# Patient Record
Sex: FEMALE | Race: WHITE | NOT HISPANIC OR LATINO | Employment: OTHER | URBAN - NONMETROPOLITAN AREA
[De-identification: names, ages, dates, MRNs, and addresses within clinical notes are randomized per-mention and may not be internally consistent; named-entity substitution may affect disease eponyms.]

---

## 2017-02-03 ENCOUNTER — TRANSCRIBE ORDERS (OUTPATIENT)
Dept: LAB | Facility: HOSPITAL | Age: 82
End: 2017-02-03

## 2017-02-03 ENCOUNTER — HOSPITAL ENCOUNTER (OUTPATIENT)
Dept: GENERAL RADIOLOGY | Facility: HOSPITAL | Age: 82
Discharge: HOME | End: 2017-02-03
Admitting: NURSE PRACTITIONER

## 2017-02-03 DIAGNOSIS — R91.8 MULTIPLE LUNG NODULES: ICD-10-CM

## 2017-02-03 DIAGNOSIS — R91.8 MULTIPLE LUNG NODULES: Primary | ICD-10-CM

## 2017-02-03 PROCEDURE — 71020 HC CHEST PA AND LATERAL: CPT

## 2017-02-07 ENCOUNTER — HOSPITAL ENCOUNTER (OUTPATIENT)
Dept: GENERAL RADIOLOGY | Facility: HOSPITAL | Age: 82
Discharge: HOME | End: 2017-02-07
Attending: NEUROLOGICAL SURGERY

## 2017-02-07 ENCOUNTER — TRANSCRIBE ORDERS (OUTPATIENT)
Dept: LAB | Facility: HOSPITAL | Age: 82
End: 2017-02-07

## 2017-02-07 ENCOUNTER — HOSPITAL ENCOUNTER (OUTPATIENT)
Dept: GENERAL RADIOLOGY | Facility: HOSPITAL | Age: 82
Discharge: HOME | End: 2017-02-07
Attending: NEUROLOGICAL SURGERY | Admitting: NEUROLOGICAL SURGERY
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
## 2017-02-07 PROCEDURE — 72100 X-RAY EXAM L-S SPINE 2/3 VWS: CPT

## 2017-02-07 PROCEDURE — 72070 X-RAY EXAM THORAC SPINE 2VWS: CPT

## 2017-02-14 ENCOUNTER — APPOINTMENT (OUTPATIENT)
Dept: GENERAL RADIOLOGY | Facility: HOSPITAL | Age: 82
End: 2017-02-14

## 2017-02-14 ENCOUNTER — APPOINTMENT (OUTPATIENT)
Dept: MRI IMAGING | Facility: HOSPITAL | Age: 82
End: 2017-02-14

## 2017-02-14 ENCOUNTER — HOSPITAL ENCOUNTER (INPATIENT)
Facility: HOSPITAL | Age: 82
LOS: 4 days | Discharge: NURSING, REHAB OR LONG-TERM CARE | End: 2017-02-18
Attending: NEUROLOGICAL SURGERY | Admitting: NEUROLOGICAL SURGERY

## 2017-02-14 ENCOUNTER — APPOINTMENT (OUTPATIENT)
Dept: CT IMAGING | Facility: HOSPITAL | Age: 82
End: 2017-02-14

## 2017-02-14 DIAGNOSIS — R53.1 GENERALIZED WEAKNESS: Primary | ICD-10-CM

## 2017-02-14 DIAGNOSIS — R13.10 DYSPHAGIA, UNSPECIFIED TYPE: ICD-10-CM

## 2017-02-14 DIAGNOSIS — Z74.09 IMPAIRED PHYSICAL MOBILITY: ICD-10-CM

## 2017-02-14 DIAGNOSIS — S22.000A THORACIC COMPRESSION FRACTURE, CLOSED, INITIAL ENCOUNTER (HCC): ICD-10-CM

## 2017-02-14 DIAGNOSIS — Z78.9 IMPAIRED MOBILITY AND ADLS: ICD-10-CM

## 2017-02-14 DIAGNOSIS — Z74.09 IMPAIRED MOBILITY AND ADLS: ICD-10-CM

## 2017-02-14 LAB
ALBUMIN SERPL-MCNC: 4.2 G/DL (ref 3.5–5)
ALBUMIN/GLOB SERPL: 1.4 G/DL (ref 1.1–2.5)
ALP SERPL-CCNC: 116 U/L (ref 24–120)
ALT SERPL W P-5'-P-CCNC: 39 U/L (ref 0–54)
ANION GAP SERPL CALCULATED.3IONS-SCNC: 10 MMOL/L (ref 4–13)
APTT PPP: 29.3 SECONDS (ref 24.1–34.8)
AST SERPL-CCNC: 28 U/L (ref 7–45)
BACTERIA UR QL AUTO: ABNORMAL /HPF
BASOPHILS # BLD AUTO: 0.02 10*3/MM3 (ref 0–0.2)
BASOPHILS NFR BLD AUTO: 0.1 % (ref 0–2)
BILIRUB SERPL-MCNC: 1.3 MG/DL (ref 0.1–1)
BILIRUB UR QL STRIP: NEGATIVE
BUN BLD-MCNC: 22 MG/DL (ref 5–21)
BUN/CREAT SERPL: 28.6 (ref 7–25)
CALCIUM SPEC-SCNC: 9.6 MG/DL (ref 8.4–10.4)
CHLORIDE SERPL-SCNC: 96 MMOL/L (ref 98–110)
CLARITY UR: ABNORMAL
CO2 SERPL-SCNC: 23 MMOL/L (ref 24–31)
COLOR UR: ABNORMAL
CREAT BLD-MCNC: 0.77 MG/DL (ref 0.5–1.4)
DEPRECATED RDW RBC AUTO: 49.8 FL (ref 40–54)
EOSINOPHIL # BLD AUTO: 0 10*3/MM3 (ref 0–0.7)
EOSINOPHIL NFR BLD AUTO: 0 % (ref 0–4)
ERYTHROCYTE [DISTWIDTH] IN BLOOD BY AUTOMATED COUNT: 14.4 % (ref 12–15)
GFR SERPL CREATININE-BSD FRML MDRD: 71 ML/MIN/1.73
GLOBULIN UR ELPH-MCNC: 3 GM/DL
GLUCOSE BLD-MCNC: 163 MG/DL (ref 70–100)
GLUCOSE UR STRIP-MCNC: NEGATIVE MG/DL
HCT VFR BLD AUTO: 41.1 % (ref 37–47)
HGB BLD-MCNC: 14 G/DL (ref 12–16)
HGB UR QL STRIP.AUTO: NEGATIVE
HYALINE CASTS UR QL AUTO: ABNORMAL /LPF
IMM GRANULOCYTES # BLD: 0.18 10*3/MM3 (ref 0–0.03)
IMM GRANULOCYTES NFR BLD: 1.2 % (ref 0–5)
INR PPP: 0.97 (ref 0.91–1.09)
KETONES UR QL STRIP: ABNORMAL
LEUKOCYTE ESTERASE UR QL STRIP.AUTO: ABNORMAL
LYMPHOCYTES # BLD AUTO: 0.67 10*3/MM3 (ref 0.72–4.86)
LYMPHOCYTES NFR BLD AUTO: 4.4 % (ref 15–45)
MCH RBC QN AUTO: 32.3 PG (ref 28–32)
MCHC RBC AUTO-ENTMCNC: 34.1 G/DL (ref 33–36)
MCV RBC AUTO: 94.9 FL (ref 82–98)
MONOCYTES # BLD AUTO: 0.62 10*3/MM3 (ref 0.19–1.3)
MONOCYTES NFR BLD AUTO: 4 % (ref 4–12)
NEUTROPHILS # BLD AUTO: 13.84 10*3/MM3 (ref 1.87–8.4)
NEUTROPHILS NFR BLD AUTO: 90.3 % (ref 39–78)
NITRITE UR QL STRIP: NEGATIVE
NT-PROBNP SERPL-MCNC: 320 PG/ML (ref 0–1800)
PH UR STRIP.AUTO: <=5 [PH] (ref 5–8)
PLATELET # BLD AUTO: 267 10*3/MM3 (ref 130–400)
PMV BLD AUTO: 11.3 FL (ref 6–12)
POTASSIUM BLD-SCNC: 4.7 MMOL/L (ref 3.5–5.3)
PROT SERPL-MCNC: 7.2 G/DL (ref 6.3–8.7)
PROT UR QL STRIP: ABNORMAL
PROTHROMBIN TIME: 13.2 SECONDS (ref 11.9–14.6)
RBC # BLD AUTO: 4.33 10*6/MM3 (ref 4.2–5.4)
RBC # UR: ABNORMAL /HPF
REF LAB TEST METHOD: ABNORMAL
SODIUM BLD-SCNC: 129 MMOL/L (ref 135–145)
SP GR UR STRIP: 1.02 (ref 1–1.03)
SQUAMOUS #/AREA URNS HPF: ABNORMAL /HPF
UROBILINOGEN UR QL STRIP: ABNORMAL
WBC NRBC COR # BLD: 15.33 10*3/MM3 (ref 4.8–10.8)
WBC UR QL AUTO: ABNORMAL /HPF

## 2017-02-14 PROCEDURE — 93010 ELECTROCARDIOGRAM REPORT: CPT | Performed by: INTERNAL MEDICINE

## 2017-02-14 PROCEDURE — 85025 COMPLETE CBC W/AUTO DIFF WBC: CPT | Performed by: NEUROLOGICAL SURGERY

## 2017-02-14 PROCEDURE — 70450 CT HEAD/BRAIN W/O DYE: CPT

## 2017-02-14 PROCEDURE — 85610 PROTHROMBIN TIME: CPT | Performed by: NEUROLOGICAL SURGERY

## 2017-02-14 PROCEDURE — 71020 HC CHEST PA AND LATERAL: CPT

## 2017-02-14 PROCEDURE — 93005 ELECTROCARDIOGRAM TRACING: CPT

## 2017-02-14 PROCEDURE — 85730 THROMBOPLASTIN TIME PARTIAL: CPT | Performed by: NEUROLOGICAL SURGERY

## 2017-02-14 PROCEDURE — 73503 X-RAY EXAM HIP UNI 4/> VIEWS: CPT

## 2017-02-14 PROCEDURE — 25010000002 MORPHINE SULFATE (PF) 2 MG/ML SOLUTION: Performed by: INTERNAL MEDICINE

## 2017-02-14 PROCEDURE — 87086 URINE CULTURE/COLONY COUNT: CPT | Performed by: INTERNAL MEDICINE

## 2017-02-14 PROCEDURE — 72131 CT LUMBAR SPINE W/O DYE: CPT

## 2017-02-14 PROCEDURE — 83880 ASSAY OF NATRIURETIC PEPTIDE: CPT | Performed by: INTERNAL MEDICINE

## 2017-02-14 PROCEDURE — 80053 COMPREHEN METABOLIC PANEL: CPT | Performed by: NEUROLOGICAL SURGERY

## 2017-02-14 PROCEDURE — 99285 EMERGENCY DEPT VISIT HI MDM: CPT

## 2017-02-14 PROCEDURE — 72125 CT NECK SPINE W/O DYE: CPT

## 2017-02-14 PROCEDURE — 87088 URINE BACTERIA CULTURE: CPT | Performed by: INTERNAL MEDICINE

## 2017-02-14 PROCEDURE — G0378 HOSPITAL OBSERVATION PER HR: HCPCS

## 2017-02-14 PROCEDURE — 81001 URINALYSIS AUTO W/SCOPE: CPT | Performed by: INTERNAL MEDICINE

## 2017-02-14 PROCEDURE — 87186 SC STD MICRODIL/AGAR DIL: CPT | Performed by: INTERNAL MEDICINE

## 2017-02-14 PROCEDURE — 73502 X-RAY EXAM HIP UNI 2-3 VIEWS: CPT

## 2017-02-14 PROCEDURE — 72128 CT CHEST SPINE W/O DYE: CPT

## 2017-02-14 PROCEDURE — 99220 PR INITIAL OBSERVATION CARE/DAY 70 MINUTES: CPT | Performed by: NEUROLOGICAL SURGERY

## 2017-02-14 PROCEDURE — 87040 BLOOD CULTURE FOR BACTERIA: CPT | Performed by: NEUROLOGICAL SURGERY

## 2017-02-14 PROCEDURE — 73030 X-RAY EXAM OF SHOULDER: CPT

## 2017-02-14 PROCEDURE — 70551 MRI BRAIN STEM W/O DYE: CPT

## 2017-02-14 RX ORDER — ONDANSETRON 2 MG/ML
4 INJECTION INTRAMUSCULAR; INTRAVENOUS EVERY 6 HOURS PRN
Status: DISCONTINUED | OUTPATIENT
Start: 2017-02-14 | End: 2017-02-18 | Stop reason: HOSPADM

## 2017-02-14 RX ORDER — NYSTATIN 100000 [USP'U]/G
POWDER TOPICAL EVERY 12 HOURS SCHEDULED
Status: DISCONTINUED | OUTPATIENT
Start: 2017-02-14 | End: 2017-02-18 | Stop reason: HOSPADM

## 2017-02-14 RX ORDER — PREDNISONE 10 MG/1
10 TABLET ORAL
Status: DISCONTINUED | OUTPATIENT
Start: 2017-02-15 | End: 2017-02-18 | Stop reason: HOSPADM

## 2017-02-14 RX ORDER — HYDROCODONE BITARTRATE AND ACETAMINOPHEN 5; 325 MG/1; MG/1
1 TABLET ORAL EVERY 6 HOURS PRN
Status: DISCONTINUED | OUTPATIENT
Start: 2017-02-14 | End: 2017-02-18 | Stop reason: HOSPADM

## 2017-02-14 RX ORDER — SODIUM CHLORIDE 9 MG/ML
75 INJECTION, SOLUTION INTRAVENOUS CONTINUOUS
Status: DISCONTINUED | OUTPATIENT
Start: 2017-02-14 | End: 2017-02-16

## 2017-02-14 RX ORDER — SENNA AND DOCUSATE SODIUM 50; 8.6 MG/1; MG/1
2 TABLET, FILM COATED ORAL 2 TIMES DAILY
Status: DISCONTINUED | OUTPATIENT
Start: 2017-02-14 | End: 2017-02-18 | Stop reason: HOSPADM

## 2017-02-14 RX ORDER — ACETAMINOPHEN 650 MG/1
650 SUPPOSITORY RECTAL EVERY 4 HOURS PRN
Status: DISCONTINUED | OUTPATIENT
Start: 2017-02-14 | End: 2017-02-18 | Stop reason: HOSPADM

## 2017-02-14 RX ORDER — SODIUM CHLORIDE 0.9 % (FLUSH) 0.9 %
1-10 SYRINGE (ML) INJECTION AS NEEDED
Status: DISCONTINUED | OUTPATIENT
Start: 2017-02-14 | End: 2017-02-18 | Stop reason: HOSPADM

## 2017-02-14 RX ORDER — LOSARTAN POTASSIUM 50 MG/1
50 TABLET ORAL DAILY
Status: DISCONTINUED | OUTPATIENT
Start: 2017-02-14 | End: 2017-02-18 | Stop reason: HOSPADM

## 2017-02-14 RX ORDER — BISACODYL 10 MG
10 SUPPOSITORY, RECTAL RECTAL DAILY PRN
Status: DISCONTINUED | OUTPATIENT
Start: 2017-02-14 | End: 2017-02-18 | Stop reason: HOSPADM

## 2017-02-14 RX ORDER — METOPROLOL SUCCINATE 25 MG/1
12.5 TABLET, EXTENDED RELEASE ORAL NIGHTLY
Status: DISCONTINUED | OUTPATIENT
Start: 2017-02-14 | End: 2017-02-18 | Stop reason: HOSPADM

## 2017-02-14 RX ORDER — OXYCODONE HYDROCHLORIDE AND ACETAMINOPHEN 5; 325 MG/1; MG/1
1 TABLET ORAL EVERY 6 HOURS PRN
Status: DISCONTINUED | OUTPATIENT
Start: 2017-02-14 | End: 2017-02-18 | Stop reason: HOSPADM

## 2017-02-14 RX ORDER — MORPHINE SULFATE 2 MG/ML
1 INJECTION, SOLUTION INTRAMUSCULAR; INTRAVENOUS EVERY 4 HOURS PRN
Status: DISCONTINUED | OUTPATIENT
Start: 2017-02-14 | End: 2017-02-15

## 2017-02-14 RX ADMIN — LOSARTAN POTASSIUM 50 MG: 50 TABLET, FILM COATED ORAL at 17:53

## 2017-02-14 RX ADMIN — MORPHINE SULFATE 1 MG: 2 INJECTION, SOLUTION INTRAMUSCULAR; INTRAVENOUS at 22:35

## 2017-02-14 RX ADMIN — SODIUM CHLORIDE 75 ML/HR: 9 INJECTION, SOLUTION INTRAVENOUS at 17:52

## 2017-02-14 RX ADMIN — MORPHINE SULFATE 1 MG: 2 INJECTION, SOLUTION INTRAMUSCULAR; INTRAVENOUS at 18:46

## 2017-02-14 RX ADMIN — NYSTATIN: 100000 POWDER TOPICAL at 22:40

## 2017-02-14 RX ADMIN — METOPROLOL SUCCINATE 12.5 MG: 25 TABLET, FILM COATED, EXTENDED RELEASE ORAL at 22:37

## 2017-02-14 NOTE — CONSULTS
[facility] Medicine Consult  Consults    Date of Admission: 2/14/2017  Date of Consult: 02/14/17    Primary Care Physician: Pascual Patten MD  Referring Physician:     Chief Complaint/Reason for Consultation: Altered mental status, medical management.    History of Present Illness   Mrs. Neal's 84-year-old  female patient who currently resides at [facility] in [place], [place].  She sees Dr. Pascual Patten for primary care.  I know her well.  Taking care of her in the past.  Also family well.  Her niece, Sarah Yin, is her power of  and also Dr. Yin wife.  She is typically with her on that has gone home tonight.    She was brought in today after falling 5 times over the last week.  Trauma workup in the emergency department ultimately revealed a left intertrochanteric hip fracture.  MRI of the brain without contrast failed to show any acute abnormality.    Currently she complains of pain in her left lower extremity.  She also suffered a couple skin tears.    She denies any recent shortness of breath, chest pain, or extremity edema.  Denies fever sweats or chills.  No cough, productive or otherwise.  No recent increased weight gain or unintentional weight loss.  No problems with dysuria or frequency.  She does have a history of UTI, recurrent.  No changes in bowel habits.  No diarrhea.  She does have a history of previous Clostridium difficile colitis, which was difficult to treat as it was superimposed on her more chronic ulcerative colitis, for which she does not take maintenance therapy.    Review of Systems   Otherwise complete ROS is negative except as mentioned above.    Past Medical History:  has a past medical history of Anxiety; Arthritis; Clostridium difficile infection; Concussion; COPD (chronic obstructive pulmonary disease); Fall; History of transfusion; Hypertension; Inappropriate vasopressin secretion syndrome; Irregular  heart rhythm; TIA (transient ischemic attack); and Ulcerative colitis.    Past Surgical History:  has a past surgical history that includes Cholecystectomy; Replacement total knee bilateral; and Eye surgery.    Family History: Hypertension.    Social History:  reports that she has never smoked. She does not have any smokeless tobacco history on file. She reports that she does not drink alcohol or use illicit drugs.    Allergies:   Allergies   Allergen Reactions   • Albuterol    • Augmentin  [Amoxicillin-Pot Clavulanate]    • Cefdinir    • Cefprozil    • Doxycycline    • Erythromycin    • Moxifloxacin    • Sulfa Antibiotics      Medications: Scheduled Meds:  losartan 50 mg Oral Daily   metoprolol succinate XL 12.5 mg Oral Nightly   nystatin  Topical Q12H   [START ON 2/15/2017] predniSONE 10 mg Oral Daily With Breakfast   sennosides-docusate sodium 2 tablet Oral BID     Continuous Infusions:  sodium chloride 75 mL/hr Last Rate: 75 mL/hr (02/14/17 1752)     PRN Meds:.•  acetaminophen  •  bisacodyl  •  ondansetron  •  sodium chloride    Objective    Physical Exam:   Temp:  [97.3 °F (36.3 °C)-97.8 °F (36.6 °C)] 97.3 °F (36.3 °C)  Heart Rate:  [104-121] 104  Resp:  [16-20] 20  BP: (137-163)/(71-95) 159/95  Physical Exam   Constitutional: She is oriented to person, place, and time. She appears well-developed and well-nourished.   Discussed with her nurse, Greta.   HENT:   Head: Normocephalic and atraumatic.   Eyes: Conjunctivae and EOM are normal. Pupils are equal, round, and reactive to light.   Neck: Neck supple. No JVD present. No thyromegaly present.   Cardiovascular: Normal rate, regular rhythm, normal heart sounds and intact distal pulses.  Exam reveals no gallop and no friction rub.    No murmur heard.  Pulmonary/Chest: Effort normal and breath sounds normal. No respiratory distress. She has no wheezes. She has no rales. She exhibits no tenderness.   Abdominal: Soft. Bowel sounds are normal. She exhibits no  distension. There is no tenderness. There is no rebound and no guarding.   Musculoskeletal: Normal range of motion. She exhibits no edema, tenderness or deformity.   Lymphadenopathy:     She has no cervical adenopathy.   Neurological: She is alert and oriented to person, place, and time. She displays normal reflexes. No cranial nerve deficit. She exhibits normal muscle tone.   Generalized weakness without focality.   Skin: Skin is warm and dry. No rash noted.   Psychiatric: She has a normal mood and affect. Her behavior is normal. Judgment and thought content normal.     Results Reviewed:  I have personally reviewed current lab, radiology, and data and agree with results.  Lab Results (last 24 hours)     Procedure Component Value Units Date/Time    Blood Culture [[id number]] Collected:  02/14/17 1248    Specimen:  Blood from Arm, Right Updated:  02/14/17 1506    CBC & Differential [[id number]] Collected:  02/14/17 1248    Specimen:  Blood Updated:  02/14/17 1506    Narrative:       The following orders were created for panel order CBC & Differential.  Procedure                               Abnormality         Status                     ---------                               -----------         ------                     CBC Auto Differential[[id number]]         Abnormal            Final result                 Please view results for these tests on the individual orders.    CBC Auto Differential [[id number]]  (Abnormal) Collected:  02/14/17 1248    Specimen:  Blood from Arm, Right Updated:  02/14/17 1506     WBC 15.33 (H) 10*3/mm3      RBC 4.33 10*6/mm3      Hemoglobin 14.0 g/dL      Hematocrit 41.1 %      MCV 94.9 fL      MCH 32.3 (H) pg      MCHC 34.1 g/dL      RDW 14.4 %      RDW-SD 49.8 fl      MPV 11.3 fL      Platelets 267 10*3/mm3      Neutrophil % 90.3 (H) %      Lymphocyte % 4.4 (L) %      Monocyte % 4.0 %      Eosinophil % 0.0 %      Basophil % 0.1 %      Immature Grans % 1.2 %      Neutrophils, Absolute 13.84  (H) 10*3/mm3      Lymphocytes, Absolute 0.67 (L) 10*3/mm3      Monocytes, Absolute 0.62 10*3/mm3      Eosinophils, Absolute 0.00 10*3/mm3      Basophils, Absolute 0.02 10*3/mm3      Immature Grans, Absolute 0.18 (H) 10*3/mm3     aPTT [[id number]]  (Normal) Collected:  02/14/17 1248    Specimen:  Blood from Arm, Right Updated:  02/14/17 1518     PTT 29.3 seconds     Protime-INR [[id number]]  (Normal) Collected:  02/14/17 1248    Specimen:  Blood from Arm, Right Updated:  02/14/17 1518     Protime 13.2 Seconds      INR 0.97     Comprehensive Metabolic Panel [[id number]]  (Abnormal) Collected:  02/14/17 1248    Specimen:  Blood from Arm, Right Updated:  02/14/17 1523     Glucose 163 (H) mg/dL      BUN 22 (H) mg/dL      Creatinine 0.77 mg/dL      Sodium 129 (L) mmol/L      Potassium 4.7 mmol/L      Chloride 96 (L) mmol/L      CO2 23.0 (L) mmol/L      Calcium 9.6 mg/dL      Total Protein 7.2 g/dL      Albumin 4.20 g/dL      ALT (SGPT) 39 U/L      AST (SGOT) 28 U/L      Alkaline Phosphatase 116 U/L      Total Bilirubin 1.3 (H) mg/dL      eGFR Non African Amer 71 mL/min/1.73      Globulin 3.0 gm/dL      A/G Ratio 1.4 g/dL      BUN/Creatinine Ratio 28.6 (H)      Anion Gap 10.0 mmol/L     Narrative:       The MDRD GFR formula is only valid for adults with stable renal function between ages 18 and 70.        Imaging Results (last 24 hours)     Procedure Component Value Units Date/Time    CT Cervical Spine Without Contrast [[id number]] Collected:  02/14/17 1523     Updated:  02/14/17 1527    Narrative:       EXAMINATION: CT CERVICAL SPINE WO CONTRAST-      2/14/2017 3:00 PM CST     HISTORY: trauma; R53.1-Weakness  Fall related injury. Neck pain.     In order to have a CT radiation dose as low as reasonably achievable  Automated Exposure Control was utilized for adjustment of the mA and/or  KV according to patient size.     DLP in mGycm= 211.     Routine protocol noncontrast CT imaging of the cervical spine compared  with 06/22/2015.      Diffuse osteopenia.  Curvature and alignment is appropriate.     There is multilevel disc space narrowing and endplate spurring most  prominent at C3-4, C5-6, and C6-7.     Facet joints spurring is noted. Facet joints align appropriately.  Prevertebral soft tissues are appropriate.     No significant scoliosis.     Bilateral carotid artery calcification is noted.     Summary:  1. Extensive degenerative disc and endplate change with no evidence of  fracture.       This report was finalized on 02/14/2017 15:24 by Dr. Jonathno Gómez MD.    CT Lumbar Spine Without Contrast [[id number]] Collected:  02/14/17 1530     Updated:  02/14/17 1543    Narrative:       EXAMINATION: CT LUMBAR SPINE WO CONTRAST-      2/14/2017 3:00 PM CST     HISTORY: trauma; R53.1-Weakness  Fall. Back pain.     In order to have a CT radiation dose as low as reasonably achievable  Automated Exposure Control was utilized for adjustment of the mA and/or  KV according to patient size.     DLP in mGycm= 844.     Routine protocol noncontrast axial, sagittal, and coronal lumbar spine  CT imaging.     Diffuse osteopenia.     13 degrees left convex lumbar scoliosis.  Associated asymmetric disc space narrowing and endplate spurring.     Sagittal imaging shows 4-5 mm anterolisthesis at L4-5.  Transitional lumbosacral segment with sacralization of L5.     Multilevel disc space narrowing, endplate spurring, and degenerative  vacuum disc change.     No compression fracture.     Sclerosis and cortical irregularity compatible with a right sacral ala  insufficiency fracture     Summary:  1. Extensive degenerative disc and endplate change with no acute lumbar  spine fracture.  2. Subacute or chronic right sacral ala insufficiency fracture present.     This report was finalized on 02/14/2017 15:41 by Dr. Jonathon Gómez MD.    CT Head Without Contrast [[id number]] Collected:  02/14/17 1538     Updated:  02/14/17 1626    Narrative:       HISTORY: Confusion, fell, DLP  593.     CT SCAN OF THE HEAD WITHOUT CONTRAST:  Comparison study dated 12/03/2016.     Diffuse atrophy and chronic small vessel ischemic changes felt present.  Some left frontal cortical inner table thickening is stable and suggests  a possible minimal chronic meningioma. There is no midline shift or  extra axial fluid collection noted.     Vascular calcifications noted diffusely. There is no evidence of edema  or hemorrhage. Basal ganglia calcifications are slightly asymmetrical.     Some lucencies in the left parietal bone appears stable and do not  appear to represent acute fractures.     Reformatted images are reviewed. High left parietal scalp swelling near  the vertex.     IMPRESSIONS:  1. Atrophy and chronic changes. No evidence of acute hemorrhage or  hematoma.  2. Some high left parietal scalp hematoma near the vertex. No associated  acute fracture.  3. Vascular calcifications present.  This report was finalized on 02/14/2017 16:24 by Dr. Nickolas Villanueva MD.    XR Chest PA & Lateral [[id number]] Collected:  02/14/17 1715     Updated:  02/14/17 1718    Narrative:       EXAMINATION: XR CHEST PA AND LATERAL- 2/14/2017 5:15 PM CST     HISTORY: Shortness of breath.     REPORT: Comparison is made with the study from 02/03/2017.     Coarse interstitial infiltrates have increased, most likely interstitial  edema, there is mild cardiomegaly. There is moderate thoracic aortic  ectasia. No lung consolidation is identified. No pneumothorax or pleural  effusion seen. The osseous structures are within normal limits.       Impression:       Interstitial opacities likely related to edema and volume  overload. Interstitial pneumonitis is also in the differential. There is  no consolidating pneumonia. Underlying COPD is evident.  This report was finalized on 02/14/2017 17:16 by Dr. Farrukh Cantu MD.    CT Thoracic Spine Without Contrast [[id number]] Collected:  02/14/17 1537     Updated:  02/14/17 1718    Narrative:       EXAM:    CT THORACIC SPINE WO CONTRAST-       DATE:  02/14/2017      HISTORY:  Female, age  84 years;trauma; R53.1-Weakness     COMPARISON:  None.     FINDINGS:  There is a subtle grade 1 anterolisthesis of C7 on T1 and C4 on C5.  There is no evidence of acute compression deformity. The demineralized  skeleton does limit bony details for nondisplaced acute fracture. The  prevertebral and posterior paraspinal soft tissues are unremarkable.  There are multilevel degenerative changes in the thoracic spine. No  suspicious lytic or blastic lesion.       Impression:       1.  No acute osseous pathology.  2.  Chronic findings as described above.     This report was finalized on 02/14/2017 17:16 by Dr. Perlita Fountain MD.    XR Shoulder 2+ View Left [[id number]] Collected:  02/14/17 1718     Updated:  02/14/17 1721    Narrative:       EXAMINATION: XR SHOULDER 2+ VW LEFT- 2/14/2017 5:18 PM CST     HISTORY: Trauma.     REPORT: 2 views of the left shoulder were obtained. There are no  comparison studies.     Alignment is normal, no fractures identified. The joint spaces are  preserved. There is mild spurring at the undersurface of the  acromioclavicular joint and inferior glenoid process.       Impression:       No acute osseous abnormality. Mild degenerative changes are  present.  This report was finalized on 02/14/2017 17:19 by Dr. Farrukh Cantu MD.    XR Hip With or Without Pelvis 2 - 3 View Left [[id number]] Collected:  02/14/17 1719     Updated:  02/14/17 1723    Narrative:       EXAMINATION: XR HIP W OR WO PELVIS 2-3 VIEW LEFT- 2/14/2017 5:19 PM CST     HISTORY: Left hip pain.     REPORT: An AP view the pelvis and to separate views of the left hip were  obtained. There are no comparison studies.     There is a complete closed mildly displaced acute intertrochanteric left  fracture, this is extra-articular. The hip joint spaces are preserved.  The right hip is normal. The other osseous structures are unremarkable  except for  moderate degenerative changes of the lower lumbar spine.       Impression:       Acute complete closed and mildly displaced left  intertrochanteric hip fracture.  This report was finalized on 02/14/2017 17:21 by Dr. Farrukh Cantu MD.    MRI Brain Without Contrast [[id number]] Collected:  02/14/17 1749     Updated:  02/14/17 1754    Narrative:       EXAMINATION: MRI BRAIN WO CONTRAST- 2/14/2017 5:49 PM CST     HISTORY:   Since speech disturbance.     REPORT: Multiplanar multisequence MR imaging of the brain was performed  without contrast. Comparison is made with previous MRI from 11/04/2014  and head CT from 02/14/2017.     The diffusion-weighted images show no diffusion restriction, there is  motion artifact which degrades the study. The sagittal T1-weighted  images show a grossly normal appearance of the midline structures  including the pituitary gland, brainstem and corpus callosum. There is  mild to moderate diffuse cortical atrophy. Gradient echo images show no  evidence of intracranial hemorrhage, given the limitations of motion  artifact. The ventricles and basal cisterns are within normal limits.  There is a large amount of confluent FLAIR and T2 hyperintense signal  within the subcortical and periventricular right matter tracts  compatible with advanced chronic small vessel white matter ischemic  disease. This appears unchanged. MR signal is seen within the deep white  matter tracts within the joseluis and brainstem. There is no mass effect.       Impression:       Limited study due to patient motion shows no acute  intracranial abnormality. There are severe chronic small vessel white  matter ischemic changes as well as moderate diffuse atrophy.  This report was finalized on 02/14/2017 17:51 by Dr. Farrukh Cantu MD.        Assessment & Plan  1.  Recurrent falls.  2.  Altered mental status.  3.  Acute, mildly displaced left intertrochanteric hip fracture.  4.  Subacute or chronic right sacral ala  insufficiency fracture.  5.  Hypertension.   6.  Hyponatremia, chronic, SIADH.   7.  History of ulcerative colitis.  8.  History of Clostridium difficile colitis.  9.  Hyperlipidemia.  10.  Interstitial opacities, pulmonary edema versus interstitial pneumonitis, asymptomatic..     Continue home medications.  She will be nothing by mouth after midnight.  Orthopedics to see.      She is intermediate risk for intermediate risk procedure with regards to hip fracture.    Pain control.    She needs a urinalysis with culture and sensitivity.  She has a history of recurrent UTI.  This could be causing some of her altered mental status as this has been a problem for her in the past.    Add a BNP to her labs.  She has an abnormal chest x-ray, but is asymptomatic at this point in time.  No recent echocardiogram in the system.  No history of congestive heart failure.  She does have a slight leukocytosis but no other signs or symptoms of pneumonia.    Fluid restriction for her history of chronic hyponatremia and SIADH.    She will need postoperative DVT prophylaxis.    She will need postoperative physical and occupational therapy consultation.    Case management consultation for possible placement after her operative repair.    I discussed the patients findings and my recommendations with the patient and her niece.     Yusef Hay DO  02/14/17  5:56 PM

## 2017-02-14 NOTE — H&P
Patient Care Team:  Pascual Patten MD as PCP - General (Internal Medicine)  Alli Hayes MD as Consulting Physician (Pulmonary Disease)    Chief complaint: fall    Subjective     HPI: HPI    This patient is a 84-year-old female who has fallen 5 times over the past week.  In addition she is also developing speech abnormality.  She was brought to the ER by her niece, neurosurgery has been constant regarding her altered mental status.  She is currently resting comfortably, she is unable to provide any history.    Review of Systems     A 12 point review of systems is unable to be obtained secondary to patient's speech difficulties    Past Medical History   Diagnosis Date   • Anxiety    • Arthritis    • Clostridium difficile infection    • Concussion    • Fall    • Hypertension    • Inappropriate vasopressin secretion syndrome    • Irregular heart rhythm    • TIA (transient ischemic attack)    • Ulcerative colitis      Past Surgical History   Procedure Laterality Date   • Cholecystectomy     • Replacement total knee bilateral       History reviewed. No pertinent family history.  Social History   Substance Use Topics   • Smoking status: Never Smoker   • Smokeless tobacco: None   • Alcohol use No       (Not in a hospital admission)  Allergies:  Albuterol; Augmentin  [amoxicillin-pot clavulanate]; Cefdinir; Cefprozil; Doxycycline; Erythromycin; Moxifloxacin; and Sulfa antibiotics      Objective    Neurologic Exam  Vital Signs  Temp:  [97.8 °F (36.6 °C)] 97.8 °F (36.6 °C)  Heart Rate:  [117-121] 118  Resp:  [16] 16  BP: (137-163)/(71-86) 137/71    Physical Exam    No acute distress  Awake alert  HEENT right forehead ecchymosis  Neck supple  Abdomen soft nontender  Extremities left ankle abrasion, no clubbing edema cyanosis  Neurologic exam  Cranial nerves II through XII intact  Patient follows commands briskly  Patient has elements of expressive aphasia  2+ reflexes  No long tract signs  Patient moves all  extremities well  She has significant pain and left hip with extension of leg  Exam is limited by patient's mental status    Results Review:   I reviewed the patient's new clinical results.  I reviewed the patient's new imaging results and agree with the interpretation.  I reviewed the patient's other test results and agree with the interpretation      Lab Results (last 24 hours)     ** No results found for the last 24 hours. **            Patient Active Problem List   Diagnosis   • Generalized weakness   • SIADH (syndrome of inappropriate ADH production)   • HTN (hypertension)   • Osteoarthritis   • Ulcerative colitis   • Pulmonary nodules   • Iron deficiency anemia   • Bacterial UTI   • Altered mental status       Assessment/Plan     84-year-old female status post multiple falls with altered mental status.  We will obtain a CT scan of the head and plain films of the left hip and follow-up after completion of study.  Discussed with ruben, patient is DNR/DNI.    I discussed the patients findings and my recommendations with patient    Patricio Neal MD  02/14/17  3:00 PM

## 2017-02-14 NOTE — PLAN OF CARE
Problem: Patient Care Overview (Adult)  Goal: Plan of Care Review  Outcome: Ongoing (interventions implemented as appropriate)    02/14/17 8886   Coping/Psychosocial Response Interventions   Plan Of Care Reviewed With patient   Patient Care Overview   Progress no change   Outcome Evaluation   Outcome Summary/Follow up Plan Patient admitted to room 473, disoriented to time with reorientation, Neuro Q4H, will continue to monitor.          Problem: Confusion, Acute (Adult)  Goal: Identify Related Risk Factors and Signs and Symptoms  Outcome: Outcome(s) achieved Date Met:  02/14/17  Goal: Cognitive/Functional Impairments Minimized  Outcome: Ongoing (interventions implemented as appropriate)  Goal: Safety  Outcome: Ongoing (interventions implemented as appropriate)

## 2017-02-14 NOTE — ED NOTES
Patient's niece at bedside. Patient's niece states patient has fallen five times in the past week. Patient's family states patient has been walking with a walker, but is very unstable. Today patient was found in floor at assisted living facility in her room. Fall was unwitnessed. Patient is verbal but with inappropriate speech. Patient is unable to recall name of family member. Patient's niece states patient was having normal conversation with her this morning.      Samantha Navarro RN  02/14/17 1659

## 2017-02-15 ENCOUNTER — APPOINTMENT (OUTPATIENT)
Dept: GENERAL RADIOLOGY | Facility: HOSPITAL | Age: 82
End: 2017-02-15

## 2017-02-15 ENCOUNTER — ANESTHESIA EVENT (OUTPATIENT)
Dept: PERIOP | Facility: HOSPITAL | Age: 82
End: 2017-02-15

## 2017-02-15 ENCOUNTER — ANESTHESIA (OUTPATIENT)
Dept: PERIOP | Facility: HOSPITAL | Age: 82
End: 2017-02-15

## 2017-02-15 LAB
ANION GAP SERPL CALCULATED.3IONS-SCNC: 7 MMOL/L (ref 4–13)
BUN BLD-MCNC: 21 MG/DL (ref 5–21)
BUN/CREAT SERPL: 35.6 (ref 7–25)
CALCIUM SPEC-SCNC: 8.9 MG/DL (ref 8.4–10.4)
CHLORIDE SERPL-SCNC: 100 MMOL/L (ref 98–110)
CO2 SERPL-SCNC: 23 MMOL/L (ref 24–31)
CREAT BLD-MCNC: 0.59 MG/DL (ref 0.5–1.4)
DEPRECATED RDW RBC AUTO: 48.7 FL (ref 40–54)
ERYTHROCYTE [DISTWIDTH] IN BLOOD BY AUTOMATED COUNT: 14.4 % (ref 12–15)
GFR SERPL CREATININE-BSD FRML MDRD: 97 ML/MIN/1.73
GLUCOSE BLD-MCNC: 105 MG/DL (ref 70–100)
HCT VFR BLD AUTO: 36.7 % (ref 37–47)
HGB BLD-MCNC: 12.5 G/DL (ref 12–16)
MCH RBC QN AUTO: 31.9 PG (ref 28–32)
MCHC RBC AUTO-ENTMCNC: 34.1 G/DL (ref 33–36)
MCV RBC AUTO: 93.6 FL (ref 82–98)
PLATELET # BLD AUTO: 206 10*3/MM3 (ref 130–400)
PMV BLD AUTO: 10.8 FL (ref 6–12)
POTASSIUM BLD-SCNC: 4.6 MMOL/L (ref 3.5–5.3)
RBC # BLD AUTO: 3.92 10*6/MM3 (ref 4.2–5.4)
SODIUM BLD-SCNC: 130 MMOL/L (ref 135–145)
WBC NRBC COR # BLD: 15.76 10*3/MM3 (ref 4.8–10.8)

## 2017-02-15 PROCEDURE — 25010000002 FENTANYL CITRATE (PF) 250 MCG/5ML SOLUTION: Performed by: NURSE ANESTHETIST, CERTIFIED REGISTERED

## 2017-02-15 PROCEDURE — 25010000002 SUCCINYLCHOLINE PER 20 MG: Performed by: NURSE ANESTHETIST, CERTIFIED REGISTERED

## 2017-02-15 PROCEDURE — 25010000002 ROPIVACAINE PER 1 MG: Performed by: ORTHOPAEDIC SURGERY

## 2017-02-15 PROCEDURE — 76000 FLUOROSCOPY <1 HR PHYS/QHP: CPT

## 2017-02-15 PROCEDURE — 80048 BASIC METABOLIC PNL TOTAL CA: CPT | Performed by: NURSE PRACTITIONER

## 2017-02-15 PROCEDURE — 73501 X-RAY EXAM HIP UNI 1 VIEW: CPT

## 2017-02-15 PROCEDURE — C1713 ANCHOR/SCREW BN/BN,TIS/BN: HCPCS | Performed by: ORTHOPAEDIC SURGERY

## 2017-02-15 PROCEDURE — 85027 COMPLETE CBC AUTOMATED: CPT | Performed by: NURSE PRACTITIONER

## 2017-02-15 PROCEDURE — 99224 PR SBSQ OBSERVATION CARE/DAY 15 MINUTES: CPT | Performed by: NEUROLOGICAL SURGERY

## 2017-02-15 PROCEDURE — 25010000002 PROPOFOL 10 MG/ML EMULSION: Performed by: NURSE ANESTHETIST, CERTIFIED REGISTERED

## 2017-02-15 PROCEDURE — 25010000002 MORPHINE SULFATE (PF) 2 MG/ML SOLUTION: Performed by: NURSE PRACTITIONER

## 2017-02-15 PROCEDURE — 0QS706Z REPOSITION LEFT UPPER FEMUR WITH INTRAMEDULLARY INTERNAL FIXATION DEVICE, OPEN APPROACH: ICD-10-PCS | Performed by: ORTHOPAEDIC SURGERY

## 2017-02-15 PROCEDURE — 94799 UNLISTED PULMONARY SVC/PX: CPT

## 2017-02-15 PROCEDURE — 25010000002 MORPHINE SULFATE (PF) 2 MG/ML SOLUTION: Performed by: INTERNAL MEDICINE

## 2017-02-15 PROCEDURE — 25010000002 METHYLPREDNISOLONE PER 125 MG: Performed by: ANESTHESIOLOGY

## 2017-02-15 DEVICE — SCRW LK STRDRV TI 5X38M STRL: Type: IMPLANTABLE DEVICE | Status: FUNCTIONAL

## 2017-02-15 DEVICE — BLD FEM FIX HELI TFN ADV 95MM STRL: Type: IMPLANTABLE DEVICE | Status: FUNCTIONAL

## 2017-02-15 DEVICE — NAIL FEM TFN ADV PROX 130D SHT 11X170MM STRL: Type: IMPLANTABLE DEVICE | Status: FUNCTIONAL

## 2017-02-15 RX ORDER — HYDROCODONE BITARTRATE AND ACETAMINOPHEN 7.5; 325 MG/1; MG/1
2 TABLET ORAL EVERY 4 HOURS PRN
Status: DISCONTINUED | OUTPATIENT
Start: 2017-02-15 | End: 2017-02-18 | Stop reason: HOSPADM

## 2017-02-15 RX ORDER — ACETAMINOPHEN 10 MG/ML
1000 INJECTION, SOLUTION INTRAVENOUS ONCE
Status: COMPLETED | OUTPATIENT
Start: 2017-02-15 | End: 2017-02-15

## 2017-02-15 RX ORDER — CLINDAMYCIN PHOSPHATE 900 MG/50ML
900 INJECTION INTRAVENOUS EVERY 8 HOURS
Status: COMPLETED | OUTPATIENT
Start: 2017-02-15 | End: 2017-02-16

## 2017-02-15 RX ORDER — MEPERIDINE HYDROCHLORIDE 25 MG/ML
12.5 INJECTION INTRAMUSCULAR; INTRAVENOUS; SUBCUTANEOUS
Status: DISCONTINUED | OUTPATIENT
Start: 2017-02-15 | End: 2017-02-15 | Stop reason: HOSPADM

## 2017-02-15 RX ORDER — DIPHENHYDRAMINE HYDROCHLORIDE 50 MG/ML
12.5 INJECTION INTRAMUSCULAR; INTRAVENOUS
Status: DISCONTINUED | OUTPATIENT
Start: 2017-02-15 | End: 2017-02-15 | Stop reason: HOSPADM

## 2017-02-15 RX ORDER — ACETAMINOPHEN 325 MG/1
650 TABLET ORAL EVERY 4 HOURS PRN
Status: DISCONTINUED | OUTPATIENT
Start: 2017-02-15 | End: 2017-02-18 | Stop reason: HOSPADM

## 2017-02-15 RX ORDER — SODIUM CHLORIDE 0.9 % (FLUSH) 0.9 %
1-10 SYRINGE (ML) INJECTION AS NEEDED
Status: DISCONTINUED | OUTPATIENT
Start: 2017-02-15 | End: 2017-02-15 | Stop reason: HOSPADM

## 2017-02-15 RX ORDER — LIDOCAINE HYDROCHLORIDE 20 MG/ML
INJECTION, SOLUTION INFILTRATION; PERINEURAL AS NEEDED
Status: DISCONTINUED | OUTPATIENT
Start: 2017-02-15 | End: 2017-02-15 | Stop reason: SURG

## 2017-02-15 RX ORDER — PROPOFOL 10 MG/ML
VIAL (ML) INTRAVENOUS AS NEEDED
Status: DISCONTINUED | OUTPATIENT
Start: 2017-02-15 | End: 2017-02-15 | Stop reason: SURG

## 2017-02-15 RX ORDER — SODIUM CHLORIDE 9 MG/ML
100 INJECTION, SOLUTION INTRAVENOUS CONTINUOUS
Status: DISCONTINUED | OUTPATIENT
Start: 2017-02-15 | End: 2017-02-16

## 2017-02-15 RX ORDER — HYDROMORPHONE HCL 110MG/55ML
2 PATIENT CONTROLLED ANALGESIA SYRINGE INTRAVENOUS EVERY 4 HOURS PRN
Status: DISCONTINUED | OUTPATIENT
Start: 2017-02-15 | End: 2017-02-18 | Stop reason: HOSPADM

## 2017-02-15 RX ORDER — METHYLPREDNISOLONE SODIUM SUCCINATE 125 MG/2ML
125 INJECTION, POWDER, LYOPHILIZED, FOR SOLUTION INTRAMUSCULAR; INTRAVENOUS ONCE
Status: COMPLETED | OUTPATIENT
Start: 2017-02-15 | End: 2017-02-15

## 2017-02-15 RX ORDER — SODIUM CHLORIDE, SODIUM LACTATE, POTASSIUM CHLORIDE, CALCIUM CHLORIDE 600; 310; 30; 20 MG/100ML; MG/100ML; MG/100ML; MG/100ML
9 INJECTION, SOLUTION INTRAVENOUS CONTINUOUS
Status: DISCONTINUED | OUTPATIENT
Start: 2017-02-15 | End: 2017-02-16

## 2017-02-15 RX ORDER — IPRATROPIUM BROMIDE AND ALBUTEROL SULFATE 2.5; .5 MG/3ML; MG/3ML
3 SOLUTION RESPIRATORY (INHALATION) ONCE AS NEEDED
Status: DISCONTINUED | OUTPATIENT
Start: 2017-02-15 | End: 2017-02-15 | Stop reason: HOSPADM

## 2017-02-15 RX ORDER — CLINDAMYCIN PHOSPHATE 900 MG/50ML
900 INJECTION INTRAVENOUS
Status: DISCONTINUED | OUTPATIENT
Start: 2017-02-15 | End: 2017-02-15 | Stop reason: HOSPADM

## 2017-02-15 RX ORDER — PHENYLEPHRINE HCL IN 0.9% NACL 0.8MG/10ML
SYRINGE (ML) INTRAVENOUS AS NEEDED
Status: DISCONTINUED | OUTPATIENT
Start: 2017-02-15 | End: 2017-02-15 | Stop reason: SURG

## 2017-02-15 RX ORDER — ROPIVACAINE HYDROCHLORIDE 5 MG/ML
INJECTION, SOLUTION EPIDURAL; INFILTRATION; PERINEURAL AS NEEDED
Status: DISCONTINUED | OUTPATIENT
Start: 2017-02-15 | End: 2017-02-15 | Stop reason: HOSPADM

## 2017-02-15 RX ORDER — FENTANYL CITRATE 50 UG/ML
INJECTION, SOLUTION INTRAMUSCULAR; INTRAVENOUS AS NEEDED
Status: DISCONTINUED | OUTPATIENT
Start: 2017-02-15 | End: 2017-02-15 | Stop reason: SURG

## 2017-02-15 RX ORDER — FAMOTIDINE 10 MG/ML
20 INJECTION, SOLUTION INTRAVENOUS
Status: DISCONTINUED | OUTPATIENT
Start: 2017-02-15 | End: 2017-02-15 | Stop reason: HOSPADM

## 2017-02-15 RX ORDER — METOPROLOL TARTRATE 5 MG/5ML
2.5 INJECTION INTRAVENOUS
Status: DISCONTINUED | OUTPATIENT
Start: 2017-02-15 | End: 2017-02-15 | Stop reason: HOSPADM

## 2017-02-15 RX ORDER — DEXTROSE MONOHYDRATE 25 G/50ML
12.5 INJECTION, SOLUTION INTRAVENOUS AS NEEDED
Status: DISCONTINUED | OUTPATIENT
Start: 2017-02-15 | End: 2017-02-15 | Stop reason: HOSPADM

## 2017-02-15 RX ORDER — NALOXONE HCL 0.4 MG/ML
0.4 VIAL (ML) INJECTION AS NEEDED
Status: DISCONTINUED | OUTPATIENT
Start: 2017-02-15 | End: 2017-02-15 | Stop reason: HOSPADM

## 2017-02-15 RX ORDER — MORPHINE SULFATE 2 MG/ML
1 INJECTION, SOLUTION INTRAMUSCULAR; INTRAVENOUS
Status: DISCONTINUED | OUTPATIENT
Start: 2017-02-15 | End: 2017-02-18 | Stop reason: HOSPADM

## 2017-02-15 RX ORDER — FENTANYL CITRATE 50 UG/ML
25 INJECTION, SOLUTION INTRAMUSCULAR; INTRAVENOUS
Status: DISCONTINUED | OUTPATIENT
Start: 2017-02-15 | End: 2017-02-15 | Stop reason: HOSPADM

## 2017-02-15 RX ORDER — MORPHINE SULFATE 2 MG/ML
2 INJECTION, SOLUTION INTRAMUSCULAR; INTRAVENOUS
Status: DISCONTINUED | OUTPATIENT
Start: 2017-02-15 | End: 2017-02-15 | Stop reason: HOSPADM

## 2017-02-15 RX ORDER — SUCCINYLCHOLINE CHLORIDE 20 MG/ML
INJECTION INTRAMUSCULAR; INTRAVENOUS AS NEEDED
Status: DISCONTINUED | OUTPATIENT
Start: 2017-02-15 | End: 2017-02-15 | Stop reason: SURG

## 2017-02-15 RX ORDER — MIDAZOLAM HYDROCHLORIDE 1 MG/ML
2 INJECTION INTRAMUSCULAR; INTRAVENOUS
Status: DISCONTINUED | OUTPATIENT
Start: 2017-02-15 | End: 2017-02-15 | Stop reason: HOSPADM

## 2017-02-15 RX ORDER — HYDRALAZINE HYDROCHLORIDE 20 MG/ML
5 INJECTION INTRAMUSCULAR; INTRAVENOUS
Status: DISCONTINUED | OUTPATIENT
Start: 2017-02-15 | End: 2017-02-15 | Stop reason: HOSPADM

## 2017-02-15 RX ORDER — NALOXONE HCL 0.4 MG/ML
0.4 VIAL (ML) INJECTION
Status: DISCONTINUED | OUTPATIENT
Start: 2017-02-15 | End: 2017-02-18 | Stop reason: HOSPADM

## 2017-02-15 RX ORDER — MAGNESIUM HYDROXIDE 1200 MG/15ML
LIQUID ORAL AS NEEDED
Status: DISCONTINUED | OUTPATIENT
Start: 2017-02-15 | End: 2017-02-15 | Stop reason: HOSPADM

## 2017-02-15 RX ORDER — ONDANSETRON 2 MG/ML
4 INJECTION INTRAMUSCULAR; INTRAVENOUS ONCE AS NEEDED
Status: DISCONTINUED | OUTPATIENT
Start: 2017-02-15 | End: 2017-02-15 | Stop reason: HOSPADM

## 2017-02-15 RX ORDER — MIDAZOLAM HYDROCHLORIDE 1 MG/ML
1 INJECTION INTRAMUSCULAR; INTRAVENOUS
Status: DISCONTINUED | OUTPATIENT
Start: 2017-02-15 | End: 2017-02-15 | Stop reason: HOSPADM

## 2017-02-15 RX ORDER — LABETALOL HYDROCHLORIDE 5 MG/ML
5 INJECTION, SOLUTION INTRAVENOUS
Status: DISCONTINUED | OUTPATIENT
Start: 2017-02-15 | End: 2017-02-15 | Stop reason: HOSPADM

## 2017-02-15 RX ADMIN — METOPROLOL SUCCINATE 12.5 MG: 25 TABLET, FILM COATED, EXTENDED RELEASE ORAL at 22:49

## 2017-02-15 RX ADMIN — FENTANYL CITRATE 50 MCG: 50 INJECTION INTRAMUSCULAR; INTRAVENOUS at 15:51

## 2017-02-15 RX ADMIN — ACETAMINOPHEN 1000 MG: 10 INJECTION, SOLUTION INTRAVENOUS at 15:01

## 2017-02-15 RX ADMIN — Medication 80 MCG: at 16:07

## 2017-02-15 RX ADMIN — NYSTATIN: 100000 POWDER TOPICAL at 22:49

## 2017-02-15 RX ADMIN — SODIUM CHLORIDE 100 ML/HR: 9 INJECTION, SOLUTION INTRAVENOUS at 18:07

## 2017-02-15 RX ADMIN — FENTANYL CITRATE 12.5 MCG: 50 INJECTION INTRAMUSCULAR; INTRAVENOUS at 16:45

## 2017-02-15 RX ADMIN — SUCCINYLCHOLINE CHLORIDE 120 MG: 20 INJECTION, SOLUTION INTRAMUSCULAR; INTRAVENOUS at 15:33

## 2017-02-15 RX ADMIN — FENTANYL CITRATE 25 MCG: 50 INJECTION INTRAMUSCULAR; INTRAVENOUS at 16:12

## 2017-02-15 RX ADMIN — FENTANYL CITRATE 50 MCG: 50 INJECTION INTRAMUSCULAR; INTRAVENOUS at 15:32

## 2017-02-15 RX ADMIN — RIVAROXABAN 10 MG: 10 TABLET, FILM COATED ORAL at 18:36

## 2017-02-15 RX ADMIN — ERTAPENEM SODIUM 1 G: 1 INJECTION, POWDER, LYOPHILIZED, FOR SOLUTION INTRAMUSCULAR; INTRAVENOUS at 09:00

## 2017-02-15 RX ADMIN — SODIUM CHLORIDE 75 ML/HR: 9 INJECTION, SOLUTION INTRAVENOUS at 05:22

## 2017-02-15 RX ADMIN — Medication 80 MCG: at 15:36

## 2017-02-15 RX ADMIN — METHYLPREDNISOLONE SODIUM SUCCINATE 125 MG: 125 INJECTION, POWDER, FOR SOLUTION INTRAMUSCULAR; INTRAVENOUS at 15:00

## 2017-02-15 RX ADMIN — LIDOCAINE HYDROCHLORIDE 100 MG: 20 INJECTION, SOLUTION INFILTRATION; PERINEURAL at 15:33

## 2017-02-15 RX ADMIN — MORPHINE SULFATE 1 MG: 2 INJECTION, SOLUTION INTRAMUSCULAR; INTRAVENOUS at 02:23

## 2017-02-15 RX ADMIN — CLINDAMYCIN PHOSPHATE 900 MG: 18 INJECTION, SOLUTION INTRAVENOUS at 21:51

## 2017-02-15 RX ADMIN — MORPHINE SULFATE 1 MG: 2 INJECTION, SOLUTION INTRAMUSCULAR; INTRAVENOUS at 05:58

## 2017-02-15 RX ADMIN — FAMOTIDINE 20 MG: 10 INJECTION, SOLUTION INTRAVENOUS at 15:01

## 2017-02-15 RX ADMIN — FENTANYL CITRATE 12.5 MCG: 50 INJECTION INTRAMUSCULAR; INTRAVENOUS at 16:22

## 2017-02-15 RX ADMIN — SODIUM CHLORIDE, POTASSIUM CHLORIDE, SODIUM LACTATE AND CALCIUM CHLORIDE 9 ML/HR: 600; 310; 30; 20 INJECTION, SOLUTION INTRAVENOUS at 14:15

## 2017-02-15 RX ADMIN — MORPHINE SULFATE 1 MG: 2 INJECTION, SOLUTION INTRAMUSCULAR; INTRAVENOUS at 13:04

## 2017-02-15 RX ADMIN — PROPOFOL 80 MG: 10 INJECTION, EMULSION INTRAVENOUS at 15:32

## 2017-02-15 RX ADMIN — MORPHINE SULFATE 1 MG: 2 INJECTION, SOLUTION INTRAMUSCULAR; INTRAVENOUS at 09:00

## 2017-02-15 RX ADMIN — CLINDAMYCIN PHOSPHATE 900 MG: 18 INJECTION, SOLUTION INTRAVENOUS at 15:18

## 2017-02-15 NOTE — PROGRESS NOTES
[facility] Medicine Services  INPATIENT PROGRESS NOTE    Length of Stay: 1  Date of Admission: 2/14/2017  Primary Care Physician: Pascual Patten MD    Subjective   Chief Complaint: severe left hip pain    HPI   Patient awake, complaining of left hip pain, requesting more pain medication.  She states she is somewhat short of breath, no overt respiratory distress noted.  She has no other complaints, chest pain, abdominal pain, changes in bowel or bladder habits.  Niece is at bedside who states patient has chronic loose stools but no diarrhea but she would be watching for changes due to history of C. difficile toxin.  Patient scheduled for hip surgery this afternoon.  I have discussed with RN, Skyler, increased frequency of pain medication.    Review of Systems   Respiratory: Positive for shortness of breath.    Musculoskeletal:        Hip pain      All pertinent negatives and positives are as above. All other systems have been reviewed and are negative unless otherwise stated.     Objective    Temp:  [97.3 °F (36.3 °C)-98.9 °F (37.2 °C)] 98.9 °F (37.2 °C)  Heart Rate:  [] 86  Resp:  [16-20] 18  BP: (126-163)/(58-95) 149/78    Physical Exam   Constitutional: She is oriented to person, place, and time. She appears well-developed and well-nourished. No distress.   HENT:   Head: Normocephalic and atraumatic.   Eyes: Conjunctivae and EOM are normal. Pupils are equal, round, and reactive to light. No scleral icterus.   Neck: Normal range of motion. Neck supple. No JVD present. No tracheal deviation present.   Cardiovascular: Normal rate, regular rhythm, normal heart sounds and intact distal pulses.  Exam reveals no gallop.    No murmur heard.  Pulmonary/Chest: Effort normal and breath sounds normal. No respiratory distress. She has no wheezes. She has no rales.   Abdominal: Soft. Bowel sounds are normal. She exhibits no distension. There is no tenderness. There is no guarding.    Musculoskeletal: She exhibits no edema.   Neurological: She is alert and oriented to person, place, and time.   No obvious deficits noted.   Skin: Skin is warm and dry. No rash noted. She is not diaphoretic. No erythema. No pallor.   Psychiatric: She has a normal mood and affect. Her behavior is normal.   Vitals reviewed.    Results Review:  Recent Results (from the past 12 hour(s))   Basic Metabolic Panel    Collection Time: 02/15/17  4:47 AM   Result Value Ref Range    Glucose 105 (H) 70 - 100 mg/dL    BUN 21 5 - 21 mg/dL    Creatinine 0.59 0.50 - 1.40 mg/dL    Sodium 130 (L) 135 - 145 mmol/L    Potassium 4.6 3.5 - 5.3 mmol/L    Chloride 100 98 - 110 mmol/L    CO2 23.0 (L) 24.0 - 31.0 mmol/L    Calcium 8.9 8.4 - 10.4 mg/dL    eGFR Non African Amer 97 >60 mL/min/1.73    BUN/Creatinine Ratio 35.6 (H) 7.0 - 25.0    Anion Gap 7.0 4.0 - 13.0 mmol/L   CBC (No Diff)    Collection Time: 02/15/17  4:47 AM   Result Value Ref Range    WBC 15.76 (H) 4.80 - 10.80 10*3/mm3    RBC 3.92 (L) 4.20 - 5.40 10*6/mm3    Hemoglobin 12.5 12.0 - 16.0 g/dL    Hematocrit 36.7 (L) 37.0 - 47.0 %    MCV 93.6 82.0 - 98.0 fL    MCH 31.9 28.0 - 32.0 pg    MCHC 34.1 33.0 - 36.0 g/dL    RDW 14.4 12.0 - 15.0 %    RDW-SD 48.7 40.0 - 54.0 fl    MPV 10.8 6.0 - 12.0 fL    Platelets 206 130 - 400 10*3/mm3     Cultures:  BLOOD CULTURE   Date Value Ref Range Status   02/14/2017 No growth at less than 24 hours  Preliminary     URINE CULTURE   Date Value Ref Range Status   02/14/2017 >100,000 CFU/mL Escherichia coli (A)  Preliminary       Intake/Output Summary (Last 24 hours) at 02/15/17 0907  Last data filed at 02/14/17 1752   Gross per 24 hour   Intake   1000 ml   Output      0 ml   Net   1000 ml       Allergies   Allergen Reactions   • Albuterol    • Augmentin  [Amoxicillin-Pot Clavulanate]    • Cefdinir    • Cefprozil    • Doxycycline    • Erythromycin    • Moxifloxacin    • Sulfa Antibiotics        Scheduled meds:     ertapenem 1 g Intravenous Q24H    losartan 50 mg Oral Daily   metoprolol succinate XL 12.5 mg Oral Nightly   nystatin  Topical Q12H   predniSONE 10 mg Oral Daily With Breakfast   sennosides-docusate sodium 2 tablet Oral BID       PRN meds:  •  acetaminophen  •  bisacodyl  •  HYDROcodone-acetaminophen  •  Morphine  •  ondansetron  •  oxyCODONE-acetaminophen  •  sodium chloride    Assessment/Plan   Assessment:  1. Recurrent falls.  2. Altered mental status, likely secondary to recurrent UTI.  3. Acute, mildly displaced left intertrochanteric hip fracture.  4. Subacute or chronic right sacral ala insufficiency fracture.  5. E. coli UTI.  6. Hyponatremia, chronic, SIADH.   7. History of ulcerative colitis.  8. History of Clostridium difficile colitis.  9. Hyperlipidemia.  10.  Interstitial opacities, pulmonary edema versus interstitial pneumonitis, asymptomatic..   11.  Hypertension.   12. Poor pain control    Plan:  1. OR today approximately 4pm to repair hip fracture  2. Increase frequency MS 1mg to q 2 hours prn  3. Day 1 Invanz  4. Continue fluid restriction  5. PT/OT after surgery  6. Labs in am: CBC and BMP  7. Continuous pulse ox while on increased narcotic use    Discharge Planning:  Currently resides at [facility] in [place], [place], may need rehab placement after d/c    OLMAN Aguilar   02/15/17   9:57 AM     Case discussed with OLMAN Castro and agree with the assessment, treatment plan, and disposition of the patient as recorded by her.     The patient is in the operative suite at the time of my visit.  She is to undergo cephalo-medullary nailing of her hip fracture with Dr. Pastor Lee.  I was able to discuss the case with Dr. Yin and his wife, Loulou today.  I placed the patient on Invanz this morning for an Escherichia coli urinary tract infection.  She has a history of recurrent UTI will be at risk for multidrug resistant organisms.  She also has numerous antibiotic allergies which makes selection of  an appropriate antibiotic difficult.  We will follow the culture and sensitivities for more appropriate antibiotic de-escalation and also possible transition to oral antibiotic.    Repeat chest x-ray postoperatively.  She had possible pulmonary edema versus interstitial pneumonitis presentation.  However, was asymptomatic at that point in time.  Her BNP was also normal at 320.    Internal medicine will continue to follow patient.    The Yin' request [facility] at discharge.    Yusef Hay DO  02/15/17  4:07 PM

## 2017-02-15 NOTE — NURSING NOTE
spoke with patient's niece LAZARO. .Sarah Yin in regards to DNR status. He explained Anesthesia policy and protocol and at this time DNR  Lifted for surgery

## 2017-02-15 NOTE — SIGNIFICANT NOTE
Attempted to see pt for swallow eval this morning, however the pt is NPO for a possible hip replacement today.     02/15/17 0826   Rehab Treatment   Discipline speech language pathologist   Rehab Evaluation   Evaluation Not Performed other (see comments)  (Pt NPO for possible hip replacement.)   Alin Ward, CCC-SLP 2/15/2017 8:27 AM

## 2017-02-15 NOTE — ANESTHESIA PROCEDURE NOTES
Airway  Urgency: elective    Airway not difficult    General Information and Staff    Patient location during procedure: OR  CRNA: CULLEN GRANADOS    Indications and Patient Condition  Indications for airway management: airway protection    Preoxygenated: yes  Mask difficulty assessment: 1 - vent by mask    Final Airway Details  Final airway type: endotracheal airway      Successful airway: ETT  Cuffed: yes   Successful intubation technique: direct laryngoscopy  Endotracheal tube insertion site: oral  Blade: Manzo  Blade size: #2  ETT size: 7.0 mm  Cormack-Lehane Classification: grade IIa - partial view of glottis  Placement verified by: chest auscultation and capnometry   Cuff volume (mL): 8  Measured from: lips  ETT to lips (cm): 21  Number of attempts at approach: 1    Additional Comments  Atraumatic intubation

## 2017-02-15 NOTE — ANESTHESIA POSTPROCEDURE EVALUATION
Patient: Trcaee Neal    Procedure Summary     Date Anesthesia Start Anesthesia Stop Room / Location    02/15/17 1526 1659  [facility] OR 11 / [facility] OR       Procedure Diagnosis Surgeon Provider    HIP TROCANTERIC NAILING SHORT WITH INTRAMEDULLARY HIP SCREW (Left Hip) (FRACTURED LEFT HIP) Pastor Lee MD No responsible provider of record.          Anesthesia Type: general  Last vitals  /78 (02/15/17 1730)    Temp 98 °F (36.7 °C) (02/15/17 1715)    Pulse 84 (02/15/17 1730)   Resp 14 (02/15/17 1730)    SpO2 98 % (02/15/17 1730)      Post Anesthesia Care and Evaluation    Patient location during evaluation: PACU  Patient participation: complete - patient participated  Level of consciousness: awake and alert  Pain management: adequate  Airway patency: patent  Anesthetic complications: No anesthetic complications  PONV Status: none  Cardiovascular status: acceptable  Respiratory status: acceptable  Hydration status: acceptable

## 2017-02-15 NOTE — PLAN OF CARE
Problem: Patient Care Overview (Adult)  Goal: Plan of Care Review  Outcome: Ongoing (interventions implemented as appropriate)    Problem: Confusion, Acute (Adult)  Goal: Cognitive/Functional Impairments Minimized  Outcome: Ongoing (interventions implemented as appropriate)

## 2017-02-15 NOTE — ANESTHESIA PREPROCEDURE EVALUATION
Anesthesia Evaluation     Patient summary reviewed   no history of anesthetic complications:  NPO Status: > 8 hours   Airway   Mallampati: III  TM distance: >3 FB  Neck ROM: full  Dental      Pulmonary    (+) COPD,   (-) sleep apnea, not a smoker    ROS comment: wegners on chronic steroids  Cardiovascular   Exercise tolerance: poor (<4 METS)    Patient on routine beta blocker and Beta blocker given within 24 hours of surgery    (+) hypertension,   (-) pacemaker, past MI, angina, cardiac stents      Neuro/Psych  (+) TIA,    (-) seizures, CVA    ROS Comment: Mild dementia  Chronic SIADH with hyponatremia  GI/Hepatic/Renal/Endo    (+) obesity,  GERD,   (-) liver disease, renal disease, diabetes    ROS Comment: Ulcerative colitis    Musculoskeletal     Abdominal    Substance History      OB/GYN          Other                                  Anesthesia Plan    ASA 3     general   (Pt is DNR. Had discussion with her POA about code status and anesthesia and came to agreement that measures will be taken periop for any issues felt to be a direct cause of anesthesia meds.)  intravenous induction   Anesthetic plan and risks discussed with legal guardian.  Use of blood products discussed with legal guardian .

## 2017-02-15 NOTE — PROGRESS NOTES
"    Chief complaint:   Chief Complaint   Patient presents with   • Fall   • Altered Mental Status        Subjective     Improved mental status      Objective      Vital Signs  Visit Vitals   • /78 (BP Location: Right arm, Patient Position: Lying)   • Pulse 86   • Temp 98.9 °F (37.2 °C) (Tympanic)   • Resp 18   • Ht 59\" (149.9 cm)   • Wt 150 lb 14.4 oz (68.4 kg)   • SpO2 95%   • BMI 30.48 kg/m2       Physical Exam    NAD  AAox3  MAEW    Results Review: Xr Spine Thoracic 2 Vw    Result Date: 2/7/2017  Narrative: EXAMINATION: XR SPINE THORACIC 2 VW-  2/7/2017 9:24 AM CST  HISTORY: fall; W19.XXXA-Unspecified fall, initial encounter  Three-view thoracic spine series compared with a lateral chest x-ray from 02/03/2017.  Osteopenia with multilevel disc space narrowing and endplate spurring. No malalignment or thoracic compression fracture.  Chronic anterolisthesis noted at L4 on L5.  Cervicothoracic and thoracolumbar junction alignment is appropriate.  No significant scoliosis.  Summary: 1. Multilevel degenerative disc and endplate change with no evidence of an acute fracture.   This report was finalized on 02/07/2017 09:52 by Dr. Jonathon Gómez MD.    Xr Spine Lumbar 2 Or 3 View    Result Date: 2/7/2017  Narrative: EXAMINATION: XR SPINE LUMBAR 2 OR 3 VW-  2/7/2017 9:25 AM CST  HISTORY: FALL; W19.XXXA-Unspecified fall, initial encounter  Five-view lumbar spine series compared with 08/21/2012.  Left convex lumbar scoliosis has progressed and now measures 12 degrees.  Multilevel asymmetric disc space narrowing and endplate spurring.  Symmetric SI joints.  Osteopenic bones. Facet joints align appropriately on the oblique images.  The lateral view shows 8-9 mm chronic anterolisthesis of L4 on L5.  No vertebral body compression fracture.  Summary: 1. Mild scoliosis with extensive degenerative disc and endplate change. 2. No acute fracture and no significant change from the previous exam.   This report was finalized on " 02/07/2017 09:53 by Dr. Jonathon Gómez MD.    Xr Shoulder 2+ View Left    Result Date: 2/14/2017  Narrative: EXAMINATION: XR SHOULDER 2+ VW LEFT- 2/14/2017 5:18 PM CST  HISTORY: Trauma.  REPORT: 2 views of the left shoulder were obtained. There are no comparison studies.  Alignment is normal, no fractures identified. The joint spaces are preserved. There is mild spurring at the undersurface of the acromioclavicular joint and inferior glenoid process.      Impression: No acute osseous abnormality. Mild degenerative changes are present. This report was finalized on 02/14/2017 17:19 by Dr. Farrukh Cantu MD.    Ct Head Without Contrast    Result Date: 2/14/2017  Narrative: HISTORY: Confusion, fell, .  CT SCAN OF THE HEAD WITHOUT CONTRAST: Comparison study dated 12/03/2016.  Diffuse atrophy and chronic small vessel ischemic changes felt present. Some left frontal cortical inner table thickening is stable and suggests a possible minimal chronic meningioma. There is no midline shift or extra axial fluid collection noted.  Vascular calcifications noted diffusely. There is no evidence of edema or hemorrhage. Basal ganglia calcifications are slightly asymmetrical.  Some lucencies in the left parietal bone appears stable and do not appear to represent acute fractures.  Reformatted images are reviewed. High left parietal scalp swelling near the vertex.  IMPRESSIONS: 1. Atrophy and chronic changes. No evidence of acute hemorrhage or hematoma. 2. Some high left parietal scalp hematoma near the vertex. No associated acute fracture. 3. Vascular calcifications present. This report was finalized on 02/14/2017 16:24 by Dr. Nickolas Villanueva MD.    Ct Cervical Spine Without Contrast    Result Date: 2/14/2017  Narrative: EXAMINATION: CT CERVICAL SPINE WO CONTRAST-   2/14/2017 3:00 PM CST  HISTORY: trauma; R53.1-Weakness  Fall related injury. Neck pain.  In order to have a CT radiation dose as low as reasonably achievable Automated  Exposure Control was utilized for adjustment of the mA and/or KV according to patient size.  DLP in mGycm= 211.  Routine protocol noncontrast CT imaging of the cervical spine compared with 06/22/2015.  Diffuse osteopenia. Curvature and alignment is appropriate.  There is multilevel disc space narrowing and endplate spurring most prominent at C3-4, C5-6, and C6-7.  Facet joints spurring is noted. Facet joints align appropriately. Prevertebral soft tissues are appropriate.  No significant scoliosis.  Bilateral carotid artery calcification is noted.  Summary: 1. Extensive degenerative disc and endplate change with no evidence of fracture.          This report was finalized on 02/14/2017 15:24 by Dr. Jonathon Gómez MD.    Ct Thoracic Spine Without Contrast    Result Date: 2/14/2017  Narrative: EXAM:   CT THORACIC SPINE WO CONTRAST-   DATE:  02/14/2017  HISTORY:  Female, age  84 years;trauma; R53.1-Weakness  COMPARISON:  None.  FINDINGS: There is a subtle grade 1 anterolisthesis of C7 on T1 and C4 on C5. There is no evidence of acute compression deformity. The demineralized skeleton does limit bony details for nondisplaced acute fracture. The prevertebral and posterior paraspinal soft tissues are unremarkable. There are multilevel degenerative changes in the thoracic spine. No suspicious lytic or blastic lesion.      Impression: 1.  No acute osseous pathology. 2.  Chronic findings as described above.  This report was finalized on 02/14/2017 17:16 by Dr. Perlita Fountain MD.    Ct Lumbar Spine Without Contrast    Result Date: 2/14/2017  Narrative: EXAMINATION: CT LUMBAR SPINE WO CONTRAST-   2/14/2017 3:00 PM CST  HISTORY: trauma; R53.1-Weakness Fall. Back pain.  In order to have a CT radiation dose as low as reasonably achievable Automated Exposure Control was utilized for adjustment of the mA and/or KV according to patient size.  DLP in mGycm= 844.  Routine protocol noncontrast axial, sagittal, and coronal lumbar spine CT  imaging.  Diffuse osteopenia.  13 degrees left convex lumbar scoliosis. Associated asymmetric disc space narrowing and endplate spurring.  Sagittal imaging shows 4-5 mm anterolisthesis at L4-5. Transitional lumbosacral segment with sacralization of L5.  Multilevel disc space narrowing, endplate spurring, and degenerative vacuum disc change.  No compression fracture.  Sclerosis and cortical irregularity compatible with a right sacral ala insufficiency fracture  Summary: 1. Extensive degenerative disc and endplate change with no acute lumbar spine fracture. 2. Subacute or chronic right sacral ala insufficiency fracture present.          This report was finalized on 02/14/2017 15:41 by Dr. Jonathon Gómez MD.    Mri Brain Without Contrast    Result Date: 2/14/2017  Narrative: EXAMINATION: MRI BRAIN WO CONTRAST- 2/14/2017 5:49 PM CST  HISTORY: Since speech disturbance.  REPORT: Multiplanar multisequence MR imaging of the brain was performed without contrast. Comparison is made with previous MRI from 11/04/2014 and head CT from 02/14/2017.  The diffusion-weighted images show no diffusion restriction, there is motion artifact which degrades the study. The sagittal T1-weighted images show a grossly normal appearance of the midline structures including the pituitary gland, brainstem and corpus callosum. There is mild to moderate diffuse cortical atrophy. Gradient echo images show no evidence of intracranial hemorrhage, given the limitations of motion artifact. The ventricles and basal cisterns are within normal limits. There is a large amount of confluent FLAIR and T2 hyperintense signal within the subcortical and periventricular right matter tracts compatible with advanced chronic small vessel white matter ischemic disease. This appears unchanged. MR signal is seen within the deep white matter tracts within the joseluis and brainstem. There is no mass effect.      Impression: Limited study due to patient motion shows no acute  intracranial abnormality. There are severe chronic small vessel white matter ischemic changes as well as moderate diffuse atrophy. This report was finalized on 02/14/2017 17:51 by Dr. Farrukh Cantu MD.    Xr Chest Pa & Lateral    Result Date: 2/14/2017  Narrative: EXAMINATION: XR CHEST PA AND LATERAL- 2/14/2017 5:15 PM CST  HISTORY: Shortness of breath.  REPORT: Comparison is made with the study from 02/03/2017.  Coarse interstitial infiltrates have increased, most likely interstitial edema, there is mild cardiomegaly. There is moderate thoracic aortic ectasia. No lung consolidation is identified. No pneumothorax or pleural effusion seen. The osseous structures are within normal limits.      Impression: Interstitial opacities likely related to edema and volume overload. Interstitial pneumonitis is also in the differential. There is no consolidating pneumonia. Underlying COPD is evident. This report was finalized on 02/14/2017 17:16 by Dr. Farrukh Cantu MD.    Xr Chest Pa & Lateral    Result Date: 2/3/2017  Narrative: EXAMINATION: XR CHEST PA AND LATERAL- 2/3/2017 2:11 PM CST  HISTORY: Cough and congestion, history of lung nodules.  REPORT: Comparison is made with the study from 12/03/2016.  Lungs are hyperlucent, there are coarse interstitial markings that appear stable. No focal infiltrate or lung consolidation is identified. Heart size is normal. There is ectasia of the thoracic aorta. There is adenosine the right apex which is stable most likely related to pleural based scarring. No pneumothorax or pleural effusion is seen. There are moderate degenerative changes seen throughout the thoracic and lumbar spine and one level of significant spondylolisthesis is identified in the lumbar spine, the exact level is not determined.      Impression: Moderate COPD with mild interstitial disease, no acute cardiopulmonary abnormality. Chronic findings as detailed above. This report was finalized on 02/03/2017 14:13 by   Farrukh Cantu MD.    Xr Hip With Or Without Pelvis 2 - 3 View Left    Result Date: 2/14/2017  Narrative: EXAMINATION: XR HIP W OR WO PELVIS 2-3 VIEW LEFT- 2/14/2017 5:19 PM CST  HISTORY: Left hip pain.  REPORT: An AP view the pelvis and to separate views of the left hip were obtained. There are no comparison studies.  There is a complete closed mildly displaced acute intertrochanteric left fracture, this is extra-articular. The hip joint spaces are preserved. The right hip is normal. The other osseous structures are unremarkable except for moderate degenerative changes of the lower lumbar spine.      Impression: Acute complete closed and mildly displaced left intertrochanteric hip fracture. This report was finalized on 02/14/2017 17:21 by Dr. Farrukh Cantu MD.              Assessment/Plan:    Fall with left hip fracture; await orthopedic recs    I discussed the patients findings and my recommendations with patient  Ptaricio Neal MD  02/15/17  8:04 AM

## 2017-02-15 NOTE — OP NOTE
OPERATIVE NOTE      DATE OF PROCEDURE:  2/15/17     PREOPERATIVE DIAGNOSIS: Left intertrochanteric femur fracture.     POSTOPERATIVE DIAGNOSIS: Left intertrochanteric femur fracture.     PROCEDURE PERFORMED: Cephalomedullary nailing of left intertrochanteric femur fracture.       SURGEON: Pastor Lee MD     ASSISTANT:  Charli Burgos PA-C     ANESTHESIA: General endotracheal.       ESTIMATED BLOOD LOSS: Minimal.     COMPLICATIONS: None.     CONDITION: Stable.       An assistant surgeon was scrubbed and present throughout the entire procedure. He assisted in limb position as well as retractor placement. He also helped hold the fracture reduced while hardware was placed.       HISTORY OF PRESENT ILLNESS: This is an 84-year-old patient who fell. The patient complained of immediate onset of left hip pain. The patient was brought to the emergency department where x-ray demonstrated a displaced left intertrochanteric femur fracture. Based on this, the decision was made to take the patient to the operating room for the above-mentioned procedure.       DESCRIPTION OF PROCEDURE: The patient was interviewed in the preanesthesia area where the operative hip was marked with a marking pen. The patient was then taken to the operative suite where general endotracheal anesthesia was performed by the anesthesia team. A timeout was then called to confirm the patient, the operative site, as well as the planned procedure and administration of antibiotics. The patient was then positioned on a fracture table in a supine position with the operative limb in a well-padded traction boot and the nonoperative limb in a well-padded leg rodgers. The patient was placed over a padded perineal post. X-ray was brought in to make sure we could get adequate x-rays.       The patient was then prepped and draped in a standard sterile fashion using ChloraPrep. Once fully prepped and draped, a standard lateral incision was carried out just proximal to  the tip of the trochanter. Electrocautery dissection was carried down to the gluteofemoral fascia which was divided in line with its fibers. A guide pin was then placed at the tip of the trochanter and checked on the AP and lateral views and advanced down the center of the canal. The Synthes entry reamer was then used to enter the femoral canal proximally.       At this point in time, a size 11 short Synthes TFN nail was placed down the femoral canal. An outrigger guide was placed and a small incision was made laterally. Careful dissection was carried down to the IT band which was then split in line with its fibers. A guide pin was then advanced into the center, center position on the AP and lateral views of the femoral head with the hip reduced. We then drilled the outer cortex and reamed to 5 mm short of the guide pin. A Synthes helical blade was then placed over the guide pin approximately 5-10 mm from the subchondral articular surface. This was checked on an AP and lateral view.       Attention was then turned to the distal portion of the nail where a bicortical locking bolt was placed through the distal aspect of the nail through the outrigger guide. The nail was locked proximally and the outrigger was removed.       AP and lateral views confirmed appropriate placement of hardware with appropriate reduction of the fracture.     The wounds were then copiously irrigated with bulb syringe lavage. The deep tissue was closed with a #1 Vicryl suture. The skin was then approximated with Vicryl suture and closed with staples. The patient awoke from anesthesia without difficulty and was transferred to the PACU in stable condition. All sponge, needle and instrument counts were correct at the end of the procedure.     cc:           Pastor Lee M.D

## 2017-02-15 NOTE — PROGRESS NOTES
Discharge Planning Assessment  [facility]     Patient Name: Tracee Neal  MRN: [id number]  Today's Date: 2/15/2017    Admit Date: 2/14/2017          Discharge Needs Assessment       02/15/17 1135    Living Environment    Lives With facility resident    Living Arrangements assisted living   [facility]    Quality Of Family Relationships supportive;helpful;involved    Able to Return to Prior Living Arrangements no    Discharge Needs Assessment    Concerns To Be Addressed adjustment to diagnosis/illness concerns    Readmission Within The Last 30 Days no previous admission in last 30 days    Outpatient/Agency/Support Group Needs skilled nursing facility (specify)    [facility] Name[facility]    Anticipated Changes Related to Illness inability to care for self    Discharge Facility/Level Of Care Needs skilled transitional care    Transportation Available ambulance    Discharge Planning Comments Pt is from [facility]. She is having hip surgery this afternoon so she will need rehab at d/c. Pt's niece (POA) requests [facility] because she has been there before. Referral called to Richelle at [facility] [phone]. Informed Richelle that pt will be having surgery today and may d/c on Friday.             Discharge Plan     None        Discharge Placement     No information found                Demographic Summary     None            Functional Status     None            Psychosocial     None            Abuse/Neglect     None            Legal     None            Substance Abuse     None            Patient Forms     None          NAKIA Anderson

## 2017-02-15 NOTE — CONSULTS
MD Charli Carnes PA-C, MOISÉS Zamora PA-C, MOISÉS       Orthopaedic Surgery Consult Note      2/15/2017   8:35 AM    Name:  Tracee Neal  MRN:    [id number]     Acct:     [id number]   Room:  [address] [place] [address] Day: 1     Admit Date: 2/14/2017  PCP: Pascual Patten MD        Subjective:     C/C: Hip Pain  Patient complains of left hip pain. Onset of the symptoms was yesterday. Inciting event: fell while at the [employer].. The patient reports the hip pain is worse with weight bearing. Associated symptoms: injury. Aggravating symptoms include: any weight bearing. Patient has had no prior hip problems. Previous visits for this problem: none. Evaluation to date: plain films, which were abnormal  closed displaced left intertrochanteric femur fracture. Treatment to date: prescription analgesics, which have been somewhat effective.. We are seeing this patient in consultation for an orthopaedic evaluation.    Pain: 4/10      Code Status:  Conditional Code      Past Medical History:    Past Medical History   Diagnosis Date   • Anxiety    • Arthritis    • Clostridium difficile infection    • Concussion    • COPD (chronic obstructive pulmonary disease)    • Fall    • History of transfusion    • Hypertension    • Inappropriate vasopressin secretion syndrome    • Irregular heart rhythm    • TIA (transient ischemic attack)    • Ulcerative colitis        Past Surgical History:    Past Surgical History   Procedure Laterality Date   • Cholecystectomy     • Replacement total knee bilateral     • Eye surgery         Current Medications:   Prior to Admission medications    Medication Sig Start Date End Date Taking? Authorizing Provider   acetaminophen (TYLENOL) 325 MG tablet Take 650 mg by mouth 2 (two) times a day as needed for mild pain (1-3).   Yes Historical Provider, MD   aspirin 81 MG EC tablet Take 81 mg by mouth every evening.   Yes Historical Provider, MD   losartan (COZAAR) 50 MG tablet Take 50 mg by  mouth daily.   Yes Historical Provider, MD   mesalamine (ASACOL) 400 MG EC tablet Take 800 mg by mouth 2 (two) times a day.   Yes Historical Provider, MD   metoprolol succinate XL (TOPROL-XL) 25 MG 24 hr tablet Take 12.5 mg by mouth Every Evening.   Yes Historical Provider, MD   predniSONE (DELTASONE) 10 MG tablet Take 1 tablet by mouth Daily With Breakfast. 10/3/16  Yes OLMAN Hickey   docusate sodium 100 MG capsule Take 100 mg by mouth 2 (Two) Times a Day As Needed for constipation. 10/3/16   OLMAN Hickey   hydrocortisone 1 % cream 30 application, zinc oxide 20 % ointment 30 application, bacitracin 500 UNIT/GM ointment 30 application, nystatin 416412 UNIT/GM cream 30 application Apply 1 application topically As Needed (skin irritation). 10/3/16   OLMAN Hickey   nystatin (NYAMYC) 338474 UNIT/GM powder topical powder Apply  topically Every 8 (Eight) Hours. 12/6/16   OLMAN Patel   ondansetron (ZOFRAN) 4 MG tablet Take 4 mg by mouth every 8 (eight) hours as needed for nausea or vomiting.    Historical Provider, MD   pantoprazole (PROTONIX) 40 MG EC tablet Take 40 mg by mouth daily.    Historical Provider, MD       Allergies:  Albuterol; Augmentin  [amoxicillin-pot clavulanate]; Cefdinir; Cefprozil; Doxycycline; Erythromycin; Moxifloxacin; and Sulfa antibiotics    Social History:   Social History     Social History   • Marital status: Single     Spouse name: N/A   • Number of children: N/A   • Years of education: N/A     Occupational History   • Not on file.     Social History Main Topics   • Smoking status: Never Smoker   • Smokeless tobacco: Not on file   • Alcohol use No   • Drug use: No   • Sexual activity: Defer     Other Topics Concern   • Not on file     Social History Narrative       Family History:   History reviewed. No pertinent family history.        REVIEW OF SYSTEMS:    CONSTITUTIONAL:  negative for  fevers, chills, sweats, fatigue, malaise, anorexia and weight  "loss  EYES:  negative for  double vision, blurred vision and visual disturbance  HEENT:  negative for  hearing loss, tinnitus, ear drainage, earaches, nasal congestion, epistaxis, snoring, sore mouth, sore throat, hoarseness and voice change  RESPIRATORY:  negative for  dry cough, cough with sputum, dyspnea, wheezing, hemoptysis, chest pain, pleuritic pain and cyanosis  CARDIOVASCULAR:  negative for  chest pain, palpitations, orthopnea, exertional chest pressure/discomfort, edema  GASTROINTESTINAL:  negative for nausea, vomiting, change in bowel habits, diarrhea, constipation, abdominal pain, jaundice, dysphagia, regurgitation, hematemesis and hemtochezia  GENITOURINARY:  negative for frequency, dysuria, nocturia, hesitancy and hematuria  INTEGUMENT/BREAST:  negative for rash, skin lesion(s), dryness, skin color change, pruritus, changes in hair and changes in nails  HEMATOLOGIC/LYMPHATIC:  negative for easy bruising, bleeding, lymphadenopathy, petechiae and swelling/edema  ALLERGIC/IMMUNOLOGIC:  negative for recurrent infections and urticaria  ENDOCRINE:  negative for heat intolerance, cold intolerance, tremor, weight changes, hair loss and diabetic symptoms including neither polyuria nor polydipsia  MUSCULOSKELETAL:  As noted in the HPI  NEUROLOGICAL:  negative for headaches, dizziness, seizures, memory problems, speech problems, visual disturbance, coordination problems, gait problems, tremor, syncope and near syncope  BEHAVIOR/PSYCH:  negative for depressed mood, elated mood, increased agitation and anxiety        Vitals:  Visit Vitals   • /78 (BP Location: Right arm, Patient Position: Lying)   • Pulse 86   • Temp 98.9 °F (37.2 °C) (Tympanic)   • Resp 18   • Ht 59\" (149.9 cm)   • Wt 150 lb 14.4 oz (68.4 kg)   • SpO2 95%   • BMI 30.48 kg/m2     Temp (24hrs), Av.9 °F (36.6 °C), Min:97.3 °F (36.3 °C), Max:98.9 °F (37.2 °C)      I/O (24Hr):    Intake/Output Summary (Last 24 hours) at 02/15/17 0835  Last " data filed at 02/14/17 1752   Gross per 24 hour   Intake   1000 ml   Output      0 ml   Net   1000 ml       Labs:  Lab Results (last 72 hours)     Procedure Component Value Units Date/Time    CBC & Differential [[id number]] Collected:  02/14/17 1248    Specimen:  Blood Updated:  02/14/17 1506    Narrative:       The following orders were created for panel order CBC & Differential.  Procedure                               Abnormality         Status                     ---------                               -----------         ------                     CBC Auto Differential[[id number]]         Abnormal            Final result                 Please view results for these tests on the individual orders.    CBC Auto Differential [[id number]]  (Abnormal) Collected:  02/14/17 1248    Specimen:  Blood from Arm, Right Updated:  02/14/17 1506     WBC 15.33 (H) 10*3/mm3      RBC 4.33 10*6/mm3      Hemoglobin 14.0 g/dL      Hematocrit 41.1 %      MCV 94.9 fL      MCH 32.3 (H) pg      MCHC 34.1 g/dL      RDW 14.4 %      RDW-SD 49.8 fl      MPV 11.3 fL      Platelets 267 10*3/mm3      Neutrophil % 90.3 (H) %      Lymphocyte % 4.4 (L) %      Monocyte % 4.0 %      Eosinophil % 0.0 %      Basophil % 0.1 %      Immature Grans % 1.2 %      Neutrophils, Absolute 13.84 (H) 10*3/mm3      Lymphocytes, Absolute 0.67 (L) 10*3/mm3      Monocytes, Absolute 0.62 10*3/mm3      Eosinophils, Absolute 0.00 10*3/mm3      Basophils, Absolute 0.02 10*3/mm3      Immature Grans, Absolute 0.18 (H) 10*3/mm3     aPTT [[id number]]  (Normal) Collected:  02/14/17 1248    Specimen:  Blood from Arm, Right Updated:  02/14/17 1518     PTT 29.3 seconds     Protime-INR [[id number]]  (Normal) Collected:  02/14/17 1248    Specimen:  Blood from Arm, Right Updated:  02/14/17 1518     Protime 13.2 Seconds      INR 0.97     Comprehensive Metabolic Panel [[id number]]  (Abnormal) Collected:  02/14/17 1248    Specimen:  Blood from Arm, Right Updated:  02/14/17 1523     Glucose 163  (H) mg/dL      BUN 22 (H) mg/dL      Creatinine 0.77 mg/dL      Sodium 129 (L) mmol/L      Potassium 4.7 mmol/L      Chloride 96 (L) mmol/L      CO2 23.0 (L) mmol/L      Calcium 9.6 mg/dL      Total Protein 7.2 g/dL      Albumin 4.20 g/dL      ALT (SGPT) 39 U/L      AST (SGOT) 28 U/L      Alkaline Phosphatase 116 U/L      Total Bilirubin 1.3 (H) mg/dL      eGFR Non African Amer 71 mL/min/1.73      Globulin 3.0 gm/dL      A/G Ratio 1.4 g/dL      BUN/Creatinine Ratio 28.6 (H)      Anion Gap 10.0 mmol/L     Narrative:       The MDRD GFR formula is only valid for adults with stable renal function between ages 18 and 70.    BNP [[id number]]  (Normal) Collected:  02/14/17 1248    Specimen:  Blood from Arm, Right Updated:  02/14/17 1836     proBNP 320.0 pg/mL     Urinalysis With / Culture If Indicated [[id number]]  (Abnormal) Collected:  02/14/17 1844    Specimen:  Urine from Urine, Clean Catch Updated:  02/14/17 1914     Color, UA Dark Yellow (A)      Appearance, UA Cloudy (A)      pH, UA <=5.0      Specific Gravity, UA 1.024      Glucose, UA Negative      Ketones, UA Trace (A)      Bilirubin, UA Negative      Blood, UA Negative      Protein, UA 30 mg/dL (1+) (A)      Leuk Esterase, UA Small (1+) (A)      Nitrite, UA Negative      Urobilinogen, UA 1.0 E.U./dL     Urinalysis, Microscopic Only [[id number]]  (Abnormal) Collected:  02/14/17 1844    Specimen:  Urine from Urine, Clean Catch Updated:  02/14/17 1914     RBC, UA 0-2 (A) /HPF      WBC, UA 21-30 (A) /HPF      Bacteria, UA 4+ (A) /HPF      Squamous Epithelial Cells, UA None Seen /HPF      Hyaline Casts, UA None Seen /LPF      Methodology Automated Microscopy     Blood Culture [[id number]]  (Normal) Collected:  02/14/17 1248    Specimen:  Blood from Arm, Right Updated:  02/15/17 0401     Blood Culture No growth at less than 24 hours     CBC (No Diff) [[id number]]  (Abnormal) Collected:  02/15/17 0447    Specimen:  Blood Updated:  02/15/17 0535     WBC 15.76 (H) 10*3/mm3       RBC 3.92 (L) 10*6/mm3      Hemoglobin 12.5 g/dL      Hematocrit 36.7 (L) %      MCV 93.6 fL      MCH 31.9 pg      MCHC 34.1 g/dL      RDW 14.4 %      RDW-SD 48.7 fl      MPV 10.8 fL      Platelets 206 10*3/mm3     Basic Metabolic Panel [[id number]]  (Abnormal) Collected:  02/15/17 0447    Specimen:  Blood Updated:  02/15/17 0552     Glucose 105 (H) mg/dL      BUN 21 mg/dL      Creatinine 0.59 mg/dL      Sodium 130 (L) mmol/L      Potassium 4.6 mmol/L      Chloride 100 mmol/L      CO2 23.0 (L) mmol/L      Calcium 8.9 mg/dL      eGFR Non African Amer 97 mL/min/1.73      BUN/Creatinine Ratio 35.6 (H)      Anion Gap 7.0 mmol/L     Narrative:       The MDRD GFR formula is only valid for adults with stable renal function between ages 18 and 70.    Urine Culture [[id number]]  (Abnormal) Collected:  02/14/17 1844    Specimen:  Urine from Urine, Clean Catch Updated:  02/15/17 0653     Urine Culture >100,000 CFU/mL Escherichia coli (A)           Radiology:  Imaging Results (last 72 hours)     Procedure Component Value Units Date/Time    CT Cervical Spine Without Contrast [[id number]] Collected:  02/14/17 1523     Updated:  02/14/17 1527    Narrative:       EXAMINATION: CT CERVICAL SPINE WO CONTRAST-      2/14/2017 3:00 PM CST     HISTORY: trauma; R53.1-Weakness  Fall related injury. Neck pain.     In order to have a CT radiation dose as low as reasonably achievable  Automated Exposure Control was utilized for adjustment of the mA and/or  KV according to patient size.     DLP in mGycm= 211.     Routine protocol noncontrast CT imaging of the cervical spine compared  with 06/22/2015.     Diffuse osteopenia.  Curvature and alignment is appropriate.     There is multilevel disc space narrowing and endplate spurring most  prominent at C3-4, C5-6, and C6-7.     Facet joints spurring is noted. Facet joints align appropriately.  Prevertebral soft tissues are appropriate.     No significant scoliosis.     Bilateral carotid artery  calcification is noted.     Summary:  1. Extensive degenerative disc and endplate change with no evidence of  fracture.                             This report was finalized on 02/14/2017 15:24 by Dr. Jonathon Gómez MD.    CT Lumbar Spine Without Contrast [[id number]] Collected:  02/14/17 1530     Updated:  02/14/17 1543    Narrative:       EXAMINATION: CT LUMBAR SPINE WO CONTRAST-      2/14/2017 3:00 PM CST     HISTORY: trauma; R53.1-Weakness  Fall. Back pain.     In order to have a CT radiation dose as low as reasonably achievable  Automated Exposure Control was utilized for adjustment of the mA and/or  KV according to patient size.     DLP in mGycm= 844.     Routine protocol noncontrast axial, sagittal, and coronal lumbar spine  CT imaging.     Diffuse osteopenia.     13 degrees left convex lumbar scoliosis.  Associated asymmetric disc space narrowing and endplate spurring.     Sagittal imaging shows 4-5 mm anterolisthesis at L4-5.  Transitional lumbosacral segment with sacralization of L5.     Multilevel disc space narrowing, endplate spurring, and degenerative  vacuum disc change.     No compression fracture.     Sclerosis and cortical irregularity compatible with a right sacral ala  insufficiency fracture     Summary:  1. Extensive degenerative disc and endplate change with no acute lumbar  spine fracture.  2. Subacute or chronic right sacral ala insufficiency fracture present.                             This report was finalized on 02/14/2017 15:41 by Dr. Jonathon Gómez MD.    CT Head Without Contrast [[id number]] Collected:  02/14/17 1538     Updated:  02/14/17 1626    Narrative:       HISTORY: Confusion, fell, .     CT SCAN OF THE HEAD WITHOUT CONTRAST:  Comparison study dated 12/03/2016.     Diffuse atrophy and chronic small vessel ischemic changes felt present.  Some left frontal cortical inner table thickening is stable and suggests  a possible minimal chronic meningioma. There is no midline shift  or  extra axial fluid collection noted.     Vascular calcifications noted diffusely. There is no evidence of edema  or hemorrhage. Basal ganglia calcifications are slightly asymmetrical.     Some lucencies in the left parietal bone appears stable and do not  appear to represent acute fractures.     Reformatted images are reviewed. High left parietal scalp swelling near  the vertex.     IMPRESSIONS:  1. Atrophy and chronic changes. No evidence of acute hemorrhage or  hematoma.  2. Some high left parietal scalp hematoma near the vertex. No associated  acute fracture.  3. Vascular calcifications present.  This report was finalized on 02/14/2017 16:24 by Dr. Nickolas Villanueva MD.    XR Chest PA & Lateral [[id number]] Collected:  02/14/17 1715     Updated:  02/14/17 1718    Narrative:       EXAMINATION: XR CHEST PA AND LATERAL- 2/14/2017 5:15 PM CST     HISTORY: Shortness of breath.     REPORT: Comparison is made with the study from 02/03/2017.     Coarse interstitial infiltrates have increased, most likely interstitial  edema, there is mild cardiomegaly. There is moderate thoracic aortic  ectasia. No lung consolidation is identified. No pneumothorax or pleural  effusion seen. The osseous structures are within normal limits.       Impression:       Interstitial opacities likely related to edema and volume  overload. Interstitial pneumonitis is also in the differential. There is  no consolidating pneumonia. Underlying COPD is evident.  This report was finalized on 02/14/2017 17:16 by Dr. Farrukh Cantu MD.    CT Thoracic Spine Without Contrast [[id number]] Collected:  02/14/17 1537     Updated:  02/14/17 1718    Narrative:       EXAM:   CT THORACIC SPINE WO CONTRAST-       DATE:  02/14/2017      HISTORY:  Female, age  84 years;trauma; R53.1-Weakness     COMPARISON:  None.     FINDINGS:  There is a subtle grade 1 anterolisthesis of C7 on T1 and C4 on C5.  There is no evidence of acute compression deformity. The  demineralized  skeleton does limit bony details for nondisplaced acute fracture. The  prevertebral and posterior paraspinal soft tissues are unremarkable.  There are multilevel degenerative changes in the thoracic spine. No  suspicious lytic or blastic lesion.       Impression:       1.  No acute osseous pathology.  2.  Chronic findings as described above.     This report was finalized on 02/14/2017 17:16 by Dr. Perlita Fountain MD.    XR Shoulder 2+ View Left [[id number]] Collected:  02/14/17 1718     Updated:  02/14/17 1721    Narrative:       EXAMINATION: XR SHOULDER 2+ VW LEFT- 2/14/2017 5:18 PM CST     HISTORY: Trauma.     REPORT: 2 views of the left shoulder were obtained. There are no  comparison studies.     Alignment is normal, no fractures identified. The joint spaces are  preserved. There is mild spurring at the undersurface of the  acromioclavicular joint and inferior glenoid process.       Impression:       No acute osseous abnormality. Mild degenerative changes are  present.  This report was finalized on 02/14/2017 17:19 by Dr. Farrukh Cantu MD.    XR Hip With or Without Pelvis 2 - 3 View Left [[id number]] Collected:  02/14/17 1719     Updated:  02/14/17 1723    Narrative:       EXAMINATION: XR HIP W OR WO PELVIS 2-3 VIEW LEFT- 2/14/2017 5:19 PM CST     HISTORY: Left hip pain.     REPORT: An AP view the pelvis and to separate views of the left hip were  obtained. There are no comparison studies.     There is a complete closed mildly displaced acute intertrochanteric left  fracture, this is extra-articular. The hip joint spaces are preserved.  The right hip is normal. The other osseous structures are unremarkable  except for moderate degenerative changes of the lower lumbar spine.       Impression:       Acute complete closed and mildly displaced left  intertrochanteric hip fracture.  This report was finalized on 02/14/2017 17:21 by Dr. Farrukh Cantu MD.    MRI Brain Without Contrast [[id number]]  Collected:  02/14/17 1749     Updated:  02/14/17 1754    Narrative:       EXAMINATION: MRI BRAIN WO CONTRAST- 2/14/2017 5:49 PM CST     HISTORY:   Since speech disturbance.     REPORT: Multiplanar multisequence MR imaging of the brain was performed  without contrast. Comparison is made with previous MRI from 11/04/2014  and head CT from 02/14/2017.     The diffusion-weighted images show no diffusion restriction, there is  motion artifact which degrades the study. The sagittal T1-weighted  images show a grossly normal appearance of the midline structures  including the pituitary gland, brainstem and corpus callosum. There is  mild to moderate diffuse cortical atrophy. Gradient echo images show no  evidence of intracranial hemorrhage, given the limitations of motion  artifact. The ventricles and basal cisterns are within normal limits.  There is a large amount of confluent FLAIR and T2 hyperintense signal  within the subcortical and periventricular right matter tracts  compatible with advanced chronic small vessel white matter ischemic  disease. This appears unchanged. MR signal is seen within the deep white  matter tracts within the joseluis and brainstem. There is no mass effect.       Impression:       Limited study due to patient motion shows no acute  intracranial abnormality. There are severe chronic small vessel white  matter ischemic changes as well as moderate diffuse atrophy.  This report was finalized on 02/14/2017 17:51 by Dr. Farrukh Cantu MD.          Physical Exam:     PHYSICAL EXAM:      Physical Examination:  Vitals:   Vitals:    02/14/17 1954 02/15/17 0013 02/15/17 0400 02/15/17 0723   BP: 142/90 139/78 126/58 149/78   BP Location: Left arm Left arm  Right arm   Patient Position: Lying Lying Lying Lying   Pulse: 105 89 86 86   Resp: 18 18 16 18   Temp: 97.6 °F (36.4 °C) 98 °F (36.7 °C) 97.9 °F (36.6 °C) 98.9 °F (37.2 °C)   TempSrc: Temporal Artery  Temporal Artery  Temporal Artery  Tympanic   SpO2: 96%  95% 95% 95%   Weight: 150 lb 14.4 oz (68.4 kg)      Height:         General:  Appears stated age, no distress.  Orientation:  Alert and oriented to time, place, and person.  Mood and Affect:  Cooperative and pleasant.  Gait:  Resting comfortably in bed.  Cardiovascular:  Symmetric 1-2 plus pulses in upper and lower extremities.  Lymph:  No cervical or inguinal lymphadenopathy noted.  Sensation:  Grossly intact to light touch.  DTR:  Normal, no pathologic reflexes.  Coordination/balance:  Normal      Musculoskeletal:  Right upper extremity exam:  There is no tenderness to palpation about the shoulder, elbow, wrist or hand.  Unrestricted full function motion is present.  Stability is normal with provocative tests, 5/5 strength, and skin is normal.      Left upper extremity exam:  There is no tenderness to palpation about the shoulder, elbow, wrist or hand.  Unrestricted full function motion is present.  Stability is normal with provocative tests, 5/5 strength, and skin is normal.     Right lower extremity exam:  There is no tenderness to palpation about the hip, knee, ankle or foot.  Unrestricted full function motion is present.  Stability is normal with provocative tests, 5/5 strength, and skin is normal.     Left lower extremity exam:  There is tenderness over the anterior groin. She is NVI distally. Cap refill less than 2 seconds.     Radiology Review  My review of patient's x-rays shows the patient to have closed displaced left intertrochanteric femur fracture    Assessment:     Primary Problem  1. Closed displaced left intertrochanteric femur fracture, initial encounter.         Plan:   1. Short TFN left hip today.  2. Keep NWB left LE  3.  Keep NPO

## 2017-02-15 NOTE — PROGRESS NOTES
Continued Stay Note  TEA [facility]     Patient Name: Tracee Neal  MRN: [id number]  Today's Date: 2/15/2017    Admit Date: 2/14/2017          Discharge Plan       02/15/17 5470    Case Management/Social Work Plan    Plan [facility]    Patient/Family In Agreement With Plan yes    Additional Comments Referral has been sent to [facility]. Pt actually became inpatient today so she will not meet her 3 day stay until Saturday. [facility] accept. Updated pt's LAZARO Yin.               Discharge Codes     None            NAKIA Anderson

## 2017-02-16 LAB
ANION GAP SERPL CALCULATED.3IONS-SCNC: 7 MMOL/L (ref 4–13)
BACTERIA SPEC AEROBE CULT: ABNORMAL
BUN BLD-MCNC: 17 MG/DL (ref 5–21)
BUN/CREAT SERPL: 29.8 (ref 7–25)
CALCIUM SPEC-SCNC: 8.7 MG/DL (ref 8.4–10.4)
CHLORIDE SERPL-SCNC: 101 MMOL/L (ref 98–110)
CO2 SERPL-SCNC: 23 MMOL/L (ref 24–31)
CREAT BLD-MCNC: 0.57 MG/DL (ref 0.5–1.4)
DEPRECATED RDW RBC AUTO: 48.3 FL (ref 40–54)
ERYTHROCYTE [DISTWIDTH] IN BLOOD BY AUTOMATED COUNT: 14.1 % (ref 12–15)
GFR SERPL CREATININE-BSD FRML MDRD: 101 ML/MIN/1.73
GLUCOSE BLD-MCNC: 106 MG/DL (ref 70–100)
HCT VFR BLD AUTO: 32.1 % (ref 37–47)
HGB BLD-MCNC: 10.9 G/DL (ref 12–16)
MCH RBC QN AUTO: 31.6 PG (ref 28–32)
MCHC RBC AUTO-ENTMCNC: 34 G/DL (ref 33–36)
MCV RBC AUTO: 93 FL (ref 82–98)
PLATELET # BLD AUTO: 153 10*3/MM3 (ref 130–400)
PMV BLD AUTO: 10.5 FL (ref 6–12)
POTASSIUM BLD-SCNC: 4.3 MMOL/L (ref 3.5–5.3)
RBC # BLD AUTO: 3.45 10*6/MM3 (ref 4.2–5.4)
SODIUM BLD-SCNC: 131 MMOL/L (ref 135–145)
WBC NRBC COR # BLD: 14.87 10*3/MM3 (ref 4.8–10.8)

## 2017-02-16 PROCEDURE — G8987 SELF CARE CURRENT STATUS: HCPCS | Performed by: OCCUPATIONAL THERAPIST

## 2017-02-16 PROCEDURE — G8997 SWALLOW GOAL STATUS: HCPCS

## 2017-02-16 PROCEDURE — 63710000001 PREDNISONE PER 5 MG: Performed by: NURSE PRACTITIONER

## 2017-02-16 PROCEDURE — G8988 SELF CARE GOAL STATUS: HCPCS | Performed by: OCCUPATIONAL THERAPIST

## 2017-02-16 PROCEDURE — 97165 OT EVAL LOW COMPLEX 30 MIN: CPT | Performed by: OCCUPATIONAL THERAPIST

## 2017-02-16 PROCEDURE — G8978 MOBILITY CURRENT STATUS: HCPCS

## 2017-02-16 PROCEDURE — 97162 PT EVAL MOD COMPLEX 30 MIN: CPT

## 2017-02-16 PROCEDURE — 97110 THERAPEUTIC EXERCISES: CPT

## 2017-02-16 PROCEDURE — 97530 THERAPEUTIC ACTIVITIES: CPT

## 2017-02-16 PROCEDURE — G8998 SWALLOW D/C STATUS: HCPCS

## 2017-02-16 PROCEDURE — G8996 SWALLOW CURRENT STATUS: HCPCS

## 2017-02-16 PROCEDURE — 80048 BASIC METABOLIC PNL TOTAL CA: CPT | Performed by: ORTHOPAEDIC SURGERY

## 2017-02-16 PROCEDURE — 85027 COMPLETE CBC AUTOMATED: CPT | Performed by: ORTHOPAEDIC SURGERY

## 2017-02-16 PROCEDURE — 92610 EVALUATE SWALLOWING FUNCTION: CPT

## 2017-02-16 PROCEDURE — G8979 MOBILITY GOAL STATUS: HCPCS

## 2017-02-16 RX ORDER — NITROFURANTOIN 25; 75 MG/1; MG/1
100 CAPSULE ORAL EVERY 12 HOURS SCHEDULED
Status: DISCONTINUED | OUTPATIENT
Start: 2017-02-16 | End: 2017-02-18 | Stop reason: HOSPADM

## 2017-02-16 RX ORDER — SACCHAROMYCES BOULARDII 250 MG
250 CAPSULE ORAL 2 TIMES DAILY
Status: DISCONTINUED | OUTPATIENT
Start: 2017-02-16 | End: 2017-02-18 | Stop reason: HOSPADM

## 2017-02-16 RX ADMIN — SODIUM CHLORIDE 100 ML/HR: 9 INJECTION, SOLUTION INTRAVENOUS at 03:46

## 2017-02-16 RX ADMIN — LOSARTAN POTASSIUM 50 MG: 50 TABLET, FILM COATED ORAL at 08:12

## 2017-02-16 RX ADMIN — OXYCODONE HYDROCHLORIDE AND ACETAMINOPHEN 1 TABLET: 5; 325 TABLET ORAL at 20:21

## 2017-02-16 RX ADMIN — HYDROCODONE BITARTRATE AND ACETAMINOPHEN 1 TABLET: 5; 325 TABLET ORAL at 09:52

## 2017-02-16 RX ADMIN — RIVAROXABAN 10 MG: 10 TABLET, FILM COATED ORAL at 17:33

## 2017-02-16 RX ADMIN — NITROFURANTOIN MONOHYDRATE/MACROCRYSTALLINE 100 MG: 25; 75 CAPSULE ORAL at 21:44

## 2017-02-16 RX ADMIN — DOCUSATE SODIUM AND SENNOSIDES 2 TABLET: 8.6; 5 TABLET, FILM COATED ORAL at 17:33

## 2017-02-16 RX ADMIN — DOCUSATE SODIUM AND SENNOSIDES 2 TABLET: 8.6; 5 TABLET, FILM COATED ORAL at 08:12

## 2017-02-16 RX ADMIN — CLINDAMYCIN PHOSPHATE 900 MG: 18 INJECTION, SOLUTION INTRAVENOUS at 06:13

## 2017-02-16 RX ADMIN — METOPROLOL SUCCINATE 12.5 MG: 25 TABLET, FILM COATED, EXTENDED RELEASE ORAL at 21:44

## 2017-02-16 RX ADMIN — ERTAPENEM SODIUM 1 G: 1 INJECTION, POWDER, LYOPHILIZED, FOR SOLUTION INTRAMUSCULAR; INTRAVENOUS at 09:01

## 2017-02-16 RX ADMIN — NYSTATIN: 100000 POWDER TOPICAL at 08:10

## 2017-02-16 RX ADMIN — Medication 250 MG: at 17:33

## 2017-02-16 RX ADMIN — OXYCODONE HYDROCHLORIDE AND ACETAMINOPHEN 1 TABLET: 5; 325 TABLET ORAL at 03:44

## 2017-02-16 RX ADMIN — Medication 10 ML: at 21:45

## 2017-02-16 RX ADMIN — OXYCODONE HYDROCHLORIDE AND ACETAMINOPHEN 1 TABLET: 5; 325 TABLET ORAL at 14:00

## 2017-02-16 RX ADMIN — NYSTATIN: 100000 POWDER TOPICAL at 21:44

## 2017-02-16 RX ADMIN — CLINDAMYCIN PHOSPHATE 900 MG: 18 INJECTION, SOLUTION INTRAVENOUS at 14:00

## 2017-02-16 RX ADMIN — PREDNISONE 10 MG: 10 TABLET ORAL at 08:12

## 2017-02-16 RX ADMIN — SODIUM CHLORIDE 100 ML/HR: 9 INJECTION, SOLUTION INTRAVENOUS at 16:37

## 2017-02-16 NOTE — PLAN OF CARE
Problem: Patient Care Overview (Adult)  Goal: Plan of Care Review  Outcome: Ongoing (interventions implemented as appropriate)    02/16/17 1244   Coping/Psychosocial Response Interventions   Plan Of Care Reviewed With patient;family   Outcome Evaluation   Outcome Summary/Follow up Plan CBSE completed. Pt had delayed cough 1x after thin, however exhibited coughing prior to PO. Recommend regular solids and thin liquids. Skilled SLP services not warranted at this time.

## 2017-02-16 NOTE — PROGRESS NOTES
Continued Stay Note  TE[facility]     Patient Name: Tracee Neal  MRN: [id number]  Today's Date: 2/16/2017    Admit Date: 2/14/2017          Discharge Plan       02/16/17 1450    Case Management/Social Work Plan    Plan [facility]    Additional Comments Richelle madrid [facility] has called SW to inform that patient has been accepted to [facility] pending dc of [facility]. Patient will have completed three night inpatient stay on 2/18.               Discharge Codes     None            ANGÉLICA Gao

## 2017-02-16 NOTE — PROGRESS NOTES
Acute Care - Occupational Therapy Initial Evaluation  [facility]     Patient Name: Tracee Neal  : 4/15/1932  MRN: [id number]  Today's Date: 2017  Onset of Illness/Injury or Date of Surgery Date: 17  Date of Referral to OT: 17  Referring Physician: Dr. Lee    Admit Date: 2017       ICD-10-CM ICD-9-CM   1. Generalized weakness R53.1 780.79   2. Thoracic compression fracture, closed, initial encounter S22.000A 805.2   3. Dysphagia, unspecified type R13.10 787.20   4. Impaired physical mobility Z74.09 781.99   5. Impaired mobility and ADLs Z74.09 799.89     Patient Active Problem List   Diagnosis   • Generalized weakness   • SIADH (syndrome of inappropriate ADH production)   • HTN (hypertension)   • Osteoarthritis   • Ulcerative colitis   • Pulmonary nodules   • Iron deficiency anemia   • Bacterial UTI   • Altered mental status     Past Medical History   Diagnosis Date   • Anxiety    • Arthritis    • Clostridium difficile infection    • Concussion    • COPD (chronic obstructive pulmonary disease)    • Fall    • History of transfusion    • Hypertension    • Inappropriate vasopressin secretion syndrome    • Irregular heart rhythm    • TIA (transient ischemic attack)    • Ulcerative colitis      Past Surgical History   Procedure Laterality Date   • Cholecystectomy     • Replacement total knee bilateral     • Eye surgery     • Hip trocanteric nailing with intramedullary hip screw Left 2/15/2017     Procedure: HIP TROCANTERIC NAILING SHORT WITH INTRAMEDULLARY HIP SCREW;  Surgeon: Pastor Lee MD;  Location: [facility];  Service:           OT ASSESSMENT FLOWSHEET (last 72 hours)      OT Evaluation       17 1005 17 1004 17 0945 17 0822 02/15/17 1700    Rehab Evaluation    Document Type evaluation  -LH evaluation   see MAR  -MM evaluation  -MB --  -MM     Subjective Information agree to therapy;complains of;weakness;fatigue;pain;nausea/vomiting;dyspnea  - agree to  therapy;complains of;weakness;fatigue;pain;nausea/vomiting;dyspnea  -MM agree to therapy;complains of;pain   in thigh  -MB      General Information    Patient Profile Review yes  - yes  -MM  --  -MM     Onset of Illness/Injury or Date of Surgery Date 02/14/17  -LH 02/14/17  -MM  --  -MM     Referring Physician Dr. Lee  - Dr. Lee  -  --  -MM     General Observations eyes closed, alexander's in bed, scd's in place, skin tear L lower leg, 2 small lacerations on forehead  - pt in fowlers, eyes closed but alert, 2L O2 per NC, 2 small lacerations to forehead  -MM       Pertinent History Of Current Problem admit w/ AMS, 5 falls last week, L hip pain, Dx:  L intertrochanteric hip fx, Sx:  short TFN L hip 2.15.17  - admitted with AMS and 5 falls during last week. 2/15 L intertrochanteric femur fx s/p cephalomedullary nailing L femur. 2/16 H&H 10.9 & 32.1. 2/14 mult. CT spine (- for acute changes), CT head: high L parietal scalp hematoma, XR chest: COPD, XR Shoulder (-), MRI brain (-), XR hip: acute complete closed, mildly displace L femur fx.  -MM  --  -MM     Precautions/Limitations fall precautions  - fall precautions  -MM       Prior Level of Function max assist:;ADL's  - max assist:;dressing;bathing;all household mobility;community mobility   per pt, she stayed in bed most of the time  -MM       Equipment Currently Used at Home --   rollator  - --   rollator  -   other (see comments)  -AA    Plans/Goals Discussed With patient;family;agreed upon  - patient and family;agreed upon  -MM       Risks Reviewed patient and family:;LOB;nausea/vomiting;dizziness;increased discomfort  - patient and family:;LOB;nausea/vomiting;dizziness;change in vital signs;increased discomfort;increased drainage;lines disloged  -MM       Benefits Reviewed patient and family:;improve function;increase independence;increase strength;increase balance  - patient and family:;improve function;increase independence;increase  strength;increase balance;decrease pain  -MM       Barriers to Rehab previous functional deficit  - physical barrier;previous functional deficit  -MM       Living Environment    Lives With facility resident   [facility]  - facility resident   Pt from [place], d/c plan [facility]  -MM  --  -MM     Living Arrangements assisted living  - assisted living  -MM  --  -MM     Home Accessibility no concerns   d/c to [place]  - no concerns  -MM       Living Environment Comment  Per Dr. Yin (pt's nephew), Pt has had steady decrease in overall function and motivation since fall. Pt was active at that time, but since has primarily stayed in bed and staff does most of ADLs for her.   -MM       Clinical Impression    Date of Referral to OT  02/16/17  -MM       OT Diagnosis  impaired mobility and adls  -MM       Functional Level At Time Of Evaluation  fair  -MM       Impairments Found (describe specific impairments)  arousal, attention, and cognition;ergonomics and body mechanics;gait, locomotion, and balance;motor function;muscle performance  -MM       Patient/Family Goals Statement  return home  -MM       Criteria for Skilled Therapeutic Interventions Met  yes;treatment indicated  -MM       Rehab Potential  good, to achieve stated therapy goals  -MM       Therapy Frequency  3-5 times/wk  -MM       Predicted Duration of Therapy Intervention (days/wks)  10 days/until d/c  -MM       Anticipated Equipment Needs At Discharge  --   Facility can meet AD/AE needs  -MM       Anticipated Discharge Disposition  skilled nursing facility  -MM       Pain Assessment    Pain Assessment 0-10  -LH 0-10  -MM       Pain Score 8  -LH 8  -MM       Pain Location Hip  -LH Hip  -MM       Pain Orientation Left  -LH Left;Right   both hips hurt  -MM       Pain Descriptors Aching;Cramping  -LH Aching  -MM       Pain Frequency Intermittent  -LH Constant/continuous  -MM       Pain Intervention(s) Medication (See  MAR);Repositioned;Ambulation/increased activity  -LH Medication (See MAR);Repositioned  -MM       Response to Interventions tolerated, unchanged  -LH tolerated  -MM       Vision Assessment/Intervention    Visual Impairment  WFL with corrective lenses   does not wear them  -MM       Cognitive Assessment/Intervention    Current Cognitive/Communication Assessment functional  -LH functional  -MM       Orientation Status oriented to;person;place;situation  -LH oriented to;person;place;situation  -MM       Follows Commands/Answers Questions  able to follow single-step instructions;75% of the time  -MM       Personal Safety WNL/WFL  -LH WNL/WFL  -MM       Personal Safety Interventions fall prevention program maintained  -LH fall prevention program maintained;supervised activity  -MM       ROM (Range of Motion)    General ROM Detail B shoulders AROM limited 75%, AAROM BLE WFL  -LH B shoulders AROM limited 75%, rest of BUE AROM WFL. See PT note for BLE  -MM       MMT (Manual Muscle Testing)    General MMT Assessment Detail functionally 3-/5  -LH BUE 4-/5, see PT note for BLE  -MM       Bed Mobility, Assessment/Treatment    Bed Mobility, Assistive Device head of bed elevated  -LH head of bed elevated  -MM       Bed Mobility, Scoot/Bridge, [place] maximum assist (25% patient effort)  -LH maximum assist (25% patient effort);2 person assist required  -MM       Bed Mob, Supine to Sit, [place] verbal cues required;moderate assist (50% patient effort);2 person assist required  - moderate assist (50% patient effort);2 person assist required;verbal cues required  -MM       Bed Mob, Sit to Supine, [place] verbal cues required;maximum assist (25% patient effort);2 person assist required  - maximum assist (25% patient effort);2 person assist required;verbal cues required  -MM       Bed Mobility, Safety Issues decreased use of arms for pushing/pulling;decreased use of legs for bridging/pushing  - decreased use of  arms for pushing/pulling;decreased use of legs for bridging/pushing  -MM       Bed Mobility, Impairments strength decreased;pain  -LH strength decreased;pain  -MM       Transfer Assessment/Treatment    Transfers, Sit-Stand [place] unable to perform;other (see comments)   due to patients c/o pain  -LH        Transfer, Comment  deferred d/t pt c/o pain  -MM       Lower Body Dressing Assessment/Training    LB Dressing Assess/Train, Clothing Type  doffing:;donning:;slipper socks  -MM       LB Dressing Assess/Train, Position  other (see comments)   fowlers  -MM       LB Dressing Assess/Train, [place]  maximum assist (25% patient effort)  -MM       LB Dressing Assess/Train, Impairments  pain  -MM       Grooming Assessment/Training    Grooming Assess/Train, Position  sitting;edge of bed  -MM       Grooming Assess/Train, Indepen Level  set up required;minimum assist (75% patient effort);verbal cues required  -MM       Grooming Assess/Train, Impairments  impaired balance;pain  -MM       Grooming Assess/Train, Comment  brushed teeth  -MM       Motor Skills/Interventions    Additional Documentation Balance Skills Training (Group)  -        Balance Skills Training    Sitting-Level of Assistance Contact guard  -        Sitting-Balance Support No upper extremity supported  -        Sitting-Balance Activities --   BLE therex  -        Therapy Exercises    Right Lower Extremity AROM:;5 reps  -LH        Left Lower Extremity AAROM:;PROM:;5 reps  -LH        Sensory Assessment/Intervention    Light Touch  LUE;RUE  -MM       LUE Light Touch  WNL  -MM       RUE Light Touch  WNL  -MM       General Therapy Interventions    Planned Therapy Interventions  ADL retraining;activity intolerance;adaptive equipment training;balance training;bed mobility training;energy conservation;fine motor coordination training;home exercise program;strengthening;transfer training;motor coordination training  -MM       Positioning and  Restraints    Pre-Treatment Position in bed  -LH in bed  -MM       Post Treatment Position bed  - bed  -MM       In Bed fowlers;call light within reach;encouraged to call for assist;side rails up x2;SCD pump applied;heels elevated  - fowlers;call light within reach;encouraged to call for assist;side rails up x2;heels elevated;SCD pump applied  -MM         02/15/17 1135 02/15/17 0845 02/15/17 0826 02/14/17 1557 02/14/17 1537    Rehab Evaluation    Evaluation Not Performed  --   Ortho planning sx today, PT will await post op orders.  -LATOSHA other (see comments)   Pt NPO for possible hip replacement.  -MB      General Information    Equipment Currently Used at Home     --   [facility]   -    Living Environment    Lives With facility resident  -   --   [facility]     Living Arrangements assisted living   [facility]   assisted living  -     Home Accessibility    no concerns  -     Stair Railings at Home    none  -     Type of Financial/Environmental Concern    none  -     Transportation Available ambulance  -   none;family or friend will provide  -     Living Environment Comment    [facility]   -     Functional Level Prior    Ambulation     1-->assistive equipment  -    Transferring     1-->assistive equipment  -    Toileting     1-->assistive equipment  -    Bathing     2-->assistive person  -    Dressing     2-->assistive person  -    Eating     0-->independent  -    Communication     0-->understands/communicates without difficulty  -    Swallowing     0-->swallows foods/liquids without difficulty  -      User Key  (r) = Recorded By, (t) = Taken By, (c) = Cosigned By    Initials Name Effective Dates    JALEEL Ward, PABLO-SLP 08/02/16 -      Grabiel Ignacio, PT 08/02/16 -     LATOSHA Nicole, PT DPT 08/02/16 -      Greta Saba, RN 08/02/16 -     BRANDO Villanueva, RN 08/02/16 -     KJ Ela Rasmussen, NAKIA 09/15/16 -     MM Demarco BRANTLEY  ANGEL Wolf/L 10/21/16 -            Occupational Therapy Education     Title: PT OT SLP Therapies (Active)     Topic: Occupational Therapy (Active)     Point: ADL training (Active)    Description: Instruct learner(s) on proper safety adaptation and remediation techniques during self care or transfers.   Instruct in proper use of assistive devices.    Learning Progress Summary    Learner Readiness Method Response Comment Documented by Status   Patient Acceptance E NR Pt and family educated on OT role, benefits and POC. Pt needs reinforcement,family verbalized understanding.  02/16/17 1217 Active   Family Acceptance E NR Pt and family educated on OT role, benefits and POC. Pt needs reinforcement,family verbalized understanding.  02/16/17 1217 Active                      User Key     Initials Effective Dates Name Provider Type Discipline     10/21/16 -  ANGEL Barnes/L Occupational Therapist OT                  OT Recommendation and Plan  Anticipated Equipment Needs At Discharge:  (Facility can meet AD/AE needs)  Anticipated Discharge Disposition: skilled nursing facility  Planned Therapy Interventions: ADL retraining, activity intolerance, adaptive equipment training, balance training, bed mobility training, energy conservation, fine motor coordination training, home exercise program, strengthening, transfer training, motor coordination training  Therapy Frequency: 3-5 times/wk  Plan of Care Review  Plan Of Care Reviewed With: patient, family  Progress: progress toward functional goals as expected  Outcome Summary/Follow up Plan: OT eval completed. Pt was mod-max A for bed mobility. Pt was set up with Min A for brushing teeth, sitting EOB. Pt demo'd decreased ADL, t/f, bed mobility, functional mobility, safety and strength. Skilled OT recommended to address these needs. Recommended d/c to SNF for additional therapy.          OT Goals       02/16/17 1218          Bed Mobility OT LTG    Bed Mobility OT  LTG, Date Established 02/16/17  -MM      Bed Mobility OT LTG, Time to Achieve by discharge  -MM      Bed Mobility OT LTG, Activity Type all bed mobility  -MM      Bed Mobility OT LTG, [place] Level minimum assist (75% patient effort)  -MM      Bed Mobility OT LTG, Assist Device bed rails  -MM      Transfer Training OT LTG    Transfer Training OT LTG, Date Established 02/16/17  -MM      Transfer Training OT LTG, Time to Achieve by discharge  -MM      Transfer Training OT LTG, Activity Type all transfers  -MM      Transfer Training OT LTG, [place] Level minimum assist (75% patient effort)  -MM      Transfer Training OT LTG, Assist Device walker, rolling  -MM      Grooming OT LTG    Grooming Goal OT LTG, Date Established 02/16/17  -MM      Grooming Goal OT LTG, Time to Achieve by discharge  -MM      Grooming Goal OT LTG, [place] Level independent  -MM      Grooming Goal OT LTG, Position sitting, edge of bed;sitting in chair  -MM      Toileting OT LTG    Toileting Goal OT LTG, Date Established 02/16/17  -MM      Toileting Goal OT LTG, Time to Achieve by discharge  -MM      Toileting Goal OT LTG, [place] Level minimum assist (75% patient effort)  -MM      Toileting Goal OT LTG, Assist Device toilet seat, raised  -MM        User Key  (r) = Recorded By, (t) = Taken By, (c) = Cosigned By    Initials Name Provider Type    TIFFANIE Wolf, OTR/L Occupational Therapist                Outcome Measures       02/16/17 1200 02/16/17 1116       How much help from another person do you currently need...    Turning from your back to your side while in flat bed without using bedrails?  2  -LH     Moving from lying on back to sitting on the side of a flat bed without bedrails?  2  -LH     Moving to and from a bed to a chair (including a wheelchair)?  1  -LH     Standing up from a chair using your arms (e.g., wheelchair, bedside chair)?  1  -LH     Climbing 3-5 steps with a railing?  1  -LH     To walk in  hospital room?  1  -LH     AM-PAC 6 Clicks Score  8  -LH     How much help from another is currently needed...    Putting on and taking off regular lower body clothing? 2  -MM      Bathing (including washing, rinsing, and drying) 2  -MM      Toileting (which includes using toilet bed pan or urinal) 2  -MM      Putting on and taking off regular upper body clothing 3  -MM      Taking care of personal grooming (such as brushing teeth) 3  -MM      Eating meals 3  -MM      Score 15  -MM      Functional Assessment    Outcome Measure Options AM-PAC 6 Clicks Daily Activity (OT)  -MM AM-PAC 6 Clicks Basic Mobility (PT)  -       User Key  (r) = Recorded By, (t) = Taken By, (c) = Cosigned By    Initials Name Provider Type     Grabiel Ignacio, PT Physical Therapist    MM Demarco Wolf OTR/L Occupational Therapist          Time Calculation:   OT Start Time: 1003  OT Stop Time: 1047 (additional 15 minutes for chart review)  OT Time Calculation (min): 44 min    Therapy Charges for Today     Code Description Service Date Service Provider Modifiers Qty    [id number]  OT SELFCARE CURRENT 2/16/2017 Demarco Wolf OTR/L GO, CK 1    [id number]  OT SELFCARE PROJECTED 2/16/2017 ANGEL Barnes/L GO, CJ 1    [id number]  OT EVAL LOW COMPLEXITY 4 2/16/2017 ANGEL Barnes/L GO 1          OT G-codes  OT Professional Judgement Used?: Yes  OT Functional Scales Options: AM-PAC 6 Clicks Daily Activity (OT)  Score: 15  Functional Limitation: Self care  Self Care Current Status (): At least 40 percent but less than 60 percent impaired, limited or restricted  Self Care Goal Status (): At least 20 percent but less than 40 percent impaired, limited or restricted    ANGEL Ramsay/SUJATHA  2/16/2017

## 2017-02-16 NOTE — PROGRESS NOTES
Acute Care - Physical Therapy Initial Evaluation  [facility]     Patient Name: Tracee Neal  : 4/15/1932  MRN: [id number]  Today's Date: 2017   Onset of Illness/Injury or Date of Surgery Date: 17  Date of Referral to PT: 02/15/17  Referring Physician: Dr. Lee      Admit Date: 2017     Visit Dx:    ICD-10-CM ICD-9-CM   1. Generalized weakness R53.1 780.79   2. Thoracic compression fracture, closed, initial encounter S22.000A 805.2   3. Dysphagia, unspecified type R13.10 787.20   4. Impaired physical mobility Z74.09 781.99     Patient Active Problem List   Diagnosis   • Generalized weakness   • SIADH (syndrome of inappropriate ADH production)   • HTN (hypertension)   • Osteoarthritis   • Ulcerative colitis   • Pulmonary nodules   • Iron deficiency anemia   • Bacterial UTI   • Altered mental status     Past Medical History   Diagnosis Date   • Anxiety    • Arthritis    • Clostridium difficile infection    • Concussion    • COPD (chronic obstructive pulmonary disease)    • Fall    • History of transfusion    • Hypertension    • Inappropriate vasopressin secretion syndrome    • Irregular heart rhythm    • TIA (transient ischemic attack)    • Ulcerative colitis      Past Surgical History   Procedure Laterality Date   • Cholecystectomy     • Replacement total knee bilateral     • Eye surgery     • Hip trocanteric nailing with intramedullary hip screw Left 2/15/2017     Procedure: HIP TROCANTERIC NAILING SHORT WITH INTRAMEDULLARY HIP SCREW;  Surgeon: Pastor Lee MD;  Location: [facility];  Service:           PT ASSESSMENT (last 72 hours)      PT Evaluation       17 1005 17 1004    Rehab Evaluation    Document Type evaluation  -LH evaluation   see MAR  -MM    Subjective Information agree to therapy;complains of;weakness;fatigue;pain;nausea/vomiting;dyspnea  - agree to therapy;complains of;weakness;fatigue;pain;nausea/vomiting;dyspnea  -MM    General Information    Patient Profile Review yes   - yes  -MM    Onset of Illness/Injury or Date of Surgery Date 02/14/17  - 02/14/17  -MM    Referring Physician Dr. Lee  - Dr. Lee  -    General Observations eyes closed, alexander's in bed, scd's in place, skin tear L lower leg, 2 small lacerations on forehead  - pt in fowlers, eyes closed but alert, 2L O2 per NC, 2 small lacerations to forehead  -MM    Pertinent History Of Current Problem admit w/ AMS, 5 falls last week, L hip pain, Dx:  L intertrochanteric hip fx, Sx:  short TFN L hip 2.15.17  - admitted with AMS and 5 falls during last week. 2/15 L intertrochanteric femur fx s/p cephalomedullary nailing L femur. 2/16 H&H 10.9 & 32.1. 2/14 mult. CT spine (- for acute changes), CT head: high L parietal scalp hematoma, XR chest: COPD, XR Shoulder (-), MRI brain (-), XR hip: acute complete closed, mildly displace L femur fx.  -MM    Precautions/Limitations fall precautions  - fall precautions  -    Prior Level of Function max assist:;ADL's  - max assist:;dressing;bathing   per pt, she stayed in bed most of the time  -    Equipment Currently Used at Home --   rollator  - --   rollator  -    Plans/Goals Discussed With patient;family;agreed upon  - patient and family;agreed upon  -    Risks Reviewed patient and family:;LOB;nausea/vomiting;dizziness;increased discomfort  - patient and family:;LOB;nausea/vomiting;dizziness;change in vital signs;increased discomfort;increased drainage;lines disloged  -    Benefits Reviewed patient and family:;improve function;increase independence;increase strength;increase balance  - patient and family:;improve function;increase independence;increase strength;increase balance;decrease pain  -    Barriers to Rehab previous functional deficit  - physical barrier;previous functional deficit  -    Living Environment    Lives With facility resident   [facility]  -     Living Arrangements assisted living  -     Home Accessibility no concerns    d/c to [facility]  -     Clinical Impression    Date of Referral to PT 02/15/17  -     PT Diagnosis impaired physical mobility  -     Patient/Family Goals Statement decrease pain, increase activity  -     Criteria for Skilled Therapeutic Interventions Met yes;treatment indicated  -     Rehab Potential fair, will monitor progress closely  -     Predicted Duration of Therapy Intervention (days/wks) 10 days  -     Pain Assessment    Pain Assessment 0-10  - 0-10  -MM    Pain Score 8  - 8  -MM    Pain Location Hip  - Hip  -MM    Pain Orientation Left  - Left;Right   both hips hurt  -MM    Pain Descriptors Aching;Cramping  - Aching  -MM    Pain Frequency Intermittent  - Constant/continuous  -MM    Pain Intervention(s) Medication (See MAR);Repositioned;Ambulation/increased activity  - Medication (See MAR);Repositioned  -MM    Response to Interventions tolerated, unchanged  - tolerated  -MM    Vision Assessment/Intervention    Visual Impairment  [facility] with corrective lenses   does not wear them  -    Cognitive Assessment/Intervention    Current Cognitive/Communication Assessment functional  -     Orientation Status oriented to;person;place;situation  -     Personal Safety WNL/WFL  -     Personal Safety Interventions fall prevention program maintained  -     ROM (Range of Motion)    General ROM Detail B shoulders AROM limited 75%, AAROM BLE WFL  -     MMT (Manual Muscle Testing)    General MMT Assessment Detail functionally 3-/5  -     Bed Mobility, Assessment/Treatment    Bed Mobility, Assistive Device head of bed elevated  -     Bed Mobility, Scoot/Bridge, [place] maximum assist (25% patient effort)  -     Bed Mob, Supine to Sit, [place] verbal cues required;moderate assist (50% patient effort);2 person assist required  -     Bed Mob, Sit to Supine, [place] verbal cues required;maximum assist (25% patient effort);2 person assist required  -     Bed Mobility,  Safety Issues decreased use of arms for pushing/pulling;decreased use of legs for bridging/pushing  -     Bed Mobility, Impairments strength decreased;pain  -LH     Transfer Assessment/Treatment    Transfers, Sit-Stand [place] unable to perform;other (see comments)   due to patients c/o pain  -     Motor Skills/Interventions    Additional Documentation Balance Skills Training (Group)  -     Balance Skills Training    Sitting-Level of Assistance Contact guard  -     Sitting-Balance Support No upper extremity supported  -     Sitting-Balance Activities --   BLE therex  -     Therapy Exercises    Right Lower Extremity AROM:;5 reps  -LH     Left Lower Extremity AAROM:;PROM:;5 reps  -LH     Positioning and Restraints    Pre-Treatment Position in bed  -     Post Treatment Position bed  -     In Bed fowlers;call light within reach;encouraged to call for assist;side rails up x2;SCD pump applied;heels elevated  -       02/16/17 0945 02/16/17 0822    Rehab Evaluation    Document Type evaluation  -MB --  -MM    Subjective Information agree to therapy;complains of;pain   in thigh  -MB     General Information    Patient Profile Review  --  -MM    Onset of Illness/Injury or Date of Surgery Date  --  -MM    Referring Physician  --  -MM    Pertinent History Of Current Problem  --  -MM    Living Environment    Lives With  facility resident   Pt from [facility] d/c [facility]  -    Living Arrangements  assisted living  -      02/15/17 1700 02/15/17 1135    General Information    Equipment Currently Used at Home other (see comments)  -AA     Living Environment    Lives With  facility resident  -    Living Arrangements  assisted living   [facility]    Transportation Available  ambulance  -      02/15/17 0845 02/15/17 0826    Rehab Evaluation    Evaluation Not Performed --   Ortho planning sx today, PT will await post op orders.  -LATOSHA other (see comments)   Pt NPO for possible hip replacement.   -MB      02/14/17 1557 02/14/17 1537    General Information    Equipment Currently Used at Home  --   [facility]   -    Living Environment    Lives With --   [place]   -     Living Arrangements assisted living  -     Home Accessibility no concerns  -     Stair Railings at Home none  -     Type of Financial/Environmental Concern none  -     Transportation Available none;family or friend will provide  -     Living Environment Comment [facility]   -       User Key  (r) = Recorded By, (t) = Taken By, (c) = Cosigned By    Initials Name Provider Type    JALEEL Ward, CCC-SLP Speech and Language Pathologist     Grabiel Ignacio, PT Physical Therapist    LATOSHA Nicole, PT DPT Physical Therapist    ALLISON Saba, RN Registered Nurse    BRANDO Villanueva, RN Registered Nurse    AMADO Rasmussen, [facility]     MM Demarco Wolf, OTR/L Occupational Therapist          Physical Therapy Education     Title: PT OT SLP Therapies (Active)     Topic: Physical Therapy (Active)     Point: Mobility training (Active)    Learning Progress Summary    Learner Readiness Method Response Comment Documented by Status   Patient Acceptance E,D NR   02/16/17 1119 Active                      User Key     Initials Effective Dates Name Provider Type Discipline     08/02/16 -  Grabiel Ignacio, PT Physical Therapist PT                PT Recommendation and Plan  Anticipated Equipment Needs At Discharge: bedside commode, front wheeled walker  Anticipated Discharge Disposition: skilled nursing facility  Planned Therapy Interventions: bed mobility training, home exercise program, patient/family education, strengthening, transfer training  PT Frequency: daily, per priority policy  Plan of Care Review  Plan Of Care Reviewed With: patient  Outcome Summary/Follow up Plan: Pt. only tolerated sitting EOB due L hip pain.  Did complete and ADL and some BLE therex sitting EOB.  Pt. presents  w/ several activity limitations, for example, mobility, self care, and community involvement.  Patients pain tolerance affects her clinical presentation.  Pt. to receive skilled PT to address her functional limitations and strength impairment.  Recommend SNF at D/C.  Thank you for referral.          IP PT Goals       02/16/17 1121          Bed Mobility PT LTG    Bed Mobility PT LTG, Date Established 02/16/17  -      Bed Mobility PT LTG, Time to Achieve by discharge  -      Bed Mobility PT LTG, Activity Type all bed mobility  -      Bed Mobility PT LTG, [place] Level minimum assist (75% patient effort)  -      Transfer Training PT LTG    Transfer Training PT LTG, Date Established 02/16/17  -      Transfer Training PT LTG, Time to Achieve by discharge  -      Transfer Training PT LTG, Activity Type bed to chair /chair to bed;sit to stand/stand to sit  -      Transfer Training PT LTG, [place] Level moderate assist (50% patient effort)  -      Transfer Training PT LTG, Assist Device walker, rolling  -      Transfer Training PT LTG, Additional Goal RW for bed<->chair t/f  -        User Key  (r) = Recorded By, (t) = Taken By, (c) = Cosigned By    Initials Name Provider Type     Grabiel Ignacio, PT Physical Therapist                Outcome Measures       02/16/17 1116          How much help from another person do you currently need...    Turning from your back to your side while in flat bed without using bedrails? 2  -LH      Moving from lying on back to sitting on the side of a flat bed without bedrails? 2  -LH      Moving to and from a bed to a chair (including a wheelchair)? 1  -LH      Standing up from a chair using your arms (e.g., wheelchair, bedside chair)? 1  -LH      Climbing 3-5 steps with a railing? 1  -LH      To walk in hospital room? 1  -      AM-PAC 6 Clicks Score 8  -      Functional Assessment    Outcome Measure Options AM-PAC 6 Clicks Basic Mobility (PT)  -        User  Key  (r) = Recorded By, (t) = Taken By, (c) = Cosigned By    Initials Name Provider Type     Grabiel Ignacio PT Physical Therapist           Time Calculation:         PT Charges       02/16/17 1117          Time Calculation    Start Time 1005  -      Stop Time 1050  -      Time Calculation (min) 45 min  -      PT Received On 02/16/17  -      PT Goal Re-Cert Due Date 02/26/17  -        User Key  (r) = Recorded By, (t) = Taken By, (c) = Cosigned By    Initials Name Provider Type     Grabiel Ignacio PT Physical Therapist          Therapy Charges for Today     Code Description Service Date Service Provider Modifiers Qty    45341294677 HC PT MOBILITY CURRENT 2/16/2017 Grabiel Ignacio, PT GP, CM 1    89522075509 HC PT MOBILITY PROJECTED 2/16/2017 Grabiel Ignacio, PT GP, CK 1    35014878299 HC PT EVAL MOD COMPLEXITY 3 2/16/2017 Grabiel Ignacio, PT GP 1          PT G-Codes  Outcome Measure Options: AM-PAC 6 Clicks Basic Mobility (PT)  Score: 8  Functional Limitation: Mobility: Walking and moving around  Mobility: Walking and Moving Around Current Status (): At least 80 percent but less than 100 percent impaired, limited or restricted  Mobility: Walking and Moving Around Goal Status (): At least 40 percent but less than 60 percent impaired, limited or restricted      Grabiel Ignacio, PT  2/16/2017

## 2017-02-16 NOTE — PLAN OF CARE
Problem: Patient Care Overview (Adult)  Goal: Plan of Care Review  Outcome: Ongoing (interventions implemented as appropriate)    02/16/17 1522   Coping/Psychosocial Response Interventions   Plan Of Care Reviewed With patient   Patient Care Overview   Progress progress toward functional goals as expected   Outcome Evaluation   Outcome Summary/Follow up Plan pain meds given with relief. turn pt every 2 hrs. drgs to left hips in dry and intact        Goal: Adult Individualization and Mutuality  Outcome: Ongoing (interventions implemented as appropriate)  Goal: Discharge Needs Assessment  Outcome: Ongoing (interventions implemented as appropriate)    Problem: Confusion, Acute (Adult)  Goal: Cognitive/Functional Impairments Minimized  Outcome: Ongoing (interventions implemented as appropriate)  Goal: Safety  Outcome: Ongoing (interventions implemented as appropriate)    Problem: Fractured Hip (Adult)  Goal: Signs and Symptoms of Listed Potential Problems Will be Absent or Manageable (Fractured Hip)  Outcome: Ongoing (interventions implemented as appropriate)    Problem: Perioperative Period (Adult)  Goal: Signs and Symptoms of Listed Potential Problems Will be Absent or Manageable (Perioperative Period)  Outcome: Ongoing (interventions implemented as appropriate)    Problem: Pressure Ulcer Risk (Lowell Scale) (Adult,Obstetrics,Pediatric)  Goal: Identify Related Risk Factors and Signs and Symptoms  Outcome: Ongoing (interventions implemented as appropriate)  Goal: Skin Integrity  Outcome: Ongoing (interventions implemented as appropriate)

## 2017-02-16 NOTE — PROGRESS NOTES
Orthopedic Surgery Progress Note    Tracee Neal  2/16/2017    Subjective:     Systemic or Specific Complaints: No Complaints    Pain regular    Objective:     Patient Vitals for the past 24 hrs:   BP Temp Temp src Pulse Resp SpO2   02/16/17 0748 120/75 97.7 °F (36.5 °C) Oral 83 16 100 %   02/16/17 0400 172/97 97.8 °F (36.6 °C) Oral 99 20 99 %   02/15/17 2341 139/89 96.9 °F (36.1 °C) Axillary 91 18 99 %   02/15/17 1929 153/83 97 °F (36.1 °C) Axillary 93 - 96 %   02/15/17 1815 - 96.2 °F (35.7 °C) Axillary 85 18 100 %   02/15/17 1743 156/67 96.1 °F (35.6 °C) Axillary 85 18 93 %   02/15/17 1730 141/78 - - 84 14 98 %   02/15/17 1715 143/78 98 °F (36.7 °C) Temporal Art 86 15 98 %   02/15/17 1710 139/70 - - 88 10 100 %   02/15/17 1705 145/83 - - 87 12 100 %   02/15/17 1700 148/78 - - 88 12 100 %   02/15/17 1655 127/75 97.6 °F (36.4 °C) Temporal Art 92 12 100 %   02/15/17 1137 149/82 98.7 °F (37.1 °C) Tympanic 90 18 94 %         General: alert, appears stated age and cooperative   Wound: clean, dry, intact              Dressing: intact   Extremity: Distal NVI           DVT Exam: No evidence of DVT seen on physical exam.                   Data Review:    Results from last 7 days  Lab Units 02/16/17  0558   WBC 10*3/mm3 14.87*   HEMOGLOBIN g/dL 10.9*   HEMATOCRIT % 32.1*   PLATELETS 10*3/mm3 153       Results from last 7 days  Lab Units 02/16/17  0558   SODIUM mmol/L 131*   POTASSIUM mmol/L 4.3   CHLORIDE mmol/L 101   TOTAL CO2 mmol/L 23.0*   BUN mg/dL 17   CREATININE mg/dL 0.57   GLUCOSE mg/dL 106*   CALCIUM mg/dL 8.7       Assessment:     POD# 1     Plan:      1:  DVT prophylaxis, ICE, elevate  2:  Pain control  3:  Physical therapy/Occupation therapy  4:  Anticipate discharge on Saturday to [facility] if pain well controlled  5: WBAT    JASWINDER Gibbs

## 2017-02-16 NOTE — PLAN OF CARE
Problem: Patient Care Overview (Adult)  Goal: Plan of Care Review  Outcome: Ongoing (interventions implemented as appropriate)    02/15/17 3670   Coping/Psychosocial Response Interventions   Plan Of Care Reviewed With patient   Patient Care Overview   Progress no change   Outcome Evaluation   Outcome Summary/Follow up Plan pt had left hip surgery. mepilex in place. pt is confused.        Goal: Adult Individualization and Mutuality  Outcome: Ongoing (interventions implemented as appropriate)  Goal: Discharge Needs Assessment  Outcome: Ongoing (interventions implemented as appropriate)    Problem: Confusion, Acute (Adult)  Goal: Cognitive/Functional Impairments Minimized  Outcome: Ongoing (interventions implemented as appropriate)  Goal: Safety  Outcome: Ongoing (interventions implemented as appropriate)    Problem: Fractured Hip (Adult)  Goal: Signs and Symptoms of Listed Potential Problems Will be Absent or Manageable (Fractured Hip)  Outcome: Ongoing (interventions implemented as appropriate)    Problem: Perioperative Period (Adult)  Goal: Signs and Symptoms of Listed Potential Problems Will be Absent or Manageable (Perioperative Period)  Outcome: Ongoing (interventions implemented as appropriate)

## 2017-02-16 NOTE — PLAN OF CARE
Problem: Patient Care Overview (Adult)  Goal: Plan of Care Review  Outcome: Ongoing (interventions implemented as appropriate)    02/16/17 1928   Coping/Psychosocial Response Interventions   Plan Of Care Reviewed With patient   Patient Care Overview   Progress progress towards functional goals is fair   Outcome Evaluation   Outcome Summary/Follow up Plan Slept most of night, denying pain till this morning. Does not like to be repositioned. Medicated this morning with po pain med as per order. Effective towards relief. Dressing to left hip x2, clean/dry and intact. ppp. Neuro wnl. Noted Stage 2 pressure ulcer on left and right buttock. Mepilex x2 applied. Patient repositioned every 2 hours. Patient is confused. Safety maintained.       Goal: Adult Individualization and Mutuality  Outcome: Ongoing (interventions implemented as appropriate)  Goal: Discharge Needs Assessment  Outcome: Ongoing (interventions implemented as appropriate)    Problem: Confusion, Acute (Adult)  Goal: Cognitive/Functional Impairments Minimized  Outcome: Ongoing (interventions implemented as appropriate)  Goal: Safety  Outcome: Ongoing (interventions implemented as appropriate)    Problem: Fractured Hip (Adult)  Goal: Signs and Symptoms of Listed Potential Problems Will be Absent or Manageable (Fractured Hip)  Outcome: Ongoing (interventions implemented as appropriate)    Problem: Perioperative Period (Adult)  Goal: Signs and Symptoms of Listed Potential Problems Will be Absent or Manageable (Perioperative Period)  Outcome: Ongoing (interventions implemented as appropriate)    Problem: Pressure Ulcer Risk (Lowell Scale) (Adult,Obstetrics,Pediatric)  Goal: Identify Related Risk Factors and Signs and Symptoms  Outcome: Ongoing (interventions implemented as appropriate)  Goal: Skin Integrity  Outcome: Ongoing (interventions implemented as appropriate)

## 2017-02-16 NOTE — PLAN OF CARE
Problem: Patient Care Overview (Adult)  Goal: Plan of Care Review  Outcome: Ongoing (interventions implemented as appropriate)    02/16/17 1218   Coping/Psychosocial Response Interventions   Plan Of Care Reviewed With patient;family   Patient Care Overview   Progress progress toward functional goals as expected   Outcome Evaluation   Outcome Summary/Follow up Plan OT araceli completed. Pt was mod-max A for bed mobility. Pt was set up with Min A for brushing teeth, sitting EOB. Pt demo'd decreased ADL, t/f, bed mobility, functional mobility, safety and strength. Skilled OT recommended to address these needs. Recommended d/c to SNF for additional therapy.         Problem: Inpatient Occupational Therapy  Goal: Bed Mobility Goal LTG- OT  Outcome: Ongoing (interventions implemented as appropriate)    02/16/17 1218   Bed Mobility OT LTG   Bed Mobility OT LTG, Date Established 02/16/17   Bed Mobility OT LTG, Time to Achieve by discharge   Bed Mobility OT LTG, Activity Type all bed mobility   Bed Mobility OT LTG, [place] Level minimum assist (75% patient effort)   Bed Mobility OT LTG, Assist Device bed rails       Goal: Transfer Training Goal 1 LTG- OT  Outcome: Ongoing (interventions implemented as appropriate)    02/16/17 1218   Transfer Training OT LTG   Transfer Training OT LTG, Date Established 02/16/17   Transfer Training OT LTG, Time to Achieve by discharge   Transfer Training OT LTG, Activity Type all transfers   Transfer Training OT LTG, [place] Level minimum assist (75% patient effort)   Transfer Training OT LTG, Assist Device walker, rolling       Goal: Grooming Goal LTG- OT  Outcome: Ongoing (interventions implemented as appropriate)    02/16/17 1218   Grooming OT LTG   Grooming Goal OT LTG, Date Established 02/16/17   Grooming Goal OT LTG, Time to Achieve by discharge   Grooming Goal OT LTG, [place] Level independent   Grooming Goal OT LTG, Position sitting, edge of bed;sitting in chair       Goal:  Toileting Goal LTG- OT  Outcome: Ongoing (interventions implemented as appropriate)    02/16/17 1218   Toileting OT LTG   Toileting Goal OT LTG, Date Established 02/16/17   Toileting Goal OT LTG, Time to Achieve by discharge   Toileting Goal OT LTG, [place] Level minimum assist (75% patient effort)   Toileting Goal OT LTG, Assist Device toilet seat, raised

## 2017-02-16 NOTE — PLAN OF CARE
Problem: Patient Care Overview (Adult)  Goal: Plan of Care Review  Outcome: Ongoing (interventions implemented as appropriate)    02/16/17 1121   Coping/Psychosocial Response Interventions   Plan Of Care Reviewed With patient   Outcome Evaluation   Outcome Summary/Follow up Plan Pt. only tolerated sitting EOB due L hip pain. Did complete and ADL and some BLE therex sitting EOB. Pt. presents w/ several activity limitations, for example, mobility, self care, and community involvement. Patients pain tolerance also affects her clinical presentation. Pt. to receive skilled PT to address her functional limitations and strength impairments. Recommend SNF at D/C. Thank you for referral.         Problem: Inpatient Physical Therapy  Goal: Bed Mobility Goal LTG- PT  Outcome: Ongoing (interventions implemented as appropriate)    02/16/17 1121   Bed Mobility PT LTG   Bed Mobility PT LTG, Date Established 02/16/17   Bed Mobility PT LTG, Time to Achieve by discharge   Bed Mobility PT LTG, Activity Type all bed mobility   Bed Mobility PT LTG, [place] Level minimum assist (75% patient effort)       Goal: Transfer Training Goal 1 LTG- PT  Outcome: Ongoing (interventions implemented as appropriate)

## 2017-02-16 NOTE — PLAN OF CARE
Problem: Patient Care Overview (Adult)  Goal: Plan of Care Review  Outcome: Ongoing (interventions implemented as appropriate)    02/16/17 1411   Coping/Psychosocial Response Interventions   Plan Of Care Reviewed With patient   Patient Care Overview   Progress progress toward functional goals as expected   Outcome Evaluation   Outcome Summary/Follow up Plan Pt rates pain with therapy at 6/10. Pt required max assist for bed mobility and min-mod assist for transfers. Pt stood x2 and attempted Weight shifting, not ready to ambulate. PTA assisted pt with ROM on LLE. Pt will continue to benefit from skilled PT for overall mobility and strengthening.

## 2017-02-16 NOTE — PROGRESS NOTES
"    Chief complaint:   Chief Complaint   Patient presents with   • Fall   • Altered Mental Status        Subjective     No events      Objective      Vital Signs  Visit Vitals   • BP (P) 128/80 (BP Location: Left arm, Patient Position: Lying)   • Pulse (P) 85   • Temp (P) 97.4 °F (36.3 °C) (Oral)   • Resp (P) 16   • Ht 59\" (149.9 cm)   • Wt 150 lb 14.4 oz (68.4 kg)   • SpO2 (P) 100%   • BMI 30.48 kg/m2       Physical Exam    NAD  AAox3  CRANDALL      Assessment/Plan:     Hip frx s/p ORIF, placement    I discussed the patients findings and my recommendations with patient  Patricio Neal MD  02/16/17  1:06 PM          "

## 2017-02-16 NOTE — PLAN OF CARE
Problem: Patient Care Overview (Adult)  Goal: Plan of Care Review  Outcome: Ongoing (interventions implemented as appropriate)    02/16/17 1143   Coping/Psychosocial Response Interventions   Plan Of Care Reviewed With patient   Patient Care Overview   Progress no change   Outcome Evaluation   Outcome Summary/Follow up Plan Initial nutrition assessment; Pt reports fair appetite, has an intake of 50% of 1 meal. Interviewed for preferences, encouraged intake, and advised of available snack. Pt agreed to Ensure BID, encouraged pt to eat protein source first. Will continue to follow for nutrition needs.

## 2017-02-16 NOTE — PROGRESS NOTES
"    [facility] Medicine Services  INPATIENT PROGRESS NOTE    Length of Stay: 1  Date of Admission: 2/14/2017  Primary Care Physician: Pascual Patten MD    Subjective   Chief Complaint: left hip pain  HPI   Pt just finished with Physical therapy and was able to stand X2. Pt states her hip hurts. Nephew came in room and she states, \"I'm ready to watch Jeopardy.\"  Denies any abdominal pain. No nausea/vomiting. On oxygen at 2l/NC.     Review of Systems   All pertinent negatives and positives are as above. All other systems have been reviewed and are negative unless otherwise stated.     Objective    Temp:  [96.1 °F (35.6 °C)-98 °F (36.7 °C)] 97.4 °F (36.3 °C)  Heart Rate:  [83-99] 85  Resp:  [10-20] 16  BP: (120-172)/(67-97) 128/80  Physical Exam   Constitutional: She appears well-developed and well-nourished.   HENT:   Head: Normocephalic and atraumatic.   Eyes: Conjunctivae and EOM are normal. Pupils are equal, round, and reactive to light.   Neck: Neck supple. No JVD present. No thyromegaly present.   Cardiovascular: Normal rate, regular rhythm, normal heart sounds and intact distal pulses.  Exam reveals no gallop and no friction rub.    No murmur heard.  Pulmonary/Chest: Effort normal and breath sounds normal. No respiratory distress. She has no wheezes. She has no rales. She exhibits no tenderness.   Abdominal: Soft. Bowel sounds are normal. She exhibits no distension. There is no tenderness. There is no rebound and no guarding.   Musculoskeletal: Normal range of motion. She exhibits no edema, tenderness or deformity.   Lymphadenopathy:     She has no cervical adenopathy.   Neurological: She is alert. She displays normal reflexes. No cranial nerve deficit. She exhibits normal muscle tone.   Skin: Skin is warm and dry. No rash noted.   Skin tear left anterior shin. Steri strips noted. Mepilex to the lower section of anterior shin.    Psychiatric: She has a normal mood and " affect. Her behavior is normal. Judgment and thought content normal.     Results Review:  I have reviewed the labs, radiology results, and diagnostic studies.    Laboratory Data:     Results from last 7 days  Lab Units 02/16/17  0558 02/15/17  0447 02/14/17  1248   WBC 10*3/mm3 14.87* 15.76* 15.33*   HEMOGLOBIN g/dL 10.9* 12.5 14.0   HEMATOCRIT % 32.1* 36.7* 41.1   PLATELETS 10*3/mm3 153 206 267          Results from last 7 days  Lab Units 02/16/17  0558 02/15/17  0447 02/14/17  1248   SODIUM mmol/L 131* 130* 129*   POTASSIUM mmol/L 4.3 4.6 4.7   CHLORIDE mmol/L 101 100 96*   TOTAL CO2 mmol/L 23.0* 23.0* 23.0*   BUN mg/dL 17 21 22*   CREATININE mg/dL 0.57 0.59 0.77   CALCIUM mg/dL 8.7 8.9 9.6   BILIRUBIN mg/dL  --   --  1.3*   ALK PHOS U/L  --   --  116   ALT (SGPT) U/L  --   --  39   AST (SGOT) U/L  --   --  28   GLUCOSE mg/dL 106* 105* 163*       Culture Data:   BLOOD CULTURE   Date Value Ref Range Status   02/14/2017 No growth at 24 hours  Preliminary     URINE CULTURE   Date Value Ref Range Status   02/14/2017 >100,000 CFU/mL Escherichia coli (A)  Final       Radiology Data:   Imaging Results (last 24 hours)     Procedure Component Value Units Date/Time    XR Hip 1 View Without Pelvis Left (Surgery Only) [[id number]] Collected:  02/15/17 1647     Updated:  02/15/17 1652    Narrative:       INTRAOPERATIVE FLUOROSCOPIC GUIDANCE 02/15/2017      INDICATION: Intraoperative fluoroscopic guidance. Left femur  intramedullary burton placement.      TECHNIQUE: 4 fluoroscopic images from the operating room were submitted  for evaluation. Please note, no radiologist was in attendance for  acquisition of these images. These images are available for future  reference to the attending surgeon. Total fluoroscopic time was 0.4  minutes.      FINDINGS: Left femur intramedullary burton replacement.        Impression:       1. Intraoperative fluoroscopic guidance as described.   2. Please refer to real-time fluoroscopy and operative report  for full  details.  This report was finalized on 02/15/2017 16:50 by Dr. Perlita Fountain MD.    FL C Arm During Surgery [[id number]] Collected:  02/15/17 1647     Updated:  02/15/17 1652    Narrative:       INTRAOPERATIVE FLUOROSCOPIC GUIDANCE 02/15/2017      INDICATION: Intraoperative fluoroscopic guidance. Left femur  intramedullary burton placement.      TECHNIQUE: 4 fluoroscopic images from the operating room were submitted  for evaluation. Please note, no radiologist was in attendance for  acquisition of these images. These images are available for future  reference to the attending surgeon. Total fluoroscopic time was 0.4  minutes.      FINDINGS: Left femur intramedullary burton replacement.        Impression:       1. Intraoperative fluoroscopic guidance as described.   2. Please refer to real-time fluoroscopy and operative report for full  details.  This report was finalized on 02/15/2017 16:50 by Dr. Perlita Fountain MD.          I have reviewed the patient current medications.     Assessment/Plan     Hospital Problem List     Generalized weakness    Altered mental status      Assessment:  1. Recurrent falls.  2. Altered mental status, likely secondary to recurrent UTI.  3. Acute, mildly displaced left intertrochanteric hip fracture. POD #1 left short TFN  4. Subacute or chronic right sacral ala insufficiency fracture.  5. E. coli UTI.-pan sensitive  6. Hyponatremia, chronic, SIADH.   7. History of ulcerative colitis-chronic prednisone.  8. History of Clostridium difficile colitis.  9. Hyperlipidemia.  10. Interstitial opacities, pulmonary edema versus interstitial pneumonitis, asymptomatic.  11.  Hypertension.   12.  Poor pain control    Plan:  1. Change ertapenum to macrobid.   2. Will start probiotics given c.diff in the past.   3. Repeat Labs in am.   4. Disposition will be [facility] nursing home.     Discharge Planning: I expect patient to be discharged to SNF in 1-2 days.    Romina Flaherty, APRN    02/16/17   2:53 PM     I personally evaluated and examined the patient in conjunction with OLMAN Sanches and agree with the assessment, treatment plan, and disposition of the patient as recorded by her. My history, exam, and further recommendations are:     I saw the patient with her niece, Loulou Yin this afternoon.  The patient has had some difficulty with pain today, however, this is been effectively treated with [place] as needed.  She worked with physical therapy today.  The patient and her family wish for her to go to [facility] nursing [facility] at discharge.  She has been accepted there.  She will complete her 3 night inpatient a on February 18.  She would be ready for discharge on Saturday.    Her Escherichia coli UTI is found to be pan susceptible.  I discontinued her Invanz this afternoon.  I started her on Macrobid.  She has tolerated this medication the past without difficulty.  Dr. Lee also had to give her some postoperative clindamycin.  The patient has had history of Clostridium difficile colitis in the past.  I placed her on a probiotic (Florastor) today.    She otherwise has had slow daily improvement.  Loulou says her mental status is better today.  I find her mental status to be better as well.    She is now on Xarelto postoperatively for postop DVT prophylaxis.    Discontinue all IV fluid orders.    Yusef Hay,   02/16/17  4:57 PM

## 2017-02-17 LAB
ANION GAP SERPL CALCULATED.3IONS-SCNC: 6 MMOL/L (ref 4–13)
BUN BLD-MCNC: 21 MG/DL (ref 5–21)
BUN/CREAT SERPL: 35.6 (ref 7–25)
CALCIUM SPEC-SCNC: 8.9 MG/DL (ref 8.4–10.4)
CHLORIDE SERPL-SCNC: 101 MMOL/L (ref 98–110)
CO2 SERPL-SCNC: 24 MMOL/L (ref 24–31)
CREAT BLD-MCNC: 0.59 MG/DL (ref 0.5–1.4)
DEPRECATED RDW RBC AUTO: 48.2 FL (ref 40–54)
ERYTHROCYTE [DISTWIDTH] IN BLOOD BY AUTOMATED COUNT: 14.1 % (ref 12–15)
GFR SERPL CREATININE-BSD FRML MDRD: 97 ML/MIN/1.73
GLUCOSE BLD-MCNC: 77 MG/DL (ref 70–100)
HCT VFR BLD AUTO: 31.3 % (ref 37–47)
HGB BLD-MCNC: 10.6 G/DL (ref 12–16)
MCH RBC QN AUTO: 31.5 PG (ref 28–32)
MCHC RBC AUTO-ENTMCNC: 33.9 G/DL (ref 33–36)
MCV RBC AUTO: 93.2 FL (ref 82–98)
PLATELET # BLD AUTO: 162 10*3/MM3 (ref 130–400)
PMV BLD AUTO: 11.1 FL (ref 6–12)
POTASSIUM BLD-SCNC: 4.1 MMOL/L (ref 3.5–5.3)
RBC # BLD AUTO: 3.36 10*6/MM3 (ref 4.2–5.4)
SODIUM BLD-SCNC: 131 MMOL/L (ref 135–145)
WBC NRBC COR # BLD: 11.92 10*3/MM3 (ref 4.8–10.8)

## 2017-02-17 PROCEDURE — 97110 THERAPEUTIC EXERCISES: CPT | Performed by: OCCUPATIONAL THERAPIST

## 2017-02-17 PROCEDURE — 97530 THERAPEUTIC ACTIVITIES: CPT | Performed by: PHYSICAL THERAPIST

## 2017-02-17 PROCEDURE — 85027 COMPLETE CBC AUTOMATED: CPT | Performed by: NURSE PRACTITIONER

## 2017-02-17 PROCEDURE — 63710000001 PREDNISONE PER 5 MG: Performed by: NURSE PRACTITIONER

## 2017-02-17 PROCEDURE — 80048 BASIC METABOLIC PNL TOTAL CA: CPT | Performed by: NURSE PRACTITIONER

## 2017-02-17 PROCEDURE — 99231 SBSQ HOSP IP/OBS SF/LOW 25: CPT | Performed by: NEUROLOGICAL SURGERY

## 2017-02-17 PROCEDURE — 97535 SELF CARE MNGMENT TRAINING: CPT | Performed by: OCCUPATIONAL THERAPIST

## 2017-02-17 RX ORDER — HYDROCODONE BITARTRATE AND ACETAMINOPHEN 5; 325 MG/1; MG/1
1 TABLET ORAL EVERY 6 HOURS PRN
Qty: 30 TABLET | Refills: 0 | Status: SHIPPED | OUTPATIENT
Start: 2017-02-17 | End: 2017-02-24

## 2017-02-17 RX ADMIN — Medication 10 ML: at 22:05

## 2017-02-17 RX ADMIN — Medication 250 MG: at 08:18

## 2017-02-17 RX ADMIN — DOCUSATE SODIUM AND SENNOSIDES 2 TABLET: 8.6; 5 TABLET, FILM COATED ORAL at 17:35

## 2017-02-17 RX ADMIN — ACETAMINOPHEN 650 MG: 325 TABLET, FILM COATED ORAL at 17:35

## 2017-02-17 RX ADMIN — NYSTATIN: 100000 POWDER TOPICAL at 22:05

## 2017-02-17 RX ADMIN — NITROFURANTOIN MONOHYDRATE/MACROCRYSTALLINE 100 MG: 25; 75 CAPSULE ORAL at 22:05

## 2017-02-17 RX ADMIN — DOCUSATE SODIUM AND SENNOSIDES 2 TABLET: 8.6; 5 TABLET, FILM COATED ORAL at 08:19

## 2017-02-17 RX ADMIN — RIVAROXABAN 10 MG: 10 TABLET, FILM COATED ORAL at 17:34

## 2017-02-17 RX ADMIN — OXYCODONE HYDROCHLORIDE AND ACETAMINOPHEN 1 TABLET: 5; 325 TABLET ORAL at 12:35

## 2017-02-17 RX ADMIN — METOPROLOL SUCCINATE 12.5 MG: 25 TABLET, FILM COATED, EXTENDED RELEASE ORAL at 22:05

## 2017-02-17 RX ADMIN — OXYCODONE HYDROCHLORIDE AND ACETAMINOPHEN 1 TABLET: 5; 325 TABLET ORAL at 22:11

## 2017-02-17 RX ADMIN — OXYCODONE HYDROCHLORIDE AND ACETAMINOPHEN 1 TABLET: 5; 325 TABLET ORAL at 04:26

## 2017-02-17 RX ADMIN — NITROFURANTOIN MONOHYDRATE/MACROCRYSTALLINE 100 MG: 25; 75 CAPSULE ORAL at 08:18

## 2017-02-17 RX ADMIN — LOSARTAN POTASSIUM 50 MG: 50 TABLET, FILM COATED ORAL at 08:19

## 2017-02-17 RX ADMIN — NYSTATIN: 100000 POWDER TOPICAL at 08:19

## 2017-02-17 RX ADMIN — PREDNISONE 10 MG: 10 TABLET ORAL at 08:19

## 2017-02-17 RX ADMIN — BISACODYL 10 MG: 10 SUPPOSITORY RECTAL at 06:21

## 2017-02-17 RX ADMIN — Medication 250 MG: at 17:34

## 2017-02-17 NOTE — PROGRESS NOTES
Patient followed by [facility] for service of TOÑITO Ward, notified per voicemail. Will follow. Julienne Dominguez RN, [facility]

## 2017-02-17 NOTE — PROGRESS NOTES
"    Chief complaint:   Chief Complaint   Patient presents with   • Fall   • Altered Mental Status        Subjective     No events    Objective      Vital Signs  Visit Vitals   • /89 (BP Location: Left arm, Patient Position: Lying)   • Pulse 87   • Temp 97.4 °F (36.3 °C) (Oral)   • Resp 16   • Ht 59\" (149.9 cm)   • Wt 150 lb 14.4 oz (68.4 kg)   • SpO2 (P) 98%   • BMI 30.48 kg/m2       Physical Exam  Exam stable    Assessment/Plan:     Hip frx s/p ORIF, dispo    I discussed the patients findings and my recommendations with patient  Patricio Neal MD  02/17/17  1:57 PM          "

## 2017-02-17 NOTE — PROGRESS NOTES
MD Charli Carnes PA-C, [facility] Zam[facility] PA-C, [facility]       Orthopaedic Surgery Progress Note      2/17/2017   8:28 AM    Name:  Tracee Neal  MRN:    [id number]     Acct:     [id number]   Room:  [address] [place] [address] Day: 2     Admit Date: 2/14/2017  PCP: Pascual Patten MD        Subjective:     C/C: POD 2 Days Post-Op    Interval History: Status: remained stable No new complaints.    Pain: improved       Medications:     Allergies:   Allergies   Allergen Reactions   • Albuterol    • Augmentin  [Amoxicillin-Pot Clavulanate]    • Cefdinir    • Cefprozil    • Doxycycline    • Erythromycin    • Moxifloxacin    • Sulfa Antibiotics        Current Meds:   Current Facility-Administered Medications   Medication Dose Route Frequency Provider Last Rate Last Dose   • acetaminophen (TYLENOL) suppository 650 mg  650 mg Rectal Q4H PRN OLMAN Mittal       • acetaminophen (TYLENOL) tablet 650 mg  650 mg Oral Q4H PRN Pastor Lee MD       • bisacodyl (DULCOLAX) suppository 10 mg  10 mg Rectal Daily PRN Tray Martin APRN   10 mg at 02/17/17 0621   • HYDROcodone-acetaminophen (NORCO) 5-325 MG per tablet 1 tablet  1 tablet Oral Q6H PRN Patricio Neal MD   1 tablet at 02/16/17 0952   • HYDROcodone-acetaminophen (NORCO) 7.5-325 MG per tablet 2 tablet  2 tablet Oral Q4H PRN Pastor Lee MD       • HYDROmorphone (DILAUDID) injection 2 mg  2 mg Intramuscular Q4H PRN Pastor Lee MD        And   • naloxone (NARCAN) injection 0.4 mg  0.4 mg Intravenous Q5 Min PRN Pastor Lee MD       • losartan (COZAAR) tablet 50 mg  50 mg Oral Daily Tray Martin APRN   50 mg at 02/17/17 0819   • metoprolol succinate XL (TOPROL-XL) 24 hr tablet 12.5 mg  12.5 mg Oral Nightly Tray Martin APRN   12.5 mg at 02/16/17 2144   • Morphine sulfate (PF) injection 1 mg  1 mg Intravenous Q2H PRN OLMAN Hickey   1 mg at 02/15/17 1304   • nitrofurantoin (macrocrystal-monohydrate) (MACROBID) capsule 100 mg  100 mg  "Oral Q12H Yusef Hay, DO   100 mg at 17 0818   • nystatin (MYCOSTATIN) powder   Topical Q12H Patricio Neal MD       • ondansetron (ZOFRAN) injection 4 mg  4 mg Intravenous Q6H PRN Tray Martin, APRN       • oxyCODONE-acetaminophen (PERCOCET) 5-325 MG per tablet 1 tablet  1 tablet Oral Q6H PRN Yusef Hay DO   1 tablet at 17 0426   • predniSONE (DELTASONE) tablet 10 mg  10 mg Oral Daily With Breakfast Tray Martin, APRN   10 mg at 17 0819   • rivaroxaban (XARELTO) tablet 10 mg  10 mg Oral Daily With Dinner Pastor Lee MD   10 mg at 17 1733   • saccharomyces boulardii (FLORASTOR) capsule 250 mg  250 mg Oral BID Yusef Hay, DO   250 mg at 17 0818   • sennosides-docusate sodium (SENOKOT-S) 8.6-50 MG tablet 2 tablet  2 tablet Oral BID Tray Martin, APRN   2 tablet at 17 0819   • sodium chloride 0.9 % flush 1-10 mL  1-10 mL Intravenous PRN Tray Martin, APRN   10 mL at 17 2145           Data:     Code Status:  Conditional Code    History reviewed. No pertinent family history.    Social History     Social History   • Marital status: Single     Spouse name: N/A   • Number of children: N/A   • Years of education: N/A     Occupational History   • Not on file.     Social History Main Topics   • Smoking status: Never Smoker   • Smokeless tobacco: Not on file   • Alcohol use No   • Drug use: No   • Sexual activity: Defer     Other Topics Concern   • Not on file     Social History Narrative       Vitals:  Visit Vitals   • BP (P) 139/89 (BP Location: Left arm, Patient Position: Lying)   • Pulse (P) 87   • Temp (P) 97.4 °F (36.3 °C) (Oral)   • Resp (P) 16   • Ht 59\" (149.9 cm)   • Wt 150 lb 14.4 oz (68.4 kg)   • SpO2 99%   • BMI 30.48 kg/m2     Temp (24hrs), Av.4 °F (36.3 °C), Min:97.3 °F (36.3 °C), Max:97.5 °F (36.4 °C)      I/O (24Hr):    Intake/Output Summary (Last 24 hours) at 17 0828  Last data filed at 17 0428   Gross per 24 hour "   Intake    320 ml   Output    100 ml   Net    220 ml       Labs:  Lab Results (last 72 hours)     Procedure Component Value Units Date/Time    CBC & Differential [[id number]] Collected:  02/14/17 1248    Specimen:  Blood Updated:  02/14/17 1506    Narrative:       The following orders were created for panel order CBC & Differential.  Procedure                               Abnormality         Status                     ---------                               -----------         ------                     CBC Auto Differential[[id number]]         Abnormal            Final result                 Please view results for these tests on the individual orders.    CBC Auto Differential [[id number]]  (Abnormal) Collected:  02/14/17 1248    Specimen:  Blood from Arm, Right Updated:  02/14/17 1506     WBC 15.33 (H) 10*3/mm3      RBC 4.33 10*6/mm3      Hemoglobin 14.0 g/dL      Hematocrit 41.1 %      MCV 94.9 fL      MCH 32.3 (H) pg      MCHC 34.1 g/dL      RDW 14.4 %      RDW-SD 49.8 fl      MPV 11.3 fL      Platelets 267 10*3/mm3      Neutrophil % 90.3 (H) %      Lymphocyte % 4.4 (L) %      Monocyte % 4.0 %      Eosinophil % 0.0 %      Basophil % 0.1 %      Immature Grans % 1.2 %      Neutrophils, Absolute 13.84 (H) 10*3/mm3      Lymphocytes, Absolute 0.67 (L) 10*3/mm3      Monocytes, Absolute 0.62 10*3/mm3      Eosinophils, Absolute 0.00 10*3/mm3      Basophils, Absolute 0.02 10*3/mm3      Immature Grans, Absolute 0.18 (H) 10*3/mm3     aPTT [[id number]]  (Normal) Collected:  02/14/17 1248    Specimen:  Blood from Arm, Right Updated:  02/14/17 1518     PTT 29.3 seconds     Protime-INR [[id number]]  (Normal) Collected:  02/14/17 1248    Specimen:  Blood from Arm, Right Updated:  02/14/17 1518     Protime 13.2 Seconds      INR 0.97     Comprehensive Metabolic Panel [[id number]]  (Abnormal) Collected:  02/14/17 1248    Specimen:  Blood from Arm, Right Updated:  02/14/17 1523     Glucose 163 (H) mg/dL      BUN 22 (H) mg/dL      Creatinine  0.77 mg/dL      Sodium 129 (L) mmol/L      Potassium 4.7 mmol/L      Chloride 96 (L) mmol/L      CO2 23.0 (L) mmol/L      Calcium 9.6 mg/dL      Total Protein 7.2 g/dL      Albumin 4.20 g/dL      ALT (SGPT) 39 U/L      AST (SGOT) 28 U/L      Alkaline Phosphatase 116 U/L      Total Bilirubin 1.3 (H) mg/dL      eGFR Non African Amer 71 mL/min/1.73      Globulin 3.0 gm/dL      A/G Ratio 1.4 g/dL      BUN/Creatinine Ratio 28.6 (H)      Anion Gap 10.0 mmol/L     Narrative:       The MDRD GFR formula is only valid for adults with stable renal function between ages 18 and 70.    BNP [[id number]]  (Normal) Collected:  02/14/17 1248    Specimen:  Blood from Arm, Right Updated:  02/14/17 1836     proBNP 320.0 pg/mL     Urinalysis With / Culture If Indicated [[id number]]  (Abnormal) Collected:  02/14/17 1844    Specimen:  Urine from Urine, Clean Catch Updated:  02/14/17 1914     Color, UA Dark Yellow (A)      Appearance, UA Cloudy (A)      pH, UA <=5.0      Specific Gravity, UA 1.024      Glucose, UA Negative      Ketones, UA Trace (A)      Bilirubin, UA Negative      Blood, UA Negative      Protein, UA 30 mg/dL (1+) (A)      Leuk Esterase, UA Small (1+) (A)      Nitrite, UA Negative      Urobilinogen, UA 1.0 E.U./dL     Urinalysis, Microscopic Only [[id number]]  (Abnormal) Collected:  02/14/17 1844    Specimen:  Urine from Urine, Clean Catch Updated:  02/14/17 1914     RBC, UA 0-2 (A) /HPF      WBC, UA 21-30 (A) /HPF      Bacteria, UA 4+ (A) /HPF      Squamous Epithelial Cells, UA None Seen /HPF      Hyaline Casts, UA None Seen /LPF      Methodology Automated Microscopy     CBC (No Diff) [[id number]]  (Abnormal) Collected:  02/15/17 0447    Specimen:  Blood Updated:  02/15/17 0535     WBC 15.76 (H) 10*3/mm3      RBC 3.92 (L) 10*6/mm3      Hemoglobin 12.5 g/dL      Hematocrit 36.7 (L) %      MCV 93.6 fL      MCH 31.9 pg      MCHC 34.1 g/dL      RDW 14.4 %      RDW-SD 48.7 fl      MPV 10.8 fL      Platelets 206 10*3/mm3     Basic  Metabolic Panel [[id number]]  (Abnormal) Collected:  02/15/17 0447    Specimen:  Blood Updated:  02/15/17 0552     Glucose 105 (H) mg/dL      BUN 21 mg/dL      Creatinine 0.59 mg/dL      Sodium 130 (L) mmol/L      Potassium 4.6 mmol/L      Chloride 100 mmol/L      CO2 23.0 (L) mmol/L      Calcium 8.9 mg/dL      eGFR Non African Amer 97 mL/min/1.73      BUN/Creatinine Ratio 35.6 (H)      Anion Gap 7.0 mmol/L     Narrative:       The MDRD GFR formula is only valid for adults with stable renal function between ages 18 and 70.    CBC (No Diff) [[id number]]  (Abnormal) Collected:  02/16/17 0558    Specimen:  Blood Updated:  02/16/17 0623     WBC 14.87 (H) 10*3/mm3      RBC 3.45 (L) 10*6/mm3      Hemoglobin 10.9 (L) g/dL      Hematocrit 32.1 (L) %      MCV 93.0 fL      MCH 31.6 pg      MCHC 34.0 g/dL      RDW 14.1 %      RDW-SD 48.3 fl      MPV 10.5 fL      Platelets 153 10*3/mm3     Basic Metabolic Panel [[id number]]  (Abnormal) Collected:  02/16/17 0558    Specimen:  Blood Updated:  02/16/17 0632     Glucose 106 (H) mg/dL      BUN 17 mg/dL      Creatinine 0.57 mg/dL      Sodium 131 (L) mmol/L      Potassium 4.3 mmol/L      Chloride 101 mmol/L      CO2 23.0 (L) mmol/L      Calcium 8.7 mg/dL      eGFR Non African Amer 101 mL/min/1.73      BUN/Creatinine Ratio 29.8 (H)      Anion Gap 7.0 mmol/L     Narrative:       The MDRD GFR formula is only valid for adults with stable renal function between ages 18 and 70.    Urine Culture [[id number]]  (Abnormal)  (Susceptibility) Collected:  02/14/17 1844    Specimen:  Urine from Urine, Clean Catch Updated:  02/16/17 0740     Urine Culture >100,000 CFU/mL Escherichia coli (A)     Susceptibility      Escherichia coli     HECTOR     Ampicillin 4 ug/ml Susceptible     Ampicillin + Sulbactam <=2 ug/ml Susceptible     Cefazolin <=4 ug/ml Susceptible  [1]      Cefepime <=1 ug/ml Susceptible     Ceftriaxone <=1 ug/ml Susceptible     Ertapenem <=0.5 ug/ml Susceptible     ESBL Confirmation Test NEG   Negative     Gentamicin <=1 ug/ml Susceptible     Levofloxacin <=0.12 ug/ml Susceptible     Meropenem <=0.25 ug/ml Susceptible     Nitrofurantoin <=16 ug/ml Susceptible     Piperacillin + Tazobactam <=4 ug/ml Susceptible     Trimethoprim + Sulfamethoxazole <=20 ug/ml Susceptible            [1]   Cefazolin results may be used to predict the potential effectiveness of oral cephalosporins for treating uncomplicated urinary tract infections.                 Blood Culture [[id number]]  (Normal) Collected:  02/14/17 1248    Specimen:  Blood from Arm, Right Updated:  02/16/17 1601     Blood Culture No growth at 2 days     CBC (No Diff) [[id number]]  (Abnormal) Collected:  02/17/17 0550    Specimen:  Blood Updated:  02/17/17 0617     WBC 11.92 (H) 10*3/mm3      RBC 3.36 (L) 10*6/mm3      Hemoglobin 10.6 (L) g/dL      Hematocrit 31.3 (L) %      MCV 93.2 fL      MCH 31.5 pg      MCHC 33.9 g/dL      RDW 14.1 %      RDW-SD 48.2 fl      MPV 11.1 fL      Platelets 162 10*3/mm3     Basic Metabolic Panel [[id number]]  (Abnormal) Collected:  02/17/17 0550    Specimen:  Blood Updated:  02/17/17 0628     Glucose 77 mg/dL      BUN 21 mg/dL      Creatinine 0.59 mg/dL      Sodium 131 (L) mmol/L      Potassium 4.1 mmol/L      Chloride 101 mmol/L      CO2 24.0 mmol/L      Calcium 8.9 mg/dL      eGFR Non African Amer 97 mL/min/1.73      BUN/Creatinine Ratio 35.6 (H)      Anion Gap 6.0 mmol/L     Narrative:       The MDRD GFR formula is only valid for adults with stable renal function between ages 18 and 70.              Physical Exam:     Left Hip: Incision is clean, dry, intact. Dressing is Clean, Dry, Intact. Neurovascular intact distally. Moderate tenderness to palpation, soft throughout. No signs or symptoms of DVT or PE.      Assessment:     Primary Problem  POD 2 Days Post-Op          Plan:     1. Continue PT/OT.  2. Continue DVT prophylaxis.  3. Continue WBAT left lower extremity.  4. Anticipate discharge tomorrow  if pain well  controlled.      Electronically signed by Pastor Lee MD on 2/17/2017 at 8:28 AM

## 2017-02-17 NOTE — PROGRESS NOTES
Continued Stay Note   Sophie     Patient Name: Tracee Neal  MRN: [id number]  Today's Date: 2/17/2017    Admit Date: 2/14/2017          Discharge Plan       02/17/17 0934    Case Management/Social Work Plan    Plan [facility] tomorrow 2/18    Additional Comments DC order has been entered. However, patient will not have three night inpatient stay until tomorrow 2/18. Plan is discharge tomorrow AM. Staff updated. Discharge summary and discharge med rec will need to be faxed to facility at [phone]. Patient's nurse will call report to [phone].              Discharge Codes     None        Expected Discharge Date and Time     Expected Discharge Date Expected Discharge Time    Feb 18, 2017             ANGÉLICA Gao

## 2017-02-17 NOTE — PROGRESS NOTES
[facility] Medicine Services  INPATIENT PROGRESS NOTE    Length of Stay: 2  Date of Admission: 2/14/2017  Primary Care Physician: Pascual Patten MD    Subjective   Chief Complaint: follow up UTI  HPI   Pt is trying to work with PT. Difficult to stand. Pt states she still has hip pain. No nausea/vomiting.     Review of Systems   All pertinent negatives and positives are as above. All other systems have been reviewed and are negative unless otherwise stated.     Objective    Temp:  [97.3 °F (36.3 °C)-97.5 °F (36.4 °C)] 97.4 °F (36.3 °C)  Heart Rate:  [76-98] 87  Resp:  [16-18] 16  BP: (127-152)/(65-89) 139/89  Physical Exam   Constitutional: She appears well-developed and well-nourished.   HENT:   Head: Normocephalic and atraumatic.   Eyes: Conjunctivae and EOM are normal. Pupils are equal, round, and reactive to light.   Neck: Normal range of motion. Neck supple. No JVD present. No thyromegaly present.   Cardiovascular: Normal rate, regular rhythm, normal heart sounds and intact distal pulses.  Exam reveals no gallop and no friction rub.    No murmur heard.  Pulmonary/Chest: Effort normal and breath sounds normal. No respiratory distress. She has no wheezes. She has no rales. She exhibits no tenderness.   Abdominal: Soft. Bowel sounds are normal. She exhibits no distension. There is no tenderness. There is no rebound and no guarding.   Musculoskeletal: Normal range of motion. She exhibits no edema, tenderness or deformity.   Lymphadenopathy:     She has no cervical adenopathy.   Neurological: She is alert. She displays normal reflexes. No cranial nerve deficit. She exhibits normal muscle tone.   Skin: Skin is warm and dry. No rash noted. Erythema: left lower extremity skin tear with steri strips.    Psychiatric: She has a normal mood and affect. Her behavior is normal. Judgment and thought content normal.       Results Review:  I have reviewed the labs, radiology results, and  diagnostic studies.    Laboratory Data:     Results from last 7 days  Lab Units 02/17/17  0550 02/16/17  0558 02/15/17  0447   WBC 10*3/mm3 11.92* 14.87* 15.76*   HEMOGLOBIN g/dL 10.6* 10.9* 12.5   HEMATOCRIT % 31.3* 32.1* 36.7*   PLATELETS 10*3/mm3 162 153 206       Results from last 7 days  Lab Units 02/17/17  0550 02/16/17  0558 02/15/17  0447 02/14/17  1248   SODIUM mmol/L 131* 131* 130* 129*   POTASSIUM mmol/L 4.1 4.3 4.6 4.7   CHLORIDE mmol/L 101 101 100 96*   TOTAL CO2 mmol/L 24.0 23.0* 23.0* 23.0*   BUN mg/dL 21 17 21 22*   CREATININE mg/dL 0.59 0.57 0.59 0.77   CALCIUM mg/dL 8.9 8.7 8.9 9.6   BILIRUBIN mg/dL  --   --   --  1.3*   ALK PHOS U/L  --   --   --  116   ALT (SGPT) U/L  --   --   --  39   AST (SGOT) U/L  --   --   --  28   GLUCOSE mg/dL 77 106* 105* 163*     Culture Data:   BLOOD CULTURE   Date Value Ref Range Status   02/14/2017 No growth at 2 days  Preliminary     URINE CULTURE   Date Value Ref Range Status   02/14/2017 >100,000 CFU/mL Escherichia coli (A)  Final     Radiology Data:   Imaging Results (last 24 hours)     ** No results found for the last 24 hours. **        I have reviewed the patient current medications.     Assessment/Plan     Hospital Problem List     Generalized weakness    Altered mental status      Assessment:  1. Recurrent falls.  2. Altered mental status, likely secondary to recurrent UTI.  3. Acute, mildly displaced left intertrochanteric hip fracture. POD #2 left short TFN  4. Subacute or chronic right sacral ala insufficiency fracture.  5. E. coli UTI.-pan sensitive  6. Hyponatremia, chronic, SIADH.   7. History of ulcerative colitis-chronic prednisone.  8. History of Clostridium difficile colitis.  9. Hyperlipidemia.  10. Interstitial opacities, pulmonary edema versus interstitial pneumonitis, asymptomatic.  11.  Hypertension.   12. Poor pain control    Plan:  1. Continue PT/OT  2. Antibiotics day 3 for E. Coli UTI  3. Anticipate DC to [facility] in am.   4.  Continue probiotics.     Discharge Planning: I expect patient to be discharged to SNF in 1 days.    OLMAN Flaherty   02/17/17   10:46 AM     I personally evaluated and examined the patient in conjunction with OLMAN Sanches and agree with the assessment, treatment plan, and disposition of the patient as recorded by her. My history, exam, and further recommendations are:     Stable.  No new complaints.  Still having some soreness and postoperative pain.  This is typically dealt with with Norco.  Continue to treat her urinary tract infection.  She has a bed at [facility] tomorrow if everything is well.    I discussed the case with her niece, Loulou.    Yusef Hay,   02/17/17  3:17 PM

## 2017-02-17 NOTE — DISCHARGE INSTRUCTIONS
Lower Extremity Post-op Instructions  Dr. Pastor Lee        POST-OP CARE: Please follow these instructions closely!    Weight Bearing: Your surgery requires that you have the following weight bearing restrictions:   __x___ Weight Bearing as tolerated (with or without crutches)   _____ Touch-Toe Weight-bearing    _____ Partial Weight Bearing  ___ % for ___ weeks (must use crutches)   _____ Non Weight Bearing for ____ weeks (must use crutches, wheelchair or                          knee walker)    IMPORTANT PHONE NUMBERS:  • For emergencies, please call 911  • You may reach Dr. Lee or his medical assistant Breanna Lee at [phone] M-F 8:0am-5:00pm  • After 5pm or on the weekends, please call [phone] to reach the doctor on call.  • Call immediately if you have any of the following symptoms:  - Elevated temperature above 101 degrees for more than 48hours after surgery  - Persistent drainage  from wound  - Severe pain around surgical site  - Calf pain  - Any signs of infection    Dressings: Do NOT remove dressing/splint unless noted below. SOME DRAINAGE IS NORMAL!  • DO NOT touch, remove, or apply ointment to the incision and/or steri strips  • Signs of infection that warrant a phone call to our clinical line:  o Excessive drainage or redness  o Red streaking coming away from the incision  o Increased pain  o Increased temperature above 101 degrees    Sutures:  If your physician uses sutures in your knee, they will dissolve on their own and will not need to be removed.  Some black sutures occasionally used will need to be removed 10-14 days after surgery.    Bathing:  If stated above, it is ok to remove your postop dressing and shower.  DO NOT SOAK the incision in water.  Pat the incision site dry after surgery  ___ You may remove your dressing and shower on ________   _x__Keep your splint/dressing clean, dry, intact.  Do not place foreign objects inside the splint/dressing.    Elevate: Place 2 pillows under your  ankle to get the incision area above the level of the heart to help in swelling (a recliner is not elevated!!!)    Ice: Ice your surgery site 5-6 times per day for 20 minutes at a time with dressing in place. You should wait at least 30 minutes before icing again to avoid ice irritation. It may be difficult at first to ice the surgery area due to the amount of dressing, but continue to be diligent with icing.  Your dressings will be taken down at your first post-op appointment.     Range of motion:   - For the knee- It is important to keep the knee as straight as possible following surgery. NO PILLOWS UNDER THE KNEE, ONLY under the ankle  - For the foot/ankle- range of motion restrictions will be given to you at your first post-op appointment. Until that time, avoid any unnecessary range o f motion.  - Physical therapy- Your physical therapy status will be discussed with you at your first post-op appointment.   **Achieving range of motion goals and decreasing swelling/inflammation are the primary focus for the first two (2) weeks following surgery. **    Medications: You will be discharged with the appropriate medications following your surgery. Fill these at the pharmacy and take them as directed on the label.   Possible medications that will be prescribed are below.  You may or may not receive all of these. Occasionally, additional medications may be given with specific instructions.  Percocet/Lortab (oxycodone/hydrocodone with tylenol) - Pain Medication.  o Take one tablet every 4-6 hours. DO NOT EXCEED 4,000mg of Tylenol in 24 hours.  **DO NOT MIX WITH ALCOHOL, DRIVE WHILE TAKING, OR TAKE EXTRA TYLENOL*     Zofran - Anti-nausea medication to help prevent nausea and vomiting after                             Surgery.     Aspirin 81 mg - all patients with lower extremity surgery should take one aspirin                            81 mg for 17 days after surgery    **If you are running low on pain medications,  please notify us if you need a refill 24-48 hours prior to when you run out, so we can make arrangements to refill the prescription for you if we determine it is necessary**

## 2017-02-17 NOTE — PLAN OF CARE
Problem: Patient Care Overview (Adult)  Goal: Plan of Care Review  Outcome: Ongoing (interventions implemented as appropriate)    02/17/17 0522   Coping/Psychosocial Response Interventions   Plan Of Care Reviewed With patient   Patient Care Overview   Progress progress toward functional goals as expected   Outcome Evaluation   Outcome Summary/Follow up Plan Medicated twice for relief of left hip pain with prn pain meds, effective. Dressing to left hip clean/dry and intact. Repositioned every 2 hours. Patient does not like to be repositioned. ppp        Goal: Adult Individualization and Mutuality  Outcome: Ongoing (interventions implemented as appropriate)  Goal: Discharge Needs Assessment  Outcome: Ongoing (interventions implemented as appropriate)    Problem: Confusion, Acute (Adult)  Goal: Cognitive/Functional Impairments Minimized  Outcome: Ongoing (interventions implemented as appropriate)  Goal: Safety  Outcome: Ongoing (interventions implemented as appropriate)    Problem: Fractured Hip (Adult)  Goal: Signs and Symptoms of Listed Potential Problems Will be Absent or Manageable (Fractured Hip)  Outcome: Ongoing (interventions implemented as appropriate)    Problem: Perioperative Period (Adult)  Goal: Signs and Symptoms of Listed Potential Problems Will be Absent or Manageable (Perioperative Period)  Outcome: Ongoing (interventions implemented as appropriate)    Problem: Pressure Ulcer Risk (Lowell Scale) (Adult,Obstetrics,Pediatric)  Goal: Identify Related Risk Factors and Signs and Symptoms  Outcome: Ongoing (interventions implemented as appropriate)  Goal: Skin Integrity  Outcome: Ongoing (interventions implemented as appropriate)

## 2017-02-17 NOTE — PLAN OF CARE
Problem: Patient Care Overview (Adult)  Goal: Plan of Care Review  Outcome: Ongoing (interventions implemented as appropriate)    02/17/17 5701   Coping/Psychosocial Response Interventions   Plan Of Care Reviewed With patient   Patient Care Overview   Progress progress toward functional goals is gradual   Outcome Evaluation   Outcome Summary/Follow up Plan OT tx completed. Max A for bed mobility and sit to stand transfers. Max A for LB dressing. B UE AROM completed. Anticipted d/c is SNF.

## 2017-02-17 NOTE — PLAN OF CARE
Problem: Patient Care Overview (Adult)  Goal: Plan of Care Review  Outcome: Ongoing (interventions implemented as appropriate)    02/17/17 1523   Coping/Psychosocial Response Interventions   Plan Of Care Reviewed With patient   Patient Care Overview   Progress improving       Goal: Adult Individualization and Mutuality  Outcome: Ongoing (interventions implemented as appropriate)  Goal: Discharge Needs Assessment  Outcome: Ongoing (interventions implemented as appropriate)    Problem: Confusion, Acute (Adult)  Goal: Cognitive/Functional Impairments Minimized  Outcome: Ongoing (interventions implemented as appropriate)    02/17/17 1523   Confusion, Acute (Adult)   Cognitive/Functional Impairments Minimized making progress toward outcome       Goal: Safety  Outcome: Ongoing (interventions implemented as appropriate)    02/17/17 1523   Confusion, Acute (Adult)   Safety making progress toward outcome         Problem: Fractured Hip (Adult)  Goal: Signs and Symptoms of Listed Potential Problems Will be Absent or Manageable (Fractured Hip)  Outcome: Ongoing (interventions implemented as appropriate)    02/17/17 1523   Fractured Hip   Problems Assessed (Fractured Hip) all   Problems Present (Fractured Hip) pain;skin breakdown         Problem: Perioperative Period (Adult)  Goal: Signs and Symptoms of Listed Potential Problems Will be Absent or Manageable (Perioperative Period)  Outcome: Ongoing (interventions implemented as appropriate)    02/17/17 1523   Perioperative Period   Problems Assessed (Perioperative Period) all   Problems Present (Perioperative Period) pain         Problem: Pressure Ulcer Risk (Lowell Scale) (Adult,Obstetrics,Pediatric)  Goal: Identify Related Risk Factors and Signs and Symptoms  Outcome: Ongoing (interventions implemented as appropriate)    02/17/17 1523   Pressure Ulcer Risk (Lowell Scale)   Related Risk Factors (Pressure Ulcer Risk (Lowell Scale)) age extremes;hospitalization prolonged;infection        Goal: Skin Integrity  Outcome: Ongoing (interventions implemented as appropriate)    02/17/17 1523   Pressure Ulcer Risk (Lowell Scale) (Adult,Obstetrics,Pediatric)   Skin Integrity making progress toward outcome

## 2017-02-18 VITALS
HEART RATE: 103 BPM | TEMPERATURE: 98.6 F | BODY MASS INDEX: 30.42 KG/M2 | DIASTOLIC BLOOD PRESSURE: 87 MMHG | HEIGHT: 59 IN | SYSTOLIC BLOOD PRESSURE: 150 MMHG | WEIGHT: 150.9 LBS | RESPIRATION RATE: 20 BRPM | OXYGEN SATURATION: 94 %

## 2017-02-18 PROBLEM — S72.009A HIP FRACTURE REQUIRING OPERATIVE REPAIR (HCC): Status: ACTIVE | Noted: 2017-02-18

## 2017-02-18 LAB
ANION GAP SERPL CALCULATED.3IONS-SCNC: 6 MMOL/L (ref 4–13)
BUN BLD-MCNC: 22 MG/DL (ref 5–21)
BUN/CREAT SERPL: 43.1 (ref 7–25)
CALCIUM SPEC-SCNC: 9 MG/DL (ref 8.4–10.4)
CHLORIDE SERPL-SCNC: 99 MMOL/L (ref 98–110)
CO2 SERPL-SCNC: 25 MMOL/L (ref 24–31)
CREAT BLD-MCNC: 0.51 MG/DL (ref 0.5–1.4)
DEPRECATED RDW RBC AUTO: 48.1 FL (ref 40–54)
ERYTHROCYTE [DISTWIDTH] IN BLOOD BY AUTOMATED COUNT: 14.1 % (ref 12–15)
GFR SERPL CREATININE-BSD FRML MDRD: 115 ML/MIN/1.73
GLUCOSE BLD-MCNC: 75 MG/DL (ref 70–100)
HCT VFR BLD AUTO: 31.5 % (ref 37–47)
HGB BLD-MCNC: 10.6 G/DL (ref 12–16)
MCH RBC QN AUTO: 31.3 PG (ref 28–32)
MCHC RBC AUTO-ENTMCNC: 33.7 G/DL (ref 33–36)
MCV RBC AUTO: 92.9 FL (ref 82–98)
PLATELET # BLD AUTO: 176 10*3/MM3 (ref 130–400)
PMV BLD AUTO: 11 FL (ref 6–12)
POTASSIUM BLD-SCNC: 4.2 MMOL/L (ref 3.5–5.3)
RBC # BLD AUTO: 3.39 10*6/MM3 (ref 4.2–5.4)
SODIUM BLD-SCNC: 130 MMOL/L (ref 135–145)
WBC NRBC COR # BLD: 10.74 10*3/MM3 (ref 4.8–10.8)

## 2017-02-18 PROCEDURE — 80048 BASIC METABOLIC PNL TOTAL CA: CPT | Performed by: NURSE PRACTITIONER

## 2017-02-18 PROCEDURE — 85027 COMPLETE CBC AUTOMATED: CPT | Performed by: NURSE PRACTITIONER

## 2017-02-18 PROCEDURE — 99238 HOSP IP/OBS DSCHRG MGMT 30/<: CPT | Performed by: NEUROLOGICAL SURGERY

## 2017-02-18 PROCEDURE — 63710000001 PREDNISONE PER 5 MG: Performed by: NURSE PRACTITIONER

## 2017-02-18 RX ORDER — NITROFURANTOIN 25; 75 MG/1; MG/1
100 CAPSULE ORAL EVERY 12 HOURS SCHEDULED
Qty: 7 CAPSULE | Refills: 0 | Status: SHIPPED | OUTPATIENT
Start: 2017-02-18 | End: 2017-04-13

## 2017-02-18 RX ORDER — SACCHAROMYCES BOULARDII 250 MG
250 CAPSULE ORAL 2 TIMES DAILY
Qty: 60 CAPSULE | Refills: 0 | Status: SHIPPED | OUTPATIENT
Start: 2017-02-18

## 2017-02-18 RX ORDER — HYDROCODONE BITARTRATE AND ACETAMINOPHEN 5; 325 MG/1; MG/1
1 TABLET ORAL EVERY 6 HOURS PRN
Qty: 60 TABLET | Refills: 0 | Status: SHIPPED | OUTPATIENT
Start: 2017-02-18 | End: 2017-04-18 | Stop reason: HOSPADM

## 2017-02-18 RX ADMIN — LOSARTAN POTASSIUM 50 MG: 50 TABLET, FILM COATED ORAL at 08:35

## 2017-02-18 RX ADMIN — OXYCODONE HYDROCHLORIDE AND ACETAMINOPHEN 1 TABLET: 5; 325 TABLET ORAL at 04:28

## 2017-02-18 RX ADMIN — PREDNISONE 10 MG: 10 TABLET ORAL at 08:35

## 2017-02-18 RX ADMIN — NYSTATIN: 100000 POWDER TOPICAL at 08:35

## 2017-02-18 RX ADMIN — Medication 250 MG: at 08:35

## 2017-02-18 RX ADMIN — NITROFURANTOIN MONOHYDRATE/MACROCRYSTALLINE 100 MG: 25; 75 CAPSULE ORAL at 08:35

## 2017-02-18 RX ADMIN — DOCUSATE SODIUM AND SENNOSIDES 2 TABLET: 8.6; 5 TABLET, FILM COATED ORAL at 08:35

## 2017-02-18 NOTE — PROGRESS NOTES
"    [facility] Medicine Services  INPATIENT PROGRESS NOTE    Length of Stay: 3  Date of Admission: 2/14/2017  Primary Care Physician: Pascual Patten MD    Subjective   Chief Complaint: left hip pain  HPI   Pt required pain medication overnight. Nursing staff states she assisted with turning throughout the night. Anticipate dc to the nursing home today. Pt states, \"whats' broke.\" Explained to pt she had a broken hip that was fixed. She is WBAT. Has been weaned off oxygen is now on room air.     Review of Systems   All pertinent negatives and positives are as above. All other systems have been reviewed and are negative unless otherwise stated.     Objective    Temp:  [97.2 °F (36.2 °C)-97.8 °F (36.6 °C)] 97.3 °F (36.3 °C)  Heart Rate:  [80-87] 80  Resp:  [16-20] 20  BP: (132-160)/(77-93) 136/77  Physical Exam   Constitutional: She appears well-developed and well-nourished.   HENT:   Head: Normocephalic and atraumatic.   Eyes: EOM are normal. Pupils are equal, round, and reactive to light.   Neck: Normal range of motion. Neck supple.   Cardiovascular: Normal rate.    Pulmonary/Chest: Effort normal and breath sounds normal.   Abdominal: Soft. Bowel sounds are normal.   Musculoskeletal: She exhibits edema (LLE).   Neurological: She is alert.   Skin: Skin is warm.   Multiple areas of ecchymosis over entire body. 2 areas of redness/pressure coccyx. mepilex applied to buttocks with guille's butt cream. Present on admission.    Psychiatric: She has a normal mood and affect.     Results Review:  I have reviewed the labs, radiology results, and diagnostic studies.    Laboratory Data:     Results from last 7 days  Lab Units 02/18/17  0503 02/17/17  0550 02/16/17  0558   WBC 10*3/mm3 10.74 11.92* 14.87*   HEMOGLOBIN g/dL 10.6* 10.6* 10.9*   HEMATOCRIT % 31.5* 31.3* 32.1*   PLATELETS 10*3/mm3 176 162 153       Results from last 7 days  Lab Units 02/18/17  0503 02/17/17  0550 02/16/17  0558  " 02/14/17  1248   SODIUM mmol/L 130* 131* 131*  < > 129*   POTASSIUM mmol/L 4.2 4.1 4.3  < > 4.7   CHLORIDE mmol/L 99 101 101  < > 96*   TOTAL CO2 mmol/L 25.0 24.0 23.0*  < > 23.0*   BUN mg/dL 22* 21 17  < > 22*   CREATININE mg/dL 0.51 0.59 0.57  < > 0.77   CALCIUM mg/dL 9.0 8.9 8.7  < > 9.6   BILIRUBIN mg/dL  --   --   --   --  1.3*   ALK PHOS U/L  --   --   --   --  116   ALT (SGPT) U/L  --   --   --   --  39   AST (SGOT) U/L  --   --   --   --  28   GLUCOSE mg/dL 75 77 106*  < > 163*   < > = values in this interval not displayed.    Culture Data:   BLOOD CULTURE   Date Value Ref Range Status   02/14/2017 No growth at 3 days  Preliminary     URINE CULTURE   Date Value Ref Range Status   02/14/2017 >100,000 CFU/mL Escherichia coli (A)  Final     Urine Culture >100,000 CFU/mL Escherichia coli (A)           Susceptibility         Escherichia coli         HECTOR         Ampicillin 4  Susceptible         Ampicillin + Sulbactam <=2  Susceptible         Cefazolin <=4  Susceptible 1         Cefepime <=1  Susceptible         Ceftriaxone <=1  Susceptible         Ertapenem <=0.5  Susceptible         ESBL Confirmation Test NEG  Negative         Gentamicin <=1  Susceptible         Levofloxacin <=0.12  Susceptible         Meropenem <=0.25  Susceptible         Nitrofurantoin <=16  Susceptible         Piperacillin + Tazobactam <=4  Susceptible         Trimethoprim + Sulfamethoxazole <=20  Susceptible             I have reviewed the patient current medications.     Assessment/Plan     Hospital Problem List     Generalized weakness    Altered mental status      Assessment:  1. Recurrent falls.  2. Altered mental status, likely secondary to recurrent UTI.  3. Acute, mildly displaced left intertrochanteric hip fracture. POD #3 left short TFN  4. Subacute or chronic right sacral ala insufficiency fracture.  5. E. coli UTI.-pan sensitive  6. Hyponatremia, chronic, SIADH.   7. History of ulcerative colitis-chronic prednisone.  8. History of  Clostridium difficile colitis.  9. Hyperlipidemia.  10. Interstitial opacities, pulmonary edema versus interstitial pneumonitis, asymptomatic.  11. Hypertension.   12. Left hip pain    Plan:  1. Will need to complete a total of 7 days of antibiotics for UTI.   2  OK to nursing home when OK with primary service.   3. Continue PT/OT.     Discharge Planning: I expect patient to be discharged to SNF today.    OLMAN Flaherty   02/18/17   7:00 AM     I personally evaluated and examined the patient in conjunction with OLMAN Sanches and agree with the assessment, treatment plan, and disposition of the patient as recorded by her. My history, exam, and further recommendations are:     No distress. In bed. Left hip clean, dry, and intact. No new complaints.     For transition to [facility] today.  Finish treatment for her pan susceptible Escherichia coli urinary tract infection.  She is currently on nitrofurantoin.  Continue 1500 ML per day fluid restriction for her history of chronic hyponatremia related to SIADH.  Physical and occupational therapy as directed by the orthopedic service.  She will need outpatient follow-up with Dr. Lee.    The case was not discussed with her niece, Loulou, as she is out of town today.  I discussed the case with her yesterday.    Yusef Hay,   02/18/17  1:36 PM

## 2017-02-18 NOTE — DISCHARGE SUMMARY
Date of Discharge:  2/18/2017    Discharge Diagnosis:   Left hip fracture  Altered mental status  Generalized weakness  Urinary tract infection  Hyponatremia, chronic    Presenting Problem/History of Present Illness  Altered mental status [R41.82]  Generalized weakness [R53.1]  Altered mental status [R41.82]  Generalized weakness [R53.1]   Lift hip fracture  Urinary tract infection  Hyponatremia, chronic    Hospital Course  Patient is a 84 y.o. female presented with her mental status and generalized weakness.  She was admitted to the neurosurgery service.  Workup and imaging revealed a left hip fracture, urinary tract infection, hyponatremia.  He speaks and internal medicine reconsult.  She underwent repair of hip fracture and was cleared for discharge to [facility] on 2/18/17.      Procedures Performed  Procedure(s):  HIP TROCANTERIC NAILING SHORT WITH INTRAMEDULLARY HIP SCREW       Consults:   Consults     Date and Time Order Name Status Description    2/15/2017 1547 Inpatient Consult to Orthopedic Surgery In process     2/14/2017 1718 Inpatient Consult to Hospitalist                Vital Signs  Temp:  [97.2 °F (36.2 °C)-97.8 °F (36.6 °C)] 97.3 °F (36.3 °C)  Heart Rate:  [80-87] 80  Resp:  [16-20] 20  BP: (132-160)/(77-93) 136/77    Physical Exam:   NAD  Awake  Follows commands    Discharge Disposition  Rehab Facility or Unit (DC - External)    Discharge Medications   Tracee Neal   Home Medication Instructions SERGIO:[id number]    Printed on:02/18/17 0715   Medication Information                      acetaminophen (TYLENOL) 325 MG tablet  Take 650 mg by mouth 2 (two) times a day as needed for mild pain (1-3).             aspirin 81 MG EC tablet  Take 81 mg by mouth every evening.             docusate sodium 100 MG capsule  Take 100 mg by mouth 2 (Two) Times a Day As Needed for constipation.             HYDROcodone-acetaminophen (NORCO) 5-325 MG per tablet  Take 1 tablet by mouth Every 6 (Six) Hours As Needed  for moderate pain (4-6) for up to 7 days.             hydrocortisone 1 % cream 30 application, zinc oxide 20 % ointment 30 application, bacitracin 500 UNIT/GM ointment 30 application, nystatin 504710 UNIT/GM cream 30 application  Apply 1 application topically As Needed (skin irritation).             losartan (COZAAR) 50 MG tablet  Take 50 mg by mouth daily.             mesalamine (ASACOL) 400 MG EC tablet  Take 800 mg by mouth 2 (two) times a day.             metoprolol succinate XL (TOPROL-XL) 25 MG 24 hr tablet  Take 12.5 mg by mouth Every Evening.             nystatin (NYAMYC) 452090 UNIT/GM powder topical powder  Apply  topically Every 8 (Eight) Hours.             ondansetron (ZOFRAN) 4 MG tablet  Take 4 mg by mouth every 8 (eight) hours as needed for nausea or vomiting.             pantoprazole (PROTONIX) 40 MG EC tablet  Take 40 mg by mouth daily.             predniSONE (DELTASONE) 10 MG tablet  Take 1 tablet by mouth Daily With Breakfast.                 Discharge Diet:     Activity at Discharge:     Follow-up Appointments  No future appointments.      Test Results Pending at Discharge   Order Current Status    Blood Culture Preliminary result           Patricio Neal MD  02/18/17  7:15 AM

## 2017-02-18 NOTE — PLAN OF CARE
Problem: Patient Care Overview (Adult)  Goal: Plan of Care Review  Outcome: Ongoing (interventions implemented as appropriate)    02/18/17 0511   Coping/Psychosocial Response Interventions   Plan Of Care Reviewed With patient   Patient Care Overview   Progress progress toward functional goals is gradual   Outcome Evaluation   Outcome Summary/Follow up Plan Medicated patient twice with prn pain meds for relief of left hip pain, effective. Dressing to left hip clean/dry and intact. Dressing to buttock changed last night. Repositioned patient every 2 hours. Plan to be discharged to [facility] today.       Goal: Adult Individualization and Mutuality  Outcome: Ongoing (interventions implemented as appropriate)  Goal: Discharge Needs Assessment  Outcome: Ongoing (interventions implemented as appropriate)    Problem: Confusion, Acute (Adult)  Goal: Cognitive/Functional Impairments Minimized  Outcome: Ongoing (interventions implemented as appropriate)  Goal: Safety  Outcome: Ongoing (interventions implemented as appropriate)    Problem: Fractured Hip (Adult)  Goal: Signs and Symptoms of Listed Potential Problems Will be Absent or Manageable (Fractured Hip)  Outcome: Ongoing (interventions implemented as appropriate)    Problem: Perioperative Period (Adult)  Goal: Signs and Symptoms of Listed Potential Problems Will be Absent or Manageable (Perioperative Period)  Outcome: Ongoing (interventions implemented as appropriate)    Problem: Pressure Ulcer Risk (Lowell Scale) (Adult,Obstetrics,Pediatric)  Goal: Identify Related Risk Factors and Signs and Symptoms  Outcome: Ongoing (interventions implemented as appropriate)  Goal: Skin Integrity  Outcome: Ongoing (interventions implemented as appropriate)

## 2017-02-19 LAB — BACTERIA SPEC AEROBE CULT: NORMAL

## 2017-02-19 NOTE — THERAPY DISCHARGE NOTE
Acute Care - Physical Therapy Discharge Summary  [facility]       Patient Name: Tracee Neal  : 4/15/1932  MRN: [id number]    Today's Date: 2017  Onset of Illness/Injury or Date of Surgery Date: 17    Date of Referral to PT: 02/15/17  Referring Physician: Dr. Lee      Admit Date: 2017      PT Recommendation and Plan    Visit Dx:    ICD-10-CM ICD-9-CM   1. Generalized weakness R53.1 780.79   2. Thoracic compression fracture, closed, initial encounter S22.000A 805.2   3. Dysphagia, unspecified type R13.10 787.20   4. Impaired physical mobility Z74.09 781.99   5. Impaired mobility and ADLs Z74.09 799.89             Outcome Measures       17 1514 17 1031 17 1411    How much help from another person do you currently need...    Turning from your back to your side while in flat bed without using bedrails?  2  -MS 2  -TR    Moving from lying on back to sitting on the side of a flat bed without bedrails?  2  -MS 2  -TR    Moving to and from a bed to a chair (including a wheelchair)?  1  -MS 2  -TR    Standing up from a chair using your arms (e.g., wheelchair, bedside chair)?  1  -MS 1  -TR    Climbing 3-5 steps with a railing?  1  -MS 1  -TR    To walk in hospital room?  1  -MS 1  -TR    AM-PAC 6 Clicks Score  8  -MS 9  -TR    How much help from another is currently needed...    Putting on and taking off regular lower body clothing? 2  -TRA      Bathing (including washing, rinsing, and drying) 2  -TRA      Toileting (which includes using toilet bed pan or urinal) 2  -TRA      Putting on and taking off regular upper body clothing 3  -TRA      Taking care of personal grooming (such as brushing teeth) 3  -TRA      Eating meals 3  -TRA      Score 15  -TRA      Functional Assessment    Outcome Measure Options AM-PAC 6 Clicks Daily Activity (OT)  -TRA AM-PAC 6 Clicks Basic Mobility (PT)  -MS AM-PAC 6 Clicks Basic Mobility (PT)  -TR      17 1200 17 1116       How much help from  another person do you currently need...    Turning from your back to your side while in flat bed without using bedrails?  2  -LH     Moving from lying on back to sitting on the side of a flat bed without bedrails?  2  -LH     Moving to and from a bed to a chair (including a wheelchair)?  1  -LH     Standing up from a chair using your arms (e.g., wheelchair, bedside chair)?  1  -LH     Climbing 3-5 steps with a railing?  1  -LH     To walk in hospital room?  1  -     AM-PAC 6 Clicks Score  8  -LH     How much help from another is currently needed...    Putting on and taking off regular lower body clothing? 2  -MM      Bathing (including washing, rinsing, and drying) 2  -MM      Toileting (which includes using toilet bed pan or urinal) 2  -MM      Putting on and taking off regular upper body clothing 3  -MM      Taking care of personal grooming (such as brushing teeth) 3  -MM      Eating meals 3  -MM      Score 15  -MM      Functional Assessment    Outcome Measure Options AM-PAC 6 Clicks Daily Activity (OT)  -MM AM-PAC 6 Clicks Basic Mobility (PT)  -       User Key  (r) = Recorded By, (t) = Taken By, (c) = Cosigned By    Initials Name Provider Type     Grabiel Ignacio, PT Physical Therapist    MS Cecilia Lyons, PT DPT Physical Therapist    BELLA Zamudio, PTA Physical Therapy Assistant    HOLLY Tracey, OTR/L Occupational Therapist    MM Demarco Wolf, OTR/L Occupational Therapist                      IP PT Goals       02/19/17 0801 02/16/17 1121       Bed Mobility PT LTG    Bed Mobility PT LTG, Date Established  02/16/17  -     Bed Mobility PT LTG, Time to Achieve  by discharge  -     Bed Mobility PT LTG, Activity Type  all bed mobility  -     Bed Mobility PT LTG, [place] Level  minimum assist (75% patient effort)  -     Bed Mobility PT LTG, Date Goal Reviewed 02/19/17  -      Bed Mobility PT LTG, Outcome goal not met  -      Bed Mobility PT LTG, Reason Goal Not Met discharged  from facility  -      Transfer Training PT LTG    Transfer Training PT LTG, Date Established  02/16/17  -     Transfer Training PT LTG, Time to Achieve  by discharge  -     Transfer Training PT LTG, Activity Type  bed to chair /chair to bed;sit to stand/stand to sit  -     Transfer Training PT LTG, [place] Level  moderate assist (50% patient effort)  -     Transfer Training PT LTG, Assist Device  walker, rolling  -     Transfer Training PT LTG, Additional Goal  RW for bed<->chair t/f  -     Transfer Training PT  LTG, Date Goal Reviewed 02/19/17  -      Transfer Training PT LTG, Outcome goal not met  -      Transfer Training PT LTG, Reason Goal Not Met discharged from [facility]  -        User Key  (r) = Recorded By, (t) = Taken By, (c) = Cosigned By    Initials Name Provider Type     Shraddha Wadsworth, PTA Physical Therapy Assistant     Grabiel Ignacio, PT Physical Therapist              PT Discharge Summary  Reason for Discharge: Discharge from facility  Outcomes Achieved: Refer to plan of care for updates on goals achieved  Discharge Destination: [facility]      Shraddha Wadsworth, RANDOLPH   2/19/2017

## 2017-02-19 NOTE — PLAN OF CARE
Problem: Inpatient Physical Therapy  Goal: Bed Mobility Goal LTG- PT  Outcome: Unable to achieve outcome(s) by discharge Date Met:  02/19/17 02/16/17 1121 02/19/17 0801   Bed Mobility PT LTG   Bed Mobility PT LTG, Date Established 02/16/17 --    Bed Mobility PT LTG, Time to Achieve by discharge --    Bed Mobility PT LTG, Activity Type all bed mobility --    Bed Mobility PT LTG, [place] Level minimum assist (75% patient effort) --    Bed Mobility PT LTG, Date Goal Reviewed --  02/19/17   Bed Mobility PT LTG, Outcome --  goal not met   Bed Mobility PT LTG, Reason Goal Not Met --  discharged from facility       Goal: Transfer Training Goal 1 LTG- PT  Outcome: Unable to achieve outcome(s) by discharge Date Met:  02/19/17 02/16/17 1121 02/19/17 0801   Transfer Training PT LTG   Transfer Training PT LTG, Date Established 02/16/17 --    Transfer Training PT LTG, Time to Achieve by discharge --    Transfer Training PT LTG, Activity Type bed to chair /chair to bed;sit to stand/stand to sit --    Transfer Training PT LTG, [place] Level moderate assist (50% patient effort) --    Transfer Training PT LTG, Assist Device walker, rolling --    Transfer Training PT LTG, Additional Goal RW for bed<->chair t/f --    Transfer Training PT LTG, Date Goal Reviewed --  02/19/17   Transfer Training PT LTG, Outcome --  goal not met   Transfer Training PT LTG, Reason Goal Not Met --  discharged from facility

## 2017-02-19 NOTE — PLAN OF CARE
Problem: Inpatient Occupational Therapy  Goal: Bed Mobility Goal LTG- OT  Outcome: Unable to achieve outcome(s) by discharge Date Met:  02/19/17 02/16/17 1218 02/19/17 0936   Bed Mobility OT LTG   Bed Mobility OT LTG, Date Established 02/16/17 --    Bed Mobility OT LTG, Time to Achieve by discharge --    Bed Mobility OT LTG, Activity Type all bed mobility --    Bed Mobility OT LTG, [place] Level minimum assist (75% patient effort) --    Bed Mobility OT LTG, Assist Device bed rails --    Bed Mobility OT LTG, Date Goal Reviewed --  02/19/17   Bed Mobility OT LTG, Outcome --  goal not met   Bed Mobility OT LTG, Reason Goal Not Met --  discharged from facility       Goal: Transfer Training Goal 1 LTG- OT  Outcome: Unable to achieve outcome(s) by discharge Date Met:  02/19/17 02/16/17 1218 02/19/17 0936   Transfer Training OT LTG   Transfer Training OT LTG, Date Established 02/16/17 --    Transfer Training OT LTG, Time to Achieve by discharge --    Transfer Training OT LTG, Activity Type all transfers --    Transfer Training OT LTG, [place] Level minimum assist (75% patient effort) --    Transfer Training OT LTG, Assist Device walker, rolling --    Transfer Training OT LTG, Date Goal Reviewed --  02/19/17   Transfer Training OT LTG, Outcome --  goal not met   Transfer Training OT LTG, Reason Goal Not Met --  discharged from facility       Goal: Grooming Goal LTG- OT  Outcome: Unable to achieve outcome(s) by discharge Date Met:  02/19/17 02/16/17 1218 02/19/17 0936   Grooming OT LTG   Grooming Goal OT LTG, Date Established 02/16/17 --    Grooming Goal OT LTG, Time to Achieve by discharge --    Grooming Goal OT LTG, [place] Level independent --    Grooming Goal OT LTG, Position sitting, edge of bed;sitting in chair --    Grooming Goal OT LTG, Date Goal Reviewed --  02/19/17   Grooming Goal OT LTG, Outcome --  goal not met   Grooming Goal OT LTG, Reason Goal Not Met --  discharged from facility        Goal: Toileting Goal LTG- OT  Outcome: Unable to achieve outcome(s) by discharge Date Met:  02/19/17 02/16/17 1218 02/19/17 0936   Toileting OT LTG   Toileting Goal OT LTG, Date Established 02/16/17 --    Toileting Goal OT LTG, Time to Achieve by discharge --    Toileting Goal OT LTG, [place] Level minimum assist (75% patient effort) --    Toileting Goal OT LTG, Assist Device toilet seat, raised --    Toileting Goal OT LTG, Date Goal Reviewed --  02/19/17   Toileting Goal OT LTG, Outcome --  goal not met   Toileting Goal OT LTG, Reason Goal Not Met --  discharged from facility

## 2017-02-19 NOTE — THERAPY DISCHARGE NOTE
Acute Care - Occupational Therapy Discharge Summary  [facility]     Patient Name: Tracee Neal  : 4/15/1932  MRN: [id number]    Today's Date: 2017  Onset of Illness/Injury or Date of Surgery Date: 17    Date of Referral to OT: 17  Referring Physician: Dr. Lee      Admit Date: 2017        OT Recommendation and Plan    Visit Dx:    ICD-10-CM ICD-9-CM   1. Generalized weakness R53.1 780.79   2. Thoracic compression fracture, closed, initial encounter S22.000A 805.2   3. Dysphagia, unspecified type R13.10 787.20   4. Impaired physical mobility Z74.09 781.99   5. Impaired mobility and ADLs Z74.09 799.89                     OT Goals       17 0936 17 1218       Bed Mobility OT LTG    Bed Mobility OT LTG, Date Established  17  -MM     Bed Mobility OT LTG, Time to Achieve  by discharge  -MM     Bed Mobility OT LTG, Activity Type  all bed mobility  -MM     Bed Mobility OT LTG, [place] Level  minimum assist (75% patient effort)  -MM     Bed Mobility OT LTG, Assist Device  bed rails  -MM     Bed Mobility OT LTG, Date Goal Reviewed 17  -TR      Bed Mobility OT LTG, Outcome goal not met  -TR      Bed Mobility OT LTG, Reason Goal Not Met discharged from facility  -TR      Transfer Training OT LTG    Transfer Training OT LTG, Date Established  17  -MM     Transfer Training OT LTG, Time to Achieve  by discharge  -MM     Transfer Training OT LTG, Activity Type  all transfers  -MM     Transfer Training OT LTG, [place] Level  minimum assist (75% patient effort)  -MM     Transfer Training OT LTG, Assist Device  walker, rolling  -MM     Transfer Training OT LTG, Date Goal Reviewed 17  -TR      Transfer Training OT LTG, Outcome goal not met  -TR      Transfer Training OT LTG, Reason Goal Not Met discharged from facility  -TR      Grooming OT LTG    Grooming Goal OT LTG, Date Established  17  -MM     Grooming Goal OT LTG, Time to Achieve  by discharge  -MM      Grooming Goal OT LTG, [place] Level  independent  -MM     Grooming Goal OT LTG, Position  sitting, edge of bed;sitting in chair  -MM     Grooming Goal OT LTG, Date Goal Reviewed 02/19/17  -TR      Grooming Goal OT LTG, Outcome goal not met  -TR      Grooming Goal OT LTG, Reason Goal Not Met discharged from facility  -TR      Toileting OT LTG    Toileting Goal OT LTG, Date Established  02/16/17  -MM     Toileting Goal OT LTG, Time to Achieve  by discharge  -MM     Toileting Goal OT LTG, [place] Level  minimum assist (75% patient effort)  -MM     Toileting Goal OT LTG, Assist Device  toilet seat, raised  -MM     Toileting Goal OT LTG, Date Goal Reviewed 02/19/17  -TR      Toileting Goal OT LTG, Outcome goal not met  -TR      Toileting Goal OT LTG, Reason Goal Not Met discharged from facility  -TR        User Key  (r) = Recorded By, (t) = Taken By, (c) = Cosigned By    Initials Name Provider Type    TR Maria Del Carmen Tracey, OTR/L Occupational Therapist    TIFFANIE Wolf OTR/L Occupational Therapist                Outcome Measures       02/17/17 1514 02/17/17 1031 02/16/17 1411    How much help from another person do you currently need...    Turning from your back to your side while in flat bed without using bedrails?  2  -MS 2  -TR    Moving from lying on back to sitting on the side of a flat bed without bedrails?  2  -MS 2  -TR    Moving to and from a bed to a chair (including a wheelchair)?  1  -MS 2  -TR    Standing up from a chair using your arms (e.g., wheelchair, bedside chair)?  1  -MS 1  -TR    Climbing 3-5 steps with a railing?  1  -MS 1  -TR    To walk in hospital room?  1  -MS 1  -TR    AM-PAC 6 Clicks Score  8  -MS 9  -TR    How much help from another is currently needed...    Putting on and taking off regular lower body clothing? 2  -TRA      Bathing (including washing, rinsing, and drying) 2  -TRA      Toileting (which includes using toilet bed pan or urinal) 2  -TRA      Putting on and  taking off regular upper body clothing 3  -TRA      Taking care of personal grooming (such as brushing teeth) 3  -TRA      Eating meals 3  -TRA      Score 15  -TRA      Functional Assessment    Outcome Measure Options AM-PAC 6 Clicks Daily Activity (OT)  -TRA AM-PAC 6 Clicks Basic Mobility (PT)  -MS AM-PAC 6 Clicks Basic Mobility (PT)  -TR      02/16/17 1200 02/16/17 1116       How much help from another person do you currently need...    Turning from your back to your side while in flat bed without using bedrails?  2  -LH     Moving from lying on back to sitting on the side of a flat bed without bedrails?  2  -LH     Moving to and from a bed to a chair (including a wheelchair)?  1  -LH     Standing up from a chair using your arms (e.g., wheelchair, bedside chair)?  1  -LH     Climbing 3-5 steps with a railing?  1  -LH     To walk in hospital room?  1  -LH     AM-PAC 6 Clicks Score  8  -LH     How much help from another is currently needed...    Putting on and taking off regular lower body clothing? 2  -MM      Bathing (including washing, rinsing, and drying) 2  -MM      Toileting (which includes using toilet bed pan or urinal) 2  -MM      Putting on and taking off regular upper body clothing 3  -MM      Taking care of personal grooming (such as brushing teeth) 3  -MM      Eating meals 3  -MM      Score 15  -MM      Functional Assessment    Outcome Measure Options AM-PAC 6 Clicks Daily Activity (OT)  -MM AM-PAC 6 Clicks Basic Mobility (PT)  -LH       User Key  (r) = Recorded By, (t) = Taken By, (c) = Cosigned By    Initials Name Provider Type     Grabiel Ignacio, PT Physical Therapist    MS Cecilia Lyons, PT DPT Physical Therapist    TR Mary Ellen Zamudio, PTA Physical Therapy Assistant    TRA Maria Del Carmen Tracey, OTR/L Occupational Therapist    MM Demarco Wolf, OTR/L Occupational Therapist              OT Discharge Summary  Anticipated Discharge Disposition: extended care facility  Reason for Discharge:  Discharge from facility  Outcomes Achieved: Unable to make functional progress toward goals at this time  Discharge Destination: Extended care facility - LTC      Maria Del Carmen Tracey OTR/SUJATHA  2/19/2017

## 2017-04-13 ENCOUNTER — APPOINTMENT (OUTPATIENT)
Dept: CT IMAGING | Facility: HOSPITAL | Age: 82
End: 2017-04-13

## 2017-04-13 ENCOUNTER — APPOINTMENT (OUTPATIENT)
Dept: GENERAL RADIOLOGY | Facility: HOSPITAL | Age: 82
End: 2017-04-13

## 2017-04-13 ENCOUNTER — HOSPITAL ENCOUNTER (INPATIENT)
Facility: HOSPITAL | Age: 82
LOS: 5 days | Discharge: NURSING, REHAB OR LONG-TERM CARE | End: 2017-04-18
Attending: FAMILY MEDICINE | Admitting: FAMILY MEDICINE

## 2017-04-13 DIAGNOSIS — Z74.09 IMPAIRED MOBILITY AND ADLS: ICD-10-CM

## 2017-04-13 DIAGNOSIS — S72.001A HIP FRACTURE, RIGHT, CLOSED, INITIAL ENCOUNTER (HCC): Primary | ICD-10-CM

## 2017-04-13 DIAGNOSIS — R53.1 GENERALIZED WEAKNESS: ICD-10-CM

## 2017-04-13 DIAGNOSIS — R13.12 OROPHARYNGEAL DYSPHAGIA: ICD-10-CM

## 2017-04-13 DIAGNOSIS — Z78.9 IMPAIRED MOBILITY AND ADLS: ICD-10-CM

## 2017-04-13 DIAGNOSIS — Z74.09 IMPAIRED PHYSICAL MOBILITY: ICD-10-CM

## 2017-04-13 LAB
ABO GROUP BLD: NORMAL
ALBUMIN SERPL-MCNC: 3.7 G/DL (ref 3.5–5)
ALBUMIN/GLOB SERPL: 1.2 G/DL (ref 1.1–2.5)
ALP SERPL-CCNC: 93 U/L (ref 24–120)
ALT SERPL W P-5'-P-CCNC: 20 U/L (ref 0–54)
ANION GAP SERPL CALCULATED.3IONS-SCNC: 13 MMOL/L (ref 4–13)
APTT PPP: 26.6 SECONDS (ref 24.1–34.8)
AST SERPL-CCNC: 23 U/L (ref 7–45)
BACTERIA UR QL AUTO: ABNORMAL /HPF
BASOPHILS # BLD AUTO: 0.03 10*3/MM3 (ref 0–0.2)
BASOPHILS NFR BLD AUTO: 0.3 % (ref 0–2)
BILIRUB SERPL-MCNC: 0.6 MG/DL (ref 0.1–1)
BILIRUB UR QL STRIP: NEGATIVE
BLD GP AB SCN SERPL QL: NEGATIVE
BUN BLD-MCNC: 15 MG/DL (ref 5–21)
BUN/CREAT SERPL: 17.6 (ref 7–25)
CALCIUM SPEC-SCNC: 9.4 MG/DL (ref 8.4–10.4)
CHLORIDE SERPL-SCNC: 94 MMOL/L (ref 98–110)
CLARITY UR: CLEAR
CO2 SERPL-SCNC: 24 MMOL/L (ref 24–31)
COLOR UR: YELLOW
CREAT BLD-MCNC: 0.85 MG/DL (ref 0.5–1.4)
DEPRECATED RDW RBC AUTO: 47.4 FL (ref 40–54)
EOSINOPHIL # BLD AUTO: 0.13 10*3/MM3 (ref 0–0.7)
EOSINOPHIL NFR BLD AUTO: 1.3 % (ref 0–4)
ERYTHROCYTE [DISTWIDTH] IN BLOOD BY AUTOMATED COUNT: 13.8 % (ref 12–15)
GFR SERPL CREATININE-BSD FRML MDRD: 64 ML/MIN/1.73
GLOBULIN UR ELPH-MCNC: 3 GM/DL
GLUCOSE BLD-MCNC: 100 MG/DL (ref 70–100)
GLUCOSE UR STRIP-MCNC: NEGATIVE MG/DL
HCT VFR BLD AUTO: 35.4 % (ref 37–47)
HGB BLD-MCNC: 12.2 G/DL (ref 12–16)
HGB UR QL STRIP.AUTO: NEGATIVE
HYALINE CASTS UR QL AUTO: ABNORMAL /LPF
IMM GRANULOCYTES # BLD: 0.17 10*3/MM3 (ref 0–0.03)
IMM GRANULOCYTES NFR BLD: 1.6 % (ref 0–5)
INR PPP: 0.94 (ref 0.91–1.09)
KETONES UR QL STRIP: ABNORMAL
LEUKOCYTE ESTERASE UR QL STRIP.AUTO: ABNORMAL
LYMPHOCYTES # BLD AUTO: 1.39 10*3/MM3 (ref 0.72–4.86)
LYMPHOCYTES NFR BLD AUTO: 13.4 % (ref 15–45)
MCH RBC QN AUTO: 32.6 PG (ref 28–32)
MCHC RBC AUTO-ENTMCNC: 34.5 G/DL (ref 33–36)
MCV RBC AUTO: 94.7 FL (ref 82–98)
MONOCYTES # BLD AUTO: 0.77 10*3/MM3 (ref 0.19–1.3)
MONOCYTES NFR BLD AUTO: 7.4 % (ref 4–12)
NEUTROPHILS # BLD AUTO: 7.91 10*3/MM3 (ref 1.87–8.4)
NEUTROPHILS NFR BLD AUTO: 76 % (ref 39–78)
NITRITE UR QL STRIP: POSITIVE
PH UR STRIP.AUTO: <=5 [PH] (ref 5–8)
PLATELET # BLD AUTO: 266 10*3/MM3 (ref 130–400)
PMV BLD AUTO: 10.6 FL (ref 6–12)
POTASSIUM BLD-SCNC: 3.6 MMOL/L (ref 3.5–5.3)
PROT SERPL-MCNC: 6.7 G/DL (ref 6.3–8.7)
PROT UR QL STRIP: NEGATIVE
PROTHROMBIN TIME: 12.8 SECONDS (ref 11.9–14.6)
RBC # BLD AUTO: 3.74 10*6/MM3 (ref 4.2–5.4)
RBC # UR: ABNORMAL /HPF
REF LAB TEST METHOD: ABNORMAL
RH BLD: POSITIVE
SODIUM BLD-SCNC: 131 MMOL/L (ref 135–145)
SP GR UR STRIP: 1.02 (ref 1–1.03)
SQUAMOUS #/AREA URNS HPF: ABNORMAL /HPF
UROBILINOGEN UR QL STRIP: ABNORMAL
WBC NRBC COR # BLD: 10.4 10*3/MM3 (ref 4.8–10.8)
WBC UR QL AUTO: ABNORMAL /HPF

## 2017-04-13 PROCEDURE — 85610 PROTHROMBIN TIME: CPT | Performed by: FAMILY MEDICINE

## 2017-04-13 PROCEDURE — 73502 X-RAY EXAM HIP UNI 2-3 VIEWS: CPT

## 2017-04-13 PROCEDURE — 80053 COMPREHEN METABOLIC PANEL: CPT | Performed by: FAMILY MEDICINE

## 2017-04-13 PROCEDURE — 25010000002 HYDROMORPHONE PER 4 MG: Performed by: FAMILY MEDICINE

## 2017-04-13 PROCEDURE — 86901 BLOOD TYPING SEROLOGIC RH(D): CPT | Performed by: FAMILY MEDICINE

## 2017-04-13 PROCEDURE — 71010 HC CHEST PA OR AP: CPT

## 2017-04-13 PROCEDURE — 86920 COMPATIBILITY TEST SPIN: CPT

## 2017-04-13 PROCEDURE — 85025 COMPLETE CBC W/AUTO DIFF WBC: CPT | Performed by: FAMILY MEDICINE

## 2017-04-13 PROCEDURE — 86850 RBC ANTIBODY SCREEN: CPT | Performed by: FAMILY MEDICINE

## 2017-04-13 PROCEDURE — 93010 ELECTROCARDIOGRAM REPORT: CPT | Performed by: INTERNAL MEDICINE

## 2017-04-13 PROCEDURE — 25010000002 ONDANSETRON PER 1 MG: Performed by: FAMILY MEDICINE

## 2017-04-13 PROCEDURE — 86901 BLOOD TYPING SEROLOGIC RH(D): CPT

## 2017-04-13 PROCEDURE — 87088 URINE BACTERIA CULTURE: CPT | Performed by: FAMILY MEDICINE

## 2017-04-13 PROCEDURE — 99285 EMERGENCY DEPT VISIT HI MDM: CPT

## 2017-04-13 PROCEDURE — 87186 SC STD MICRODIL/AGAR DIL: CPT | Performed by: FAMILY MEDICINE

## 2017-04-13 PROCEDURE — 81001 URINALYSIS AUTO W/SCOPE: CPT | Performed by: FAMILY MEDICINE

## 2017-04-13 PROCEDURE — 87086 URINE CULTURE/COLONY COUNT: CPT | Performed by: FAMILY MEDICINE

## 2017-04-13 PROCEDURE — 85730 THROMBOPLASTIN TIME PARTIAL: CPT | Performed by: FAMILY MEDICINE

## 2017-04-13 PROCEDURE — 93005 ELECTROCARDIOGRAM TRACING: CPT | Performed by: FAMILY MEDICINE

## 2017-04-13 PROCEDURE — 70450 CT HEAD/BRAIN W/O DYE: CPT

## 2017-04-13 PROCEDURE — 86900 BLOOD TYPING SEROLOGIC ABO: CPT

## 2017-04-13 PROCEDURE — 86900 BLOOD TYPING SEROLOGIC ABO: CPT | Performed by: FAMILY MEDICINE

## 2017-04-13 PROCEDURE — 81001 URINALYSIS AUTO W/SCOPE: CPT | Performed by: INTERNAL MEDICINE

## 2017-04-13 RX ORDER — SODIUM CHLORIDE 9 MG/ML
125 INJECTION, SOLUTION INTRAVENOUS CONTINUOUS
Status: DISCONTINUED | OUTPATIENT
Start: 2017-04-13 | End: 2017-04-14

## 2017-04-13 RX ORDER — FUROSEMIDE 20 MG/1
20 TABLET ORAL DAILY
Status: ON HOLD | COMMUNITY
End: 2017-04-18

## 2017-04-13 RX ORDER — ONDANSETRON 2 MG/ML
2 INJECTION INTRAMUSCULAR; INTRAVENOUS ONCE
Status: COMPLETED | OUTPATIENT
Start: 2017-04-13 | End: 2017-04-13

## 2017-04-13 RX ORDER — ONDANSETRON 2 MG/ML
4 INJECTION INTRAMUSCULAR; INTRAVENOUS ONCE
Status: DISCONTINUED | OUTPATIENT
Start: 2017-04-13 | End: 2017-04-13

## 2017-04-13 RX ADMIN — ONDANSETRON HYDROCHLORIDE 2 MG: 2 SOLUTION INTRAMUSCULAR; INTRAVENOUS at 19:30

## 2017-04-13 RX ADMIN — ONDANSETRON HYDROCHLORIDE 2 MG: 2 SOLUTION INTRAMUSCULAR; INTRAVENOUS at 20:27

## 2017-04-13 RX ADMIN — SODIUM CHLORIDE 125 ML/HR: 9 INJECTION, SOLUTION INTRAVENOUS at 21:09

## 2017-04-13 RX ADMIN — HYDROMORPHONE HYDROCHLORIDE 0.5 MG: 1 INJECTION, SOLUTION INTRAMUSCULAR; INTRAVENOUS; SUBCUTANEOUS at 21:09

## 2017-04-13 RX ADMIN — HYDROMORPHONE HYDROCHLORIDE 0.5 MG: 1 INJECTION, SOLUTION INTRAMUSCULAR; INTRAVENOUS; SUBCUTANEOUS at 19:30

## 2017-04-14 ENCOUNTER — ANESTHESIA EVENT (OUTPATIENT)
Dept: PERIOP | Facility: HOSPITAL | Age: 82
End: 2017-04-14

## 2017-04-14 ENCOUNTER — APPOINTMENT (OUTPATIENT)
Dept: GENERAL RADIOLOGY | Facility: HOSPITAL | Age: 82
End: 2017-04-14

## 2017-04-14 ENCOUNTER — ANESTHESIA (OUTPATIENT)
Dept: PERIOP | Facility: HOSPITAL | Age: 82
End: 2017-04-14

## 2017-04-14 PROBLEM — S72.141A CLOSED INTERTROCHANTERIC FRACTURE OF RIGHT HIP (HCC): Status: ACTIVE | Noted: 2017-04-13

## 2017-04-14 LAB
ANION GAP SERPL CALCULATED.3IONS-SCNC: 13 MMOL/L (ref 4–13)
BACTERIA UR QL AUTO: ABNORMAL /HPF
BILIRUB UR QL STRIP: NEGATIVE
BUN BLD-MCNC: 13 MG/DL (ref 5–21)
BUN/CREAT SERPL: 19.1 (ref 7–25)
CALCIUM SPEC-SCNC: 9 MG/DL (ref 8.4–10.4)
CHLORIDE SERPL-SCNC: 96 MMOL/L (ref 98–110)
CLARITY UR: CLEAR
CO2 SERPL-SCNC: 25 MMOL/L (ref 24–31)
COLOR UR: YELLOW
CREAT BLD-MCNC: 0.68 MG/DL (ref 0.5–1.4)
GFR SERPL CREATININE-BSD FRML MDRD: 82 ML/MIN/1.73
GLUCOSE BLD-MCNC: 112 MG/DL (ref 70–100)
GLUCOSE UR STRIP-MCNC: NEGATIVE MG/DL
HGB UR QL STRIP.AUTO: NEGATIVE
HYALINE CASTS UR QL AUTO: ABNORMAL /LPF
KETONES UR QL STRIP: NEGATIVE
LEUKOCYTE ESTERASE UR QL STRIP.AUTO: ABNORMAL
MAGNESIUM SERPL-MCNC: 1.2 MG/DL (ref 1.4–2.2)
NITRITE UR QL STRIP: POSITIVE
PH UR STRIP.AUTO: 5.5 [PH] (ref 5–8)
PHOSPHATE SERPL-MCNC: 4 MG/DL (ref 2.5–4.5)
POTASSIUM BLD-SCNC: 3.8 MMOL/L (ref 3.5–5.3)
PROT UR QL STRIP: NEGATIVE
RBC # UR: ABNORMAL /HPF
REF LAB TEST METHOD: ABNORMAL
SODIUM BLD-SCNC: 134 MMOL/L (ref 135–145)
SP GR UR STRIP: 1.01 (ref 1–1.03)
SQUAMOUS #/AREA URNS HPF: ABNORMAL /HPF
UROBILINOGEN UR QL STRIP: ABNORMAL
WBC UR QL AUTO: ABNORMAL /HPF

## 2017-04-14 PROCEDURE — C1713 ANCHOR/SCREW BN/BN,TIS/BN: HCPCS | Performed by: ORTHOPAEDIC SURGERY

## 2017-04-14 PROCEDURE — 80048 BASIC METABOLIC PNL TOTAL CA: CPT | Performed by: INTERNAL MEDICINE

## 2017-04-14 PROCEDURE — 25010000002 HYDROMORPHONE PER 4 MG: Performed by: INTERNAL MEDICINE

## 2017-04-14 PROCEDURE — 25010000002 MAGNESIUM SULFATE IN D5W 1G/100ML (PREMIX) 10-5 MG/ML-% SOLUTION: Performed by: INTERNAL MEDICINE

## 2017-04-14 PROCEDURE — 25010000002 ONDANSETRON PER 1 MG: Performed by: NURSE ANESTHETIST, CERTIFIED REGISTERED

## 2017-04-14 PROCEDURE — 25010000002 PROPOFOL 10 MG/ML EMULSION: Performed by: NURSE ANESTHETIST, CERTIFIED REGISTERED

## 2017-04-14 PROCEDURE — 84100 ASSAY OF PHOSPHORUS: CPT | Performed by: INTERNAL MEDICINE

## 2017-04-14 PROCEDURE — 76000 FLUOROSCOPY <1 HR PHYS/QHP: CPT

## 2017-04-14 PROCEDURE — 83735 ASSAY OF MAGNESIUM: CPT | Performed by: INTERNAL MEDICINE

## 2017-04-14 PROCEDURE — 25010000002 NEOSTIGMINE PER 0.5 MG: Performed by: NURSE ANESTHETIST, CERTIFIED REGISTERED

## 2017-04-14 PROCEDURE — 73502 X-RAY EXAM HIP UNI 2-3 VIEWS: CPT

## 2017-04-14 PROCEDURE — 0QH606Z INSERTION OF INTRAMEDULLARY INTERNAL FIXATION DEVICE INTO RIGHT UPPER FEMUR, OPEN APPROACH: ICD-10-PCS | Performed by: ORTHOPAEDIC SURGERY

## 2017-04-14 PROCEDURE — 25010000002 FENTANYL CITRATE (PF) 250 MCG/5ML SOLUTION: Performed by: NURSE ANESTHETIST, CERTIFIED REGISTERED

## 2017-04-14 DEVICE — BLD FEM FIX HELI TFN ADV 100MM STRL: Type: IMPLANTABLE DEVICE | Status: FUNCTIONAL

## 2017-04-14 DEVICE — SCRW LK STRDRV TI 5X36MM STRL: Type: IMPLANTABLE DEVICE | Status: FUNCTIONAL

## 2017-04-14 DEVICE — NAIL FEM TFN ADV PROX 130D SHT 11X170MM STRL: Type: IMPLANTABLE DEVICE | Status: FUNCTIONAL

## 2017-04-14 RX ORDER — HYDRALAZINE HYDROCHLORIDE 20 MG/ML
5 INJECTION INTRAMUSCULAR; INTRAVENOUS
Status: DISCONTINUED | OUTPATIENT
Start: 2017-04-14 | End: 2017-04-14 | Stop reason: HOSPADM

## 2017-04-14 RX ORDER — DOCUSATE SODIUM 100 MG/1
100 CAPSULE, LIQUID FILLED ORAL 2 TIMES DAILY PRN
Status: DISCONTINUED | OUTPATIENT
Start: 2017-04-14 | End: 2017-04-14

## 2017-04-14 RX ORDER — WARFARIN SODIUM 3 MG/1
3 TABLET ORAL
Status: DISCONTINUED | OUTPATIENT
Start: 2017-04-14 | End: 2017-04-15

## 2017-04-14 RX ORDER — ONDANSETRON 2 MG/ML
INJECTION INTRAMUSCULAR; INTRAVENOUS AS NEEDED
Status: DISCONTINUED | OUTPATIENT
Start: 2017-04-14 | End: 2017-04-14 | Stop reason: SURG

## 2017-04-14 RX ORDER — NALOXONE HCL 0.4 MG/ML
0.04 VIAL (ML) INJECTION AS NEEDED
Status: DISCONTINUED | OUTPATIENT
Start: 2017-04-14 | End: 2017-04-14 | Stop reason: HOSPADM

## 2017-04-14 RX ORDER — METOPROLOL SUCCINATE 25 MG/1
25 TABLET, EXTENDED RELEASE ORAL
Status: DISCONTINUED | OUTPATIENT
Start: 2017-04-14 | End: 2017-04-18 | Stop reason: HOSPADM

## 2017-04-14 RX ORDER — FAMOTIDINE 10 MG/ML
20 INJECTION, SOLUTION INTRAVENOUS
Status: DISCONTINUED | OUTPATIENT
Start: 2017-04-14 | End: 2017-04-14 | Stop reason: HOSPADM

## 2017-04-14 RX ORDER — LORAZEPAM 2 MG/ML
0.5 INJECTION INTRAMUSCULAR EVERY 6 HOURS PRN
Status: DISCONTINUED | OUTPATIENT
Start: 2017-04-14 | End: 2017-04-16

## 2017-04-14 RX ORDER — SODIUM CHLORIDE 0.9 % (FLUSH) 0.9 %
1-10 SYRINGE (ML) INJECTION AS NEEDED
Status: DISCONTINUED | OUTPATIENT
Start: 2017-04-14 | End: 2017-04-18 | Stop reason: HOSPADM

## 2017-04-14 RX ORDER — ASPIRIN 81 MG/1
81 TABLET ORAL DAILY
Status: DISCONTINUED | OUTPATIENT
Start: 2017-04-14 | End: 2017-04-14

## 2017-04-14 RX ORDER — FLUMAZENIL 0.1 MG/ML
0.2 INJECTION INTRAVENOUS AS NEEDED
Status: DISCONTINUED | OUTPATIENT
Start: 2017-04-14 | End: 2017-04-14 | Stop reason: HOSPADM

## 2017-04-14 RX ORDER — PROMETHAZINE HYDROCHLORIDE 25 MG/1
12.5 TABLET ORAL EVERY 6 HOURS PRN
Status: DISCONTINUED | OUTPATIENT
Start: 2017-04-14 | End: 2017-04-14

## 2017-04-14 RX ORDER — ONDANSETRON 4 MG/1
4 TABLET, ORALLY DISINTEGRATING ORAL EVERY 6 HOURS PRN
Status: DISCONTINUED | OUTPATIENT
Start: 2017-04-14 | End: 2017-04-18 | Stop reason: HOSPADM

## 2017-04-14 RX ORDER — ASPIRIN 81 MG/1
81 TABLET ORAL DAILY
Status: DISCONTINUED | OUTPATIENT
Start: 2017-04-15 | End: 2017-04-18 | Stop reason: HOSPADM

## 2017-04-14 RX ORDER — SODIUM CHLORIDE 0.9 % (FLUSH) 0.9 %
1-10 SYRINGE (ML) INJECTION AS NEEDED
Status: DISCONTINUED | OUTPATIENT
Start: 2017-04-14 | End: 2017-04-14 | Stop reason: HOSPADM

## 2017-04-14 RX ORDER — SODIUM CHLORIDE, SODIUM LACTATE, POTASSIUM CHLORIDE, CALCIUM CHLORIDE 600; 310; 30; 20 MG/100ML; MG/100ML; MG/100ML; MG/100ML
100 INJECTION, SOLUTION INTRAVENOUS CONTINUOUS
Status: DISCONTINUED | OUTPATIENT
Start: 2017-04-14 | End: 2017-04-14

## 2017-04-14 RX ORDER — IPRATROPIUM BROMIDE AND ALBUTEROL SULFATE 2.5; .5 MG/3ML; MG/3ML
3 SOLUTION RESPIRATORY (INHALATION) ONCE AS NEEDED
Status: DISCONTINUED | OUTPATIENT
Start: 2017-04-14 | End: 2017-04-14 | Stop reason: HOSPADM

## 2017-04-14 RX ORDER — GLYCOPYRROLATE 0.2 MG/ML
INJECTION INTRAMUSCULAR; INTRAVENOUS AS NEEDED
Status: DISCONTINUED | OUTPATIENT
Start: 2017-04-14 | End: 2017-04-14 | Stop reason: SURG

## 2017-04-14 RX ORDER — ONDANSETRON 2 MG/ML
4 INJECTION INTRAMUSCULAR; INTRAVENOUS EVERY 6 HOURS PRN
Status: DISCONTINUED | OUTPATIENT
Start: 2017-04-14 | End: 2017-04-18 | Stop reason: HOSPADM

## 2017-04-14 RX ORDER — FENTANYL CITRATE 50 UG/ML
INJECTION, SOLUTION INTRAMUSCULAR; INTRAVENOUS AS NEEDED
Status: DISCONTINUED | OUTPATIENT
Start: 2017-04-14 | End: 2017-04-14 | Stop reason: SURG

## 2017-04-14 RX ORDER — PROMETHAZINE HYDROCHLORIDE 25 MG/ML
12.5 INJECTION, SOLUTION INTRAMUSCULAR; INTRAVENOUS EVERY 6 HOURS PRN
Status: DISCONTINUED | OUTPATIENT
Start: 2017-04-14 | End: 2017-04-14

## 2017-04-14 RX ORDER — ONDANSETRON 2 MG/ML
4 INJECTION INTRAMUSCULAR; INTRAVENOUS AS NEEDED
Status: DISCONTINUED | OUTPATIENT
Start: 2017-04-14 | End: 2017-04-14 | Stop reason: HOSPADM

## 2017-04-14 RX ORDER — HYDROCODONE BITARTRATE AND ACETAMINOPHEN 7.5; 325 MG/1; MG/1
1 TABLET ORAL EVERY 4 HOURS PRN
Status: DISCONTINUED | OUTPATIENT
Start: 2017-04-14 | End: 2017-04-16

## 2017-04-14 RX ORDER — MEPERIDINE HYDROCHLORIDE 25 MG/ML
12.5 INJECTION INTRAMUSCULAR; INTRAVENOUS; SUBCUTANEOUS
Status: DISCONTINUED | OUTPATIENT
Start: 2017-04-14 | End: 2017-04-14 | Stop reason: HOSPADM

## 2017-04-14 RX ORDER — PHENYLEPHRINE HCL IN 0.9% NACL 0.8MG/10ML
SYRINGE (ML) INTRAVENOUS AS NEEDED
Status: DISCONTINUED | OUTPATIENT
Start: 2017-04-14 | End: 2017-04-14 | Stop reason: SURG

## 2017-04-14 RX ORDER — LABETALOL HYDROCHLORIDE 5 MG/ML
5 INJECTION, SOLUTION INTRAVENOUS
Status: DISCONTINUED | OUTPATIENT
Start: 2017-04-14 | End: 2017-04-14 | Stop reason: HOSPADM

## 2017-04-14 RX ORDER — SODIUM CHLORIDE, SODIUM LACTATE, POTASSIUM CHLORIDE, CALCIUM CHLORIDE 600; 310; 30; 20 MG/100ML; MG/100ML; MG/100ML; MG/100ML
20 INJECTION, SOLUTION INTRAVENOUS CONTINUOUS
Status: DISCONTINUED | OUTPATIENT
Start: 2017-04-14 | End: 2017-04-17

## 2017-04-14 RX ORDER — MAGNESIUM HYDROXIDE 1200 MG/15ML
LIQUID ORAL AS NEEDED
Status: DISCONTINUED | OUTPATIENT
Start: 2017-04-14 | End: 2017-04-14 | Stop reason: HOSPADM

## 2017-04-14 RX ORDER — LIDOCAINE HYDROCHLORIDE 20 MG/ML
INJECTION, SOLUTION INFILTRATION; PERINEURAL AS NEEDED
Status: DISCONTINUED | OUTPATIENT
Start: 2017-04-14 | End: 2017-04-14 | Stop reason: SURG

## 2017-04-14 RX ORDER — MORPHINE SULFATE 2 MG/ML
2 INJECTION, SOLUTION INTRAMUSCULAR; INTRAVENOUS AS NEEDED
Status: DISCONTINUED | OUTPATIENT
Start: 2017-04-14 | End: 2017-04-14 | Stop reason: HOSPADM

## 2017-04-14 RX ORDER — METOCLOPRAMIDE HYDROCHLORIDE 5 MG/ML
5 INJECTION INTRAMUSCULAR; INTRAVENOUS
Status: DISCONTINUED | OUTPATIENT
Start: 2017-04-14 | End: 2017-04-14 | Stop reason: HOSPADM

## 2017-04-14 RX ORDER — PROPOFOL 10 MG/ML
VIAL (ML) INTRAVENOUS AS NEEDED
Status: DISCONTINUED | OUTPATIENT
Start: 2017-04-14 | End: 2017-04-14 | Stop reason: SURG

## 2017-04-14 RX ORDER — DOCUSATE SODIUM 100 MG/1
100 CAPSULE, LIQUID FILLED ORAL 2 TIMES DAILY
Status: DISCONTINUED | OUTPATIENT
Start: 2017-04-14 | End: 2017-04-18 | Stop reason: HOSPADM

## 2017-04-14 RX ORDER — ROCURONIUM BROMIDE 10 MG/ML
INJECTION, SOLUTION INTRAVENOUS AS NEEDED
Status: DISCONTINUED | OUTPATIENT
Start: 2017-04-14 | End: 2017-04-14 | Stop reason: SURG

## 2017-04-14 RX ORDER — ONDANSETRON 4 MG/1
4 TABLET, FILM COATED ORAL EVERY 6 HOURS PRN
Status: DISCONTINUED | OUTPATIENT
Start: 2017-04-14 | End: 2017-04-18 | Stop reason: HOSPADM

## 2017-04-14 RX ORDER — PROMETHAZINE HYDROCHLORIDE 12.5 MG/1
12.5 SUPPOSITORY RECTAL EVERY 6 HOURS PRN
Status: DISCONTINUED | OUTPATIENT
Start: 2017-04-14 | End: 2017-04-14

## 2017-04-14 RX ORDER — BISACODYL 5 MG/1
10 TABLET, DELAYED RELEASE ORAL DAILY PRN
Status: DISCONTINUED | OUTPATIENT
Start: 2017-04-14 | End: 2017-04-18 | Stop reason: HOSPADM

## 2017-04-14 RX ADMIN — AZTREONAM 2 G: 2 INJECTION, POWDER, FOR SOLUTION INTRAMUSCULAR; INTRAVENOUS at 05:33

## 2017-04-14 RX ADMIN — SODIUM CHLORIDE 125 ML/HR: 9 INJECTION, SOLUTION INTRAVENOUS at 06:39

## 2017-04-14 RX ADMIN — HYDROMORPHONE HYDROCHLORIDE 1 MG: 1 INJECTION, SOLUTION INTRAMUSCULAR; INTRAVENOUS; SUBCUTANEOUS at 10:51

## 2017-04-14 RX ADMIN — FENTANYL CITRATE 50 MCG: 50 INJECTION INTRAMUSCULAR; INTRAVENOUS at 13:55

## 2017-04-14 RX ADMIN — GLYCOPYRROLATE 0.4 MG: 0.2 INJECTION, SOLUTION INTRAMUSCULAR; INTRAVENOUS at 14:14

## 2017-04-14 RX ADMIN — LIDOCAINE HYDROCHLORIDE 100 MG: 20 INJECTION, SOLUTION INFILTRATION; PERINEURAL at 13:23

## 2017-04-14 RX ADMIN — Medication 80 MCG: at 13:32

## 2017-04-14 RX ADMIN — HYDROMORPHONE HYDROCHLORIDE 1 MG: 1 INJECTION, SOLUTION INTRAMUSCULAR; INTRAVENOUS; SUBCUTANEOUS at 04:47

## 2017-04-14 RX ADMIN — ROCURONIUM BROMIDE 30 MG: 10 INJECTION INTRAVENOUS at 13:23

## 2017-04-14 RX ADMIN — WARFARIN SODIUM 3 MG: 3 TABLET ORAL at 17:13

## 2017-04-14 RX ADMIN — PROPOFOL 100 MG: 10 INJECTION, EMULSION INTRAVENOUS at 13:23

## 2017-04-14 RX ADMIN — AZTREONAM 2 G: 2 INJECTION, POWDER, FOR SOLUTION INTRAMUSCULAR; INTRAVENOUS at 13:41

## 2017-04-14 RX ADMIN — METOPROLOL SUCCINATE 25 MG: 25 TABLET, FILM COATED, EXTENDED RELEASE ORAL at 11:29

## 2017-04-14 RX ADMIN — SODIUM CHLORIDE, POTASSIUM CHLORIDE, SODIUM LACTATE AND CALCIUM CHLORIDE 50 ML/HR: 600; 310; 30; 20 INJECTION, SOLUTION INTRAVENOUS at 17:14

## 2017-04-14 RX ADMIN — SODIUM CHLORIDE, POTASSIUM CHLORIDE, SODIUM LACTATE AND CALCIUM CHLORIDE 100 ML/HR: 600; 310; 30; 20 INJECTION, SOLUTION INTRAVENOUS at 12:27

## 2017-04-14 RX ADMIN — DOCUSATE SODIUM 100 MG: 100 CAPSULE ORAL at 17:13

## 2017-04-14 RX ADMIN — MAGNESIUM SULFATE HEPTAHYDRATE 1 G: 1 INJECTION, SOLUTION INTRAVENOUS at 17:13

## 2017-04-14 RX ADMIN — Medication 80 MCG: at 14:09

## 2017-04-14 RX ADMIN — FAMOTIDINE 20 MG: 10 INJECTION, SOLUTION INTRAVENOUS at 12:28

## 2017-04-14 RX ADMIN — FENTANYL CITRATE 100 MCG: 50 INJECTION INTRAMUSCULAR; INTRAVENOUS at 13:20

## 2017-04-14 RX ADMIN — ONDANSETRON HYDROCHLORIDE 4 MG: 2 SOLUTION INTRAMUSCULAR; INTRAVENOUS at 14:14

## 2017-04-14 RX ADMIN — HYDROCODONE BITARTRATE AND ACETAMINOPHEN 1 TABLET: 7.5; 325 TABLET ORAL at 20:55

## 2017-04-14 RX ADMIN — AZTREONAM 1 G: 1 INJECTION, POWDER, FOR SOLUTION INTRAMUSCULAR; INTRAVENOUS at 21:09

## 2017-04-14 RX ADMIN — Medication 3 MG: at 14:14

## 2017-04-14 NOTE — ED PROVIDER NOTES
Subjective   Patient is a 84 y.o. female presenting with fall.   Fall   Mechanism of injury: fall    Injury location:  Leg  Leg injury location:  R hip  Incident location:  [facility]  Time since incident:  2 hours  Associated symptoms: nausea and vomiting    Associated symptoms: no abdominal pain, no back pain, no blindness, no chest pain, no difficulty breathing, no headaches, no hearing loss, no loss of consciousness, no neck pain and no seizures        Review of Systems   HENT: Negative for hearing loss.    Eyes: Negative for blindness.   Cardiovascular: Negative for chest pain.   Gastrointestinal: Positive for nausea and vomiting. Negative for abdominal pain.   Musculoskeletal: Negative for back pain and neck pain.   Neurological: Negative for seizures, loss of consciousness and headaches.       Past Medical History:   Diagnosis Date   • Anxiety    • Arthritis    • Clostridium difficile infection    • Concussion    • COPD (chronic obstructive pulmonary disease)    • Fall    • History of transfusion    • Hypertension    • Inappropriate vasopressin secretion syndrome    • Irregular heart rhythm    • TIA (transient ischemic attack)    • Ulcerative colitis        Allergies   Allergen Reactions   • Albuterol    • Augmentin  [Amoxicillin-Pot Clavulanate]    • Cefdinir    • Cefprozil    • Doxycycline    • Erythromycin    • Moxifloxacin    • Sulfa Antibiotics        Past Surgical History:   Procedure Laterality Date   • CHOLECYSTECTOMY     • EYE SURGERY     • HIP TROCANTERIC NAILING WITH INTRAMEDULLARY HIP SCREW Left 2/15/2017    Procedure: HIP TROCANTERIC NAILING SHORT WITH INTRAMEDULLARY HIP SCREW;  Surgeon: Pastor Lee MD;  Location: [facility];  Service:    • REPLACEMENT TOTAL KNEE BILATERAL         No family history on file.    Social History     Social History   • Marital status: Single     Spouse name: N/A   • Number of children: N/A   • Years of education: N/A     Social History Main Topics   • Smoking status:  Never Smoker   • Smokeless tobacco: Not on file   • Alcohol use No   • Drug use: No   • Sexual activity: Defer     Other Topics Concern   • Not on file     Social History Narrative           Objective   Physical Exam   Constitutional: She is oriented to person, place, and time. She appears well-developed and well-nourished.   HENT:   Head: Normocephalic.   Nose: Nose normal.   Mouth/Throat: Oropharynx is clear and moist.   Eyes: Conjunctivae and EOM are normal. Pupils are equal, round, and reactive to light.   Neck: Normal range of motion. Neck supple. No JVD present. No thyromegaly present.   Cardiovascular: Normal rate, regular rhythm, normal heart sounds and intact distal pulses.    Pulmonary/Chest: Effort normal and breath sounds normal.   Abdominal: Soft. Bowel sounds are normal. She exhibits no distension and no mass. There is no tenderness. There is no rebound and no guarding.   Musculoskeletal:        Right hip: She exhibits decreased range of motion, tenderness and bony tenderness.        Legs:  Lymphadenopathy:     She has no cervical adenopathy.   Neurological: She is alert and oriented to person, place, and time.   Skin: Skin is warm and dry. No rash noted. No erythema.   Psychiatric: She has a normal mood and affect. Her behavior is normal. Judgment and thought content normal.   Nursing note and vitals reviewed.      Procedures        CT Head Without Contrast   Final Result   1. No hemorrhage, edema or mass effect.   2. Moderate atrophy with associated ventricular prominence.   3. Extensive low density in the white matter is nonspecific and most   likely due to chronic small vessel disease.       The full report of this exam was immediately signed and available to the   emergency room. The patient is currently in the emergency room.           This report was finalized on 04/13/2017 20:29 by Dr. Irving Charles MD.      XR Chest 1 View   Final Result   Chronic appearing changes. No acute appearing  infiltrate.           This report was finalized on 04/13/2017 20:20 by Dr. Irving Charles MD.      XR Hip With or Without Pelvis 2 - 3 View Right   Final Result   Proximal right femur fracture, as described.   This report was finalized on 04/13/2017 20:16 by Dr. Irving Charles MD.            ED Course  ED Course                  MDM      Final diagnoses:   Hip fracture, right, closed, initial encounter            Tom Espinosa MD  04/14/17 0501

## 2017-04-14 NOTE — PROGRESS NOTES
Discharge Planning Assessment  [facility]     Patient Name: Tracee Neal  MRN: [id number]  Today's Date: 4/14/2017    Admit Date: 4/13/2017          Discharge Needs Assessment       04/14/17 1017    Living Environment    Lives With facility resident    Living Arrangements other (see comments)   Skilled nursing facility - [facility]    Primary Care Provided By other (see comments)   Staff at [facility]    Discharge Needs Assessment    Outpatient/Agency/Support Group Needs skilled nursing facility (specify)    Equipment Currently Used at Home other (see comments)   All needed DME provided at [facility]    Equipment Needed After Discharge none    Discharge Facility/Level Of Care Needs nursing facility, skilled    Transportation Available ambulance    Discharge Planning Comments Patient is from [facility]. TOÑITO called Richelle in admissions at [facility]. Richelle advised that she is actually off work today but she will forward a message to Romina at [facility] and Romina will contact TOÑITO re level of care and bed hold status. Will update chart when info is available.            Discharge Plan     None        Discharge Placement     No information found                Demographic Summary     None            Functional Status     None            Psychosocial     None            Abuse/Neglect     None            Legal     None            Substance Abuse     None            Patient Forms     None          NOREEN GaoW

## 2017-04-14 NOTE — PLAN OF CARE
Problem: Patient Care Overview (Adult)  Goal: Plan of Care Review  Outcome: Ongoing (interventions implemented as appropriate)    04/14/17 0522   Coping/Psychosocial Response Interventions   Plan Of Care Reviewed With patient   Patient Care Overview   Progress no change   Outcome Evaluation   Outcome Summary/Follow up Plan vss; c/o right hip pain; prn pain med offers relief; surgery today; safety maintained       Goal: Adult Individualization and Mutuality  Outcome: Ongoing (interventions implemented as appropriate)  Goal: Discharge Needs Assessment  Outcome: Ongoing (interventions implemented as appropriate)    Problem: Fractured Hip (Adult)  Goal: Signs and Symptoms of Listed Potential Problems Will be Absent or Manageable (Fractured Hip)  Outcome: Ongoing (interventions implemented as appropriate)    Problem: Pain, Acute (Adult)  Goal: Identify Related Risk Factors and Signs and Symptoms  Outcome: Ongoing (interventions implemented as appropriate)  Goal: Acceptable Pain Control/Comfort Level  Outcome: Ongoing (interventions implemented as appropriate)

## 2017-04-14 NOTE — PLAN OF CARE
Problem: Patient Care Overview (Adult)  Goal: Plan of Care Review  Outcome: Ongoing (interventions implemented as appropriate)    04/14/17 1505   Coping/Psychosocial Response Interventions   Plan Of Care Reviewed With patient   Patient Care Overview   Progress improving   Outcome Evaluation   Outcome Summary/Follow up Plan awake and denies pain Meets criteria to discharge from PACU         Problem: Perioperative Period (Adult)  Goal: Signs and Symptoms of Listed Potential Problems Will be Absent or Manageable (Perioperative Period)  Outcome: Ongoing (interventions implemented as appropriate)

## 2017-04-14 NOTE — PROGRESS NOTES
Patient was admitted earlier this morning by Dr. Bravo after sustaining a fall which resulted in a right intertrochanteric hip fracture.  Patient has already been to the operating room with Dr. Locke of orthopedic surgery for repair of this fracture, and postoperatively he appears to be doing well.  She states that her pain is currently adequately controlled.  Orthopedic started warfarin for DVT prophylaxis and will monitor her PT/INR closely.  We will repeat CBC in the morning tomorrow.    She also appears to have a urinary tract infection with urine culture revealing Escherichia coli.  She also has allergies to penicillin, cephalosporin, in addition to fluoroquinolones.  She was given a dose of his Azactam earlier on during this hospitalization and we will continued this antimicrobial therapy for the time being.    We will also replace magnesium.  Repeat magnesium level in the morning tomorrow.  Beta blocker was continued preoperatively.  Physical therapy and occupational therapy has been ordered.  We will monitor blood pressures closely.  Disposition planning ongoing.    Please see Dr. Bravo's history and physical performed earlier this morning for additional details.

## 2017-04-14 NOTE — OP NOTE
DATE OF PROCEDURE:  04/14/2017     PREOPERATIVE DIAGNOSIS:  Intertrochanteric fracture right hip.     POSTOPERATIVE DIAGNOSIS:  Intertrochanteric fracture right hip.     PROCEDURE PERFORMED:  Trochanteric fixation nailing right hip.     SURGEON:  Gael Locke MD     INDICATIONS:  This patient fell, sustaining an intertrochanteric fracture of the right hip. It is felt that stabilization was indicated. She has had a previous fracture on the left that has healed appropriately. The patient and family understand the risk of infection, bleeding, and nonunion, and asked me to proceed. At surgery the Synthes system was used, utilizing an 11 mm x 170 mm trochanteric fixation nail with a 100 mm blade plate with a 36 mm static distal locking screw.     DESCRIPTION OF PROCEDURE:  After an adequate level of general anesthesia, patient was positioned on the fracture table with the extremity in neutral rotation with slight traction. This led to an excellent reduction of the fracture. The right hip was prepped and draped in the usual fashion. A stab wound was made at the tip of the greater trochanter, and carried down and through the deep fascia. A guidewire was inserted through the tip of the trochanter, down the shaft of the femur. Reaming over the guidewire was then carried out. The nail was then impacted over the guidewire, down to the appropriate resting point, and the guidewire was removed. Using an Outrigger, a second guidewire was inserted up the neck and into the head of the femur, verifying the position in 2 planes with the fluoroscope. Reaming over this guidewire was carried out. The blade plate was impacted into position and then locked into position. The guidewire was then removed. Using the Outrigger, a stab wound was made and the distal locking screw was placed without difficulty. The Outrigger was then removed and the construct was visualized in 2 planes with the fluoroscope, and the fracture appeared  stable and near-anatomic. The wounds were then irrigated. The deep layers were closed with Vicryl suture, followed by staples on the skin. A dressing was then applied. The patient tolerated procedure well and was transferred to [facility] in stable condition.     cc:                   JUN GUDINO/[id number]  D:  04/14/2017 15:20:44(Eastern Time)  T:  04/14/2017 17:54:00(Eastern Time)  Voice ID:  [id number]/Document ID:  [id number]

## 2017-04-14 NOTE — PLAN OF CARE
Problem: Patient Care Overview (Adult)  Goal: Plan of Care Review  Outcome: Ongoing (interventions implemented as appropriate)    04/14/17 7972   Coping/Psychosocial Response Interventions   Plan Of Care Reviewed With patient   Patient Care Overview   Progress no change   Outcome Evaluation   Outcome Summary/Follow up Plan pt back in room from recovery. pt is very drowsy and has not c/o of pain. pt is currently refusing to change positioning but will try to reposition her again.        Goal: Adult Individualization and Mutuality  Outcome: Ongoing (interventions implemented as appropriate)  Goal: Discharge Needs Assessment  Outcome: Ongoing (interventions implemented as appropriate)    Problem: Fractured Hip (Adult)  Goal: Signs and Symptoms of Listed Potential Problems Will be Absent or Manageable (Fractured Hip)  Outcome: Ongoing (interventions implemented as appropriate)    Problem: Pain, Acute (Adult)  Goal: Identify Related Risk Factors and Signs and Symptoms  Outcome: Outcome(s) achieved Date Met:  04/14/17  Goal: Acceptable Pain Control/Comfort Level  Outcome: Ongoing (interventions implemented as appropriate)    Problem: Perioperative Period (Adult)  Goal: Signs and Symptoms of Listed Potential Problems Will be Absent or Manageable (Perioperative Period)  Outcome: Ongoing (interventions implemented as appropriate)

## 2017-04-14 NOTE — ANESTHESIA POSTPROCEDURE EVALUATION
Patient: Tracee Neal    Procedure Summary     Date Anesthesia Start Anesthesia Stop Room / Location    04/14/17 1319 1437 [facility] OR 11 / [facility] OR       Procedure Diagnosis Surgeon Provider    HIP TROCANTERIC NAILING SHORT WITH INTRAMEDULLARY HIP SCREW (Right Hip) No diagnosis on file. MD Say Espinal CRNA          Anesthesia Type: general  Last vitals  /71 (04/14/17 1611)    Temp 97.6 °F (36.4 °C) (04/14/17 1611)    Pulse 88 (04/14/17 1611)   Resp 16 (04/14/17 1611)    SpO2 98 % (04/14/17 1611)      Post Anesthesia Care and Evaluation    Patient location during evaluation: PACU  Patient participation: complete - patient participated  Level of consciousness: awake and alert  Pain management: adequate  Airway patency: patent  Anesthetic complications: No anesthetic complications    Cardiovascular status: acceptable  Respiratory status: acceptable  Hydration status: acceptable

## 2017-04-14 NOTE — CONSULTS
Consult  [facility] [place] [facility]      Tracee Neal (4/15/1932)  4/14/2017    Reason for Consult: Right hip fracture  Requesting Physician: Isaac Felipe MD      CHIEF COMPLAINT:  Right hip pain    History Obtained From: patient     HISTORY OF PRESENT ILLNESS:                The patient is a 84 y.o. female who presents with above chief complaint. The patient had a fall yesterday at the nursing home where she lives.  She was found on the floor with her hip in external rotation.  She was unable to ambulate on the right lower extremity.  She was brought to [facility] ER and on xray was found to have a right intertrochanteric hip fracture.  She has been admitted to the hospital and orthopedics was consulted for definitive care of this fracture.  The patient has pain in the right hip with any movement.  Her hip pain is controlled with narcotic pain medications.  The patient recently underwent a left trochanteric fixation nailing by Dr. Lee.     Orthopedics was consulted on this patient.    Past Medical History:    Past Medical History:   Diagnosis Date   • Anxiety    • Arthritis    • Clostridium difficile infection    • Concussion    • Fall    • History of transfusion    • Hypertension    • Inappropriate vasopressin secretion syndrome    • Irregular heart rhythm    • TIA (transient ischemic attack)    • Ulcerative colitis        Past Surgical History:    Past Surgical History:   Procedure Laterality Date   • CHOLECYSTECTOMY     • EYE SURGERY     • HIP TROCANTERIC NAILING WITH INTRAMEDULLARY HIP SCREW Left 2/15/2017    Procedure: HIP TROCANTERIC NAILING SHORT WITH INTRAMEDULLARY HIP SCREW;  Surgeon: Pastor Lee MD;  Location: [facility];  Service:    • JOINT REPLACEMENT      BILATERAL KNEES AND LEFT HIP   • REPLACEMENT TOTAL KNEE BILATERAL         Current Medications:     Current Facility-Administered Medications:   •  aztreonam (AZACTAM) 2 g/100 mL 0.9% NS (mbp), 2 g, Intravenous,  Q8H, Albert Bravo MD, 2 g at 04/14/17 0533  •  HYDROmorphone (DILAUDID) injection 1 mg, 1 mg, Intravenous, Q4H PRN, Albert Bravo MD, 1 mg at 04/14/17 0447  •  LORazepam (ATIVAN) injection 0.5 mg, 0.5 mg, Intravenous, Q6H PRN, Albert Bravo MD  •  promethazine (PHENERGAN) tablet 12.5 mg, 12.5 mg, Oral, Q6H PRN **OR** promethazine (PHENERGAN) injection 12.5 mg, 12.5 mg, Intramuscular, Q6H PRN **OR** promethazine (PHENERGAN) suppository 12.5 mg, 12.5 mg, Rectal, Q6H PRN, Albert Bravo MD  •  sodium chloride 0.9 % flush 1-10 mL, 1-10 mL, Intravenous, PRN, Albert Bravo MD  •  sodium chloride 0.9 % infusion, 125 mL/hr, Intravenous, Continuous, Tom sEpinosa MD, Last Rate: 125 mL/hr at 04/14/17 0639, 125 mL/hr at 04/14/17 0639    Allergies:  Albuterol; Augmentin  [amoxicillin-pot clavulanate]; Cefdinir; Cefprozil; Doxycycline; Erythromycin; Moxifloxacin; and Sulfa antibiotics    Social History:   Social History     Social History   • Marital status: Single     Spouse name: N/A   • Number of children: N/A   • Years of education: N/A     Social History Main Topics   • Smoking status: Never Smoker   • Smokeless tobacco: None   • Alcohol use No   • Drug use: No   • Sexual activity: Defer     Other Topics Concern   • None     Social History Narrative   • None       Family History:   History reviewed. No pertinent family history.    REVIEW OF SYSTEMS:    All systems were reviewed and negative except for:  Musculoskeletal: positive for  bone pain, joint pain, joint swelling, muscle pain and See HPI    PHYSICAL EXAM:        General Appearance:    Alert, cooperative, in no acute distress, somnolent on exam, but wakes to verbal stimulus.   Head:    Normocephalic, without obvious abnormality, atraumatic   Eyes:            Vision intact, EOM intact     Ears:    Ears appear intact with no abnormalities noted   Neck:   No adenopathy, supple, trachea midline, no thyromegaly   Back:     No  kyphosis present, no scoliosis present, no skin lesions,      erythema or scars, no tenderness to palpation,   range of motion normal   Lungs:     Clear, respirations regular, even and unlabored    Heart:    Regular rhythm and normal rate, no edema   Chest Wall:    No abnormalities observed   Abdomen:     Normal bowel sounds, no masses, no organomegaly, soft        non-tender, non-distended, no guarding   Rectal:     Deferred   Extremities:   Severe pain with ROM of the right hip.  Tender to palpation.  Otherwise Moves all extremities well, no edema, no cyanosis, no redness   Pulses:   Pulses palpable and equal bilaterally   Skin:   No bleeding, bruising or rash   Lymph nodes:   No palpable adenopathy   Neurologic:   Cranial nerves 2 - 12 grossly intact, alert and oriented times 3.         DATA:    Lab Results (last 24 hours)     Procedure Component Value Units Date/Time    CBC & Differential [[id number]] Collected:  04/1934    Specimen:  Blood Updated:  04/13/17 2000    Narrative:       The following orders were created for panel order CBC & Differential.  Procedure                               Abnormality         Status                     ---------                               -----------         ------                     CBC Auto Differential[[id number]]         Abnormal            Final result                 Please view results for these tests on the individual orders.    CBC Auto Differential [[id number]]  (Abnormal) Collected:  04/1934    Specimen:  Blood from Arm, Left Updated:  04/13/17 2000     WBC 10.40 10*3/mm3      RBC 3.74 (L) 10*6/mm3      Hemoglobin 12.2 g/dL      Hematocrit 35.4 (L) %      MCV 94.7 fL      MCH 32.6 (H) pg      MCHC 34.5 g/dL      RDW 13.8 %      RDW-SD 47.4 fl      MPV 10.6 fL      Platelets 266 10*3/mm3      Neutrophil % 76.0 %      Lymphocyte % 13.4 (L) %      Monocyte % 7.4 %      Eosinophil % 1.3 %      Basophil % 0.3 %      Immature Grans % 1.6 %      Neutrophils,  Absolute 7.91 10*3/mm3      Lymphocytes, Absolute 1.39 10*3/mm3      Monocytes, Absolute 0.77 10*3/mm3      Eosinophils, Absolute 0.13 10*3/mm3      Basophils, Absolute 0.03 10*3/mm3      Immature Grans, Absolute 0.17 (H) 10*3/mm3     Comprehensive Metabolic Panel [[id number]]  (Abnormal) Collected:  04/1934    Specimen:  Blood from Arm, Left Updated:  04/13/17 2008     Glucose 100 mg/dL      BUN 15 mg/dL      Creatinine 0.85 mg/dL      Sodium 131 (L) mmol/L      Potassium 3.6 mmol/L      Chloride 94 (L) mmol/L      CO2 24.0 mmol/L      Calcium 9.4 mg/dL      Total Protein 6.7 g/dL      Albumin 3.70 g/dL      ALT (SGPT) 20 U/L      AST (SGOT) 23 U/L      Alkaline Phosphatase 93 U/L      Total Bilirubin 0.6 mg/dL      eGFR Non African Amer 64 mL/min/1.73      Globulin 3.0 gm/dL      A/G Ratio 1.2 g/dL      BUN/Creatinine Ratio 17.6     Anion Gap 13.0 mmol/L     Narrative:       The MDRD GFR formula is only valid for adults with stable renal function between ages 18 and 70.    Protime-INR [[id number]]  (Normal) Collected:  04/1934    Specimen:  Blood from Arm, Left Updated:  04/13/17 2013     Protime 12.8 Seconds      INR 0.94    aPTT [[id number]]  (Normal) Collected:  04/1934    Specimen:  Blood from Arm, Left Updated:  04/13/17 2013     PTT 26.6 seconds     Urinalysis With / Culture If Indicated [[id number]]  (Abnormal) Collected:  04/13/17 2129    Specimen:  Urine from Urine, Catheter Updated:  04/13/17 2207     Color, UA Yellow     Appearance, UA Clear     pH, UA <=5.0     Specific Gravity, UA 1.021     Glucose, UA Negative     Ketones, UA Trace (A)     Bilirubin, UA Negative     Blood, UA Negative     Protein, UA Negative     Leuk Esterase, UA Trace (A)     Nitrite, UA Positive (A)     Urobilinogen, UA 0.2 E.U./dL    Urinalysis, Microscopic Only [[id number]]  (Abnormal) Collected:  04/13/17 2129    Specimen:  Urine from Urine, Catheter Updated:  04/13/17 2203     RBC, UA 0-2 (A) /HPF      WBC, UA 6-12 (A)  /HPF      Bacteria, UA 3+ (A) /HPF      Squamous Epithelial Cells, UA 0-2 /HPF      Hyaline Casts, UA None Seen /LPF      Methodology Manual Light Microscopy    Urinalysis With / Microscopic If Indicated [[id number]]  (Abnormal) Collected:  04/13/17 2346    Specimen:  Urine from Urine, Catheter Updated:  04/14/17 0006     Color, UA Yellow     Appearance, UA Clear     pH, UA 5.5     Specific Gravity, UA 1.015     Glucose, UA Negative     Ketones, UA Negative     Bilirubin, UA Negative     Blood, UA Negative     Protein, UA Negative     Leuk Esterase, UA Moderate (2+) (A)     Nitrite, UA Positive (A)     Urobilinogen, UA 0.2 E.U./dL    Urinalysis, Microscopic Only [[id number]]  (Abnormal) Collected:  04/13/17 2346    Specimen:  Urine from Urine, Catheter Updated:  04/14/17 0006     RBC, UA 3-5 (A) /HPF      WBC, UA 31-50 (A) /HPF      Bacteria, UA 4+ (A) /HPF      Squamous Epithelial Cells, UA None Seen /HPF      Hyaline Casts, UA 3-6 /LPF      Methodology Automated Microscopy    Urine Culture [[id number]]  (Abnormal) Collected:  04/13/17 2129    Specimen:  Urine from Urine, Catheter Updated:  04/14/17 0629     Urine Culture >100,000 CFU/mL Escherichia coli (A)    Basic Metabolic Panel [[id number]]  (Abnormal) Collected:  04/14/17 0602    Specimen:  Blood Updated:  04/14/17 0702     Glucose 112 (H) mg/dL      BUN 13 mg/dL      Creatinine 0.68 mg/dL      Sodium 134 (L) mmol/L      Potassium 3.8 mmol/L      Chloride 96 (L) mmol/L      CO2 25.0 mmol/L      Calcium 9.0 mg/dL      eGFR Non African Amer 82 mL/min/1.73      BUN/Creatinine Ratio 19.1     Anion Gap 13.0 mmol/L     Narrative:       The MDRD GFR formula is only valid for adults with stable renal function between ages 18 and 70.    Magnesium [[id number]]  (Abnormal) Collected:  04/14/17 0602    Specimen:  Blood Updated:  04/14/17 0702     Magnesium 1.2 (L) mg/dL     Phosphorus [[id number]]  (Normal) Collected:  04/14/17 0602    Specimen:  Blood Updated:  04/14/17 0702      Phosphorus 4.0 mg/dL           Radiology:   Imaging Results (last 7 days)     Procedure Component Value Units Date/Time    XR Hip With or Without Pelvis 2 - 3 View Right [[id number]] Collected:  04/13/17 2015     Updated:  04/13/17 2019    Narrative:       EXAMINATION:  XR HIP W OR WO PELVIS 2-3 VIEW RIGHT-  4/13/2017 7:57 PM  CDT     HISTORY: Right hip fracture.     COMPARISON: 02/14/2017.     TECHNIQUE: AP and frog-leg views of the hip were supplemented with an AP  image of the pelvis.     FINDINGS: There is an intertrochanteric fracture of the right femoral  neck with varus angulation. A component of the fracture extends into the  proximal shaft of the femur medially. The lesser trochanter appears to  be a separate fragment. There has been prior left hip fracture repair  with with hardware. The bones are demineralized. There are degenerative  changes of the lumbar spine.       Impression:       Proximal right femur fracture, as described.  This report was finalized on 04/13/2017 20:16 by Dr. Irving Charles MD.    XR Chest 1 View [[id number]] Collected:  04/13/17 2020     Updated:  04/13/17 2024    Narrative:       EXAMINATION:  XR CHEST 1 VW-  4/13/2017 7:57 PM CDT     HISTORY: The patient fell.     COMPARISON: 02/14/2017.     FINDINGS:  There is poor inspiration. Coarse markings and bronchial wall  thickening remain stable. There is cardiomegaly. There is no CHF. There  is atheromatous disease of the thoracic aorta.       Impression:       Chronic appearing changes. No acute appearing infiltrate.        This report was finalized on 04/13/2017 20:20 by Dr. Irving Charles MD.    CT Head Without Contrast [[id number]] Collected:  04/13/17 2023     Updated:  04/13/17 2032    Narrative:       EXAMINATION:  CT HEAD WO CONTRAST-  4/13/2017 7:59 PM CDT     HISTORY: The patient fell. There is nausea. The patient is on blood  thinners.     TECHNIQUE: Multiple axial images were obtained through the brain without  contrast  infusion. Multiplanar images were reconstructed.     DLP: 736 mGy-cm. Automated dosage control was utilized.     COMPARISON: No comparison study.     FINDINGS: There are no hemorrhage, edema or significant mass effect.  There is moderate atrophy with associated ventricular prominence. There  is extensive low density in the white matter that is nonspecific and  most likely due to chronic small vessel disease. There is a  calcification along the inner table of the calvarium in the left frontal  region. Vascular calcification is noted. The visualized paranasal  sinuses and mastoid air cells are clear. There is no calvarial fracture.       Impression:       1. No hemorrhage, edema or mass effect.  2. Moderate atrophy with associated ventricular prominence.  3. Extensive low density in the white matter is nonspecific and most  likely due to chronic small vessel disease.     The full report of this exam was immediately signed and available to the  emergency room. The patient is currently in the emergency room.        This report was finalized on 04/13/2017 20:29 by Dr. Irving Charles MD.          Imaging was brought up and reviewed and I agree with the radiology findings.    IMPRESSION/RECOMMENDATIONS:    Principal Problem:    Closed intertrochanteric fracture of right hip      Assessment: Right intertrochanteric hip fracture    Plan:  1) The patient has been admitted to the hospital.  She will undergo a right trochanteric fixation nailing today.  The risks and benefits of this procedure were discussed with the patient and her family and they wish to proceed.        Electronically signed by JASWINDER Lakhani8:59 AM4/14/2017

## 2017-04-14 NOTE — ANESTHESIA PROCEDURE NOTES
Airway  Urgency: elective    Airway not difficult    General Information and Staff    Patient location during procedure: OR  CRNA: ROLO MOHAMUD    Indications and Patient Condition  Indications for airway management: airway protection    Preoxygenated: yes  Mask difficulty assessment: 1 - vent by mask    Final Airway Details  Final airway type: endotracheal airway      Successful airway: ETT  Cuffed: yes   Successful intubation technique: direct laryngoscopy  Endotracheal tube insertion site: oral  Blade: Anitha  Blade size: #3  ETT size: 7.0 mm  Cormack-Lehane Classification: grade I - full view of glottis  Placement verified by: chest auscultation and capnometry   Cuff volume (mL): 8  Measured from: teeth  ETT to teeth (cm): 19  Number of attempts at approach: 1

## 2017-04-14 NOTE — H&P
ENCOUNTER TIME: 4:10 a.m.    CHIEF COMPLAINT: Brought in for evaluation of right hip pain, status post fall.    HISTORY OF PRESENT ILLNESS: Patient is an 84-year-old  female with a past medical history significant for generalized anxiety disorder, osteoarthritis, hypertension, ulcerative colitis who came in via the emergency room brought in for evaluation of right hip pain, status post fall. According to the patient, usually she is able to ambulate with a cane. On the day of presentation, patient had an accidental fall. She denies any prodromal symptoms. There was no dizziness, no lightheadedness. It was just a mere accidental fall. Patient complains of pain localized to the right hip. According to the patient, at the height of pain intensity it is about 9 out of 10. It is constant, sharp, nonradiating. Denies any alleviating or aggravating factors.    REVIEW OF SYSTEMS:   CONSTITUTIONAL: No fever, no chills, no significant weight loss.  EYES: Negative.  EARS, NOSE, THROAT: Negative.  RESPIRATORY: Negative.  CARDIOVASCULAR: Negative.  GASTROINTESTINAL: Patient had nausea and vomiting.  GENITOURINARY: Negative.  INTEGUMENT: Chronic skin changes.  MUSCULOSKELETAL: As indicated in History of Present Illness.  HEMATOLOGIC: Negative.  ENDOCRINE: Negative.  NEUROLOGIC: Negative.  PSYCHIATRIC: Negative.  ALLERGY/IMMUNOLOGIC: Negative.    PAST MEDICAL HISTORY:   1.  Hypertension.  2.  Syndrome of inappropriate ADH secretion.   3.  Ulcerative colitis.  4.  Osteoarthritis.  5.  Iron deficiency anemia.  6.  Pulmonary nodule.    PAST SURGICAL HISTORY:   1.  Cholecystectomy.  2.  Eye surgery.  3.  Hip trochanteric nailing with intramedullary hip screws.  4.  Joint replacement.  5.  Total knee replacement, bilateral.    ALLERGIES:   1.  ALBUTEROL.  2.  CEFPROZIL.  3.  DOXYCYCLINE.  4.  ERYTHROMYCIN.  5.  MOXIFLOXACIN.  6.  SULFA ANTIBIOTICS.    HOME MEDICATIONS:  1.  Acetaminophen.  2.  Aspirin.  3.  Colace.  4.   Lasix.  5.  Norco.  6.  Cozaar.  7.  Methachlor.  8.  Metoprolol.  9.  Nystatin.  10.  Zofran.  11.  Protonix.  12.  Prednisone.  13.  Xarelto.    SOCIAL HISTORY: Patient is a nonsmoker, no alcohol use, no illicit drug use.    FAMILY HISTORY: Reviewed with patient, not pertinent.    PHYSICAL EXAMINATION:   VITAL SIGNS: Temperature 97.4, respiratory rate is 16, heart rate is 84, blood pressure 129/66, saturation 98%.  GENERAL APPEARANCE: This is an elderly female in no apparent respiratory distress.  HEAD AND NECK: Normocephalic, atraumatic. Pupils reactive to light and accommodation. Extraocular muscles intact. Mucous membranes pink and moist. Neck is supple.   CHEST: Air entry adequate bilaterally.   HEART: The 1st and 2nd heart sounds heard.   ABDOMEN: Very protuberant but soft, nontender, nondistended.   NEUROLOGIC: Patient is awake, communicating.  EXTREMITIES: Lower extremities show trace edema.    DIAGNOSTIC DATA: Urinalysis: Nitrite positive, leukocyte esterase moderate, WBC 31-50, ketones trace. Glucose is 100, BUN 15, creatinine 0.8, sodium 131, potassium is 3.6, chloride 94, bicarbonate 24, calcium is 9.4, total protein 6.7, albumin is 3.7, alkaline phosphatase 93, total bilirubin 0.6. PT is 12.8, INR 0.9, PTT 26.6. WBC 10.4, hemoglobin 12.2, hematocrit 35.4, platelets 266,000. Radiographic studies done. Patient had a CT of the head without contrast. No hemorrhage, edema, or mass effect. There is moderate atrophy with associated ventricular prominence. Extensive low density in the white matter is nonspecific, most likely due to chronic small vessel disease. X-ray of the hip: There is intertrochanteric fracture of the right femoral neck with varus angulation. A component of the fracture extends into the proximal shaft of the femur medially. The lesser trochanter appears to be a separate fragment. There has been prior left hip fracture repair with hardware. The bones are demineralized. There are degenerative  changes of the lumbar spine. X-ray of the chest: There is poor inspiration, coarse markings, and bronchial wall thickening remains stable. There is cardiomegaly. No CHF.    PROVISIONAL DIAGNOSES:   1.  Proximal right femur intertrochanteric fracture.  2.  Urinary tract infection.  3.  Essential hypertension.  4.  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  5.  Pulmonary nodule.  6.  Iron deficiency anemia.    PLAN: Admit to regular floor. Patient is hemodynamically stable. Patient came in with a fall. X-ray revealed right intertrochanteric fracture. Patient to be seen in consultation by the orthopedic surgeon. Keep patient on pain control using Dilaudid 1 mg IV every 4 hours p.r.n. Will make adjustments in pain control therapy if needed. At this point will just keep patient n.p.o. for now in anticipation for surgery.

## 2017-04-14 NOTE — ANESTHESIA PREPROCEDURE EVALUATION
Anesthesia Evaluation     Patient summary reviewed and Nursing notes reviewed   no history of anesthetic complications:  NPO Status: > 8 hours   Airway   Mallampati: III  TM distance: <3 FB  possible difficult intubation  Dental      Pulmonary     breath sounds clear to auscultation  Cardiovascular   Exercise tolerance: poor (<4 METS)    ECG reviewed  Patient on routine beta blocker and Beta blocker given within 24 hours of surgery  Rhythm: regular  Rate: normal    (+) hypertension,       Neuro/Psych  (+) TIA,    GI/Hepatic/Renal/Endo      ROS Comment: Ulcerative colitis    Musculoskeletal     Abdominal    Substance History      OB/GYN          Other   (+) arthritis       Other Comment: H/o SIADH      Phys Exam Other: Previously grade IIa view with MIL 2.                           Anesthesia Plan    ASA 3     general   (Pt bedbound, in nursing home since last hip fracture. Avoid succinylcholine)  intravenous induction   Anesthetic plan and risks discussed with patient.

## 2017-04-14 NOTE — BRIEF OP NOTE
HIP TROCANTERIC NAILING SHORT WITH INTRAMEDULLARY HIP SCREW  Procedure Note    Tarcee Neal  4/13/2017 - 4/14/2017    Pre-op Diagnosis:   * No pre-op diagnosis entered *    Post-op Diagnosis:     * No Diagnosis Codes entered *    Procedure/CPT® Codes:      Procedure(s):  HIP TROCANTERIC NAILING SHORT WITH INTRAMEDULLARY HIP SCREW    Surgeon(s):  Gael Locke MD    Anesthesia: General    Staff:   Circulator: Canelo Barakat RN; Lore Colon RN  Scrub Person: Loc Chiu; Yusef Quezada  Assistant: Lore Pickens    Estimated Blood Loss: * No values recorded between 4/14/2017  1:19 PM and 4/14/2017  2:13 PM *  Urine Voided: * No values recorded between 4/14/2017  1:19 PM and 4/14/2017  2:13 PM *    Specimens:                * No specimens in log *      Drains:   Urethral Catheter 04/13/17 2340 latex 18 10 (Active)   Daily Indications Required activity restriction from trauma, surgery, (e.g. unstable spine, fracture, hemodynamics) 4/14/2017  7:59 AM   Securement secured to upper leg with adhesive device 4/14/2017  7:59 AM   Catheter care done Yes 4/14/2017 11:35 AM   Urine Output (mL) 400 4/14/2017 11:35 AM           Findings: intertrochanteric fx rt hip    Complications: none      Gael Locke MD     Date: 4/14/2017  Time: 2:13 PM

## 2017-04-15 LAB
ANION GAP SERPL CALCULATED.3IONS-SCNC: 10 MMOL/L (ref 4–13)
BACTERIA SPEC AEROBE CULT: ABNORMAL
BUN BLD-MCNC: 13 MG/DL (ref 5–21)
BUN/CREAT SERPL: 19.4 (ref 7–25)
CALCIUM SPEC-SCNC: 8.6 MG/DL (ref 8.4–10.4)
CHLORIDE SERPL-SCNC: 97 MMOL/L (ref 98–110)
CO2 SERPL-SCNC: 24 MMOL/L (ref 24–31)
CREAT BLD-MCNC: 0.67 MG/DL (ref 0.5–1.4)
GFR SERPL CREATININE-BSD FRML MDRD: 84 ML/MIN/1.73
GLUCOSE BLD-MCNC: 105 MG/DL (ref 70–100)
HCT VFR BLD AUTO: 30.9 % (ref 37–47)
HGB BLD-MCNC: 10.5 G/DL (ref 12–16)
INR PPP: 1.15 (ref 0.91–1.09)
MAGNESIUM SERPL-MCNC: 1.5 MG/DL (ref 1.4–2.2)
POTASSIUM BLD-SCNC: 4.3 MMOL/L (ref 3.5–5.3)
PROTHROMBIN TIME: 15.1 SECONDS (ref 11.9–14.6)
SODIUM BLD-SCNC: 131 MMOL/L (ref 135–145)

## 2017-04-15 PROCEDURE — 85610 PROTHROMBIN TIME: CPT | Performed by: ORTHOPAEDIC SURGERY

## 2017-04-15 PROCEDURE — G8979 MOBILITY GOAL STATUS: HCPCS | Performed by: PHYSICAL THERAPIST

## 2017-04-15 PROCEDURE — 97166 OT EVAL MOD COMPLEX 45 MIN: CPT

## 2017-04-15 PROCEDURE — 97110 THERAPEUTIC EXERCISES: CPT

## 2017-04-15 PROCEDURE — 85014 HEMATOCRIT: CPT | Performed by: ORTHOPAEDIC SURGERY

## 2017-04-15 PROCEDURE — G8988 SELF CARE GOAL STATUS: HCPCS

## 2017-04-15 PROCEDURE — G8987 SELF CARE CURRENT STATUS: HCPCS

## 2017-04-15 PROCEDURE — 80048 BASIC METABOLIC PNL TOTAL CA: CPT | Performed by: INTERNAL MEDICINE

## 2017-04-15 PROCEDURE — 97530 THERAPEUTIC ACTIVITIES: CPT | Performed by: PHYSICAL THERAPIST

## 2017-04-15 PROCEDURE — 25010000002 HYDROMORPHONE PER 4 MG: Performed by: INTERNAL MEDICINE

## 2017-04-15 PROCEDURE — 97162 PT EVAL MOD COMPLEX 30 MIN: CPT | Performed by: PHYSICAL THERAPIST

## 2017-04-15 PROCEDURE — 83735 ASSAY OF MAGNESIUM: CPT | Performed by: INTERNAL MEDICINE

## 2017-04-15 PROCEDURE — G8978 MOBILITY CURRENT STATUS: HCPCS | Performed by: PHYSICAL THERAPIST

## 2017-04-15 PROCEDURE — 85018 HEMOGLOBIN: CPT | Performed by: ORTHOPAEDIC SURGERY

## 2017-04-15 RX ORDER — POLYETHYLENE GLYCOL 3350 17 G/17G
17 POWDER, FOR SOLUTION ORAL ONCE
Status: COMPLETED | OUTPATIENT
Start: 2017-04-15 | End: 2017-04-15

## 2017-04-15 RX ORDER — MESALAMINE 800 MG/1
800 TABLET, DELAYED RELEASE ORAL EVERY 12 HOURS
Status: DISCONTINUED | OUTPATIENT
Start: 2017-04-15 | End: 2017-04-18 | Stop reason: HOSPADM

## 2017-04-15 RX ORDER — WARFARIN SODIUM 2 MG/1
2 TABLET ORAL
Status: DISCONTINUED | OUTPATIENT
Start: 2017-04-15 | End: 2017-04-18 | Stop reason: HOSPADM

## 2017-04-15 RX ADMIN — MESALAMINE 800 MG: 800 TABLET, DELAYED RELEASE ORAL at 22:16

## 2017-04-15 RX ADMIN — DOCUSATE SODIUM 100 MG: 100 CAPSULE ORAL at 08:51

## 2017-04-15 RX ADMIN — WARFARIN 2 MG: 2 TABLET ORAL at 19:05

## 2017-04-15 RX ADMIN — METOPROLOL SUCCINATE 25 MG: 25 TABLET, FILM COATED, EXTENDED RELEASE ORAL at 08:51

## 2017-04-15 RX ADMIN — HYDROCODONE BITARTRATE AND ACETAMINOPHEN 1 TABLET: 7.5; 325 TABLET ORAL at 03:38

## 2017-04-15 RX ADMIN — POLYETHYLENE GLYCOL (3350) 17 G: 17 POWDER, FOR SOLUTION ORAL at 23:05

## 2017-04-15 RX ADMIN — HYDROMORPHONE HYDROCHLORIDE 1 MG: 1 INJECTION, SOLUTION INTRAMUSCULAR; INTRAVENOUS; SUBCUTANEOUS at 08:14

## 2017-04-15 RX ADMIN — BISACODYL 10 MG: 5 TABLET, COATED ORAL at 06:35

## 2017-04-15 RX ADMIN — ASPIRIN 81 MG: 81 TABLET ORAL at 08:50

## 2017-04-15 RX ADMIN — AZTREONAM 1 G: 1 INJECTION, POWDER, FOR SOLUTION INTRAMUSCULAR; INTRAVENOUS at 22:16

## 2017-04-15 RX ADMIN — SODIUM CHLORIDE, POTASSIUM CHLORIDE, SODIUM LACTATE AND CALCIUM CHLORIDE 50 ML/HR: 600; 310; 30; 20 INJECTION, SOLUTION INTRAVENOUS at 01:25

## 2017-04-15 RX ADMIN — AZTREONAM 1 G: 1 INJECTION, POWDER, FOR SOLUTION INTRAMUSCULAR; INTRAVENOUS at 13:32

## 2017-04-15 RX ADMIN — AZTREONAM 1 G: 1 INJECTION, POWDER, FOR SOLUTION INTRAMUSCULAR; INTRAVENOUS at 07:40

## 2017-04-15 RX ADMIN — BISACODYL 10 MG: 5 TABLET, COATED ORAL at 19:05

## 2017-04-15 RX ADMIN — DOCUSATE SODIUM 100 MG: 100 CAPSULE ORAL at 17:10

## 2017-04-15 NOTE — PLAN OF CARE
Problem: Patient Care Overview (Adult)  Goal: Plan of Care Review  Outcome: Ongoing (interventions implemented as appropriate)    04/15/17 2255   Coping/Psychosocial Response Interventions   Plan Of Care Reviewed With patient   Patient Care Overview   Progress no change   Outcome Evaluation   Outcome Summary/Follow up Plan pt has s/s of pain at times with activity. pt has moved in bed with pt/ot. her appetite is decreased and family is at bedside encouraging pt to eat. drsg cdi       Goal: Adult Individualization and Mutuality  Outcome: Ongoing (interventions implemented as appropriate)  Goal: Discharge Needs Assessment  Outcome: Ongoing (interventions implemented as appropriate)    Problem: Fractured Hip (Adult)  Goal: Signs and Symptoms of Listed Potential Problems Will be Absent or Manageable (Fractured Hip)  Outcome: Ongoing (interventions implemented as appropriate)    Problem: Perioperative Period (Adult)  Goal: Signs and Symptoms of Listed Potential Problems Will be Absent or Manageable (Perioperative Period)  Outcome: Ongoing (interventions implemented as appropriate)    Problem: Fall Risk (Adult)  Goal: Identify Related Risk Factors and Signs and Symptoms  Outcome: Outcome(s) achieved Date Met:  04/15/17  Goal: Absence of Falls  Outcome: Ongoing (interventions implemented as appropriate)    Problem: Pressure Ulcer (Adult)  Goal: Signs and Symptoms of Listed Potential Problems Will be Absent or Manageable (Pressure Ulcer)  Outcome: Ongoing (interventions implemented as appropriate)

## 2017-04-15 NOTE — PLAN OF CARE
Problem: Inpatient Physical Therapy  Goal: Bed Mobility Goal LTG- PT  Outcome: Ongoing (interventions implemented as appropriate)    04/15/17 0842   Bed Mobility PT LTG   Bed Mobility PT LTG, Date Established 04/15/17   Bed Mobility PT LTG, Time to Achieve by discharge   Bed Mobility PT LTG, Activity Type all bed mobility   Bed Mobility PT LTG, [place] Level supervision required   Bed Mobility PT Goal LTG, Assist Device bed rails       Goal: Transfer Training Goal 1 LTG- PT  Outcome: Ongoing (interventions implemented as appropriate)    04/15/17 0842   Transfer Training PT LTG   Transfer Training PT LTG, Date Established 04/15/17   Transfer Training PT LTG, Time to Achieve by discharge   Transfer Training PT LTG, Activity Type all transfers   Transfer Training PT LTG, [place] Level minimum assist (75% patient effort)   Transfer Training PT LTG, Assist Device other (see comments)  (appropriate)       Goal: Gait Training Goal LTG- PT  Outcome: Ongoing (interventions implemented as appropriate)    04/15/17 0842   Gait Training PT LTG   Gait Training Goal PT LTG, Date Established 04/15/17   Gait Training Goal PT LTG, Time to Achieve by discharge   Gait Training Goal PT LTG, [place] Level minimum assist (75% patient effort)   Gait Training Goal PT LTG, Assist Device walker, rolling   Gait Training Goal PT LTG, Distance to Achieve 25'       Goal: Patient Education Goal LTG- PT  Outcome: Ongoing (interventions implemented as appropriate)    04/15/17 0842   Patient Education PT LTG   Patient Education PT LTG, Date Established 04/15/17   Patient Education PT LTG, Time to Achieve by discharge   Patient Education PT LTG, Education Type HEP;positioning;progression of POC;benefits of activity;home safety;gait;transfers;bed mobility   Patient Education PT LTG, Education Understanding demonstrate adequately;verbalize understanding         04/15/17 0842   Patient Education PT LTG   Patient Education PT LTG, Date  Established 04/15/17   Patient Education PT LTG, Time to Achieve by discharge   Patient Education PT LTG, Education Type HEP;positioning;progression of POC;benefits of activity;home safety;gait;transfers;bed mobility   Patient Education PT LTG, Education Understanding demonstrate adequately;verbalize understanding

## 2017-04-15 NOTE — PLAN OF CARE
Problem: Patient Care Overview (Adult)  Goal: Plan of Care Review  Outcome: Ongoing (interventions implemented as appropriate)    04/15/17 0885   Coping/Psychosocial Response Interventions   Plan Of Care Reviewed With patient   Outcome Evaluation   Outcome Summary/Follow up Plan Initial physical therapy evaluation completed. Pt required max X 2 for bed mobility. She was very anxious and needed encouragement for acitvity. Skilled therapy needed to improve independence with bed mobiltiy, transfers and progress with gait activities, as tolerated. She will most likely need to return to skilled nursing care.

## 2017-04-15 NOTE — PROGRESS NOTES
Continued Stay Note   Sophie     Patient Name: Tracee Neal  MRN: [id number]  Today's Date: 4/15/2017    Admit Date: 4/13/2017          Discharge Plan       04/15/17 1345    Case Management/Social Work Plan    Plan Pt is from [facility], skilled level with a bed hold.  ANGÉLICA Morrow.     Patient/Family In Agreement With Plan yes              Discharge Codes     None            ANGÉLICA Waller

## 2017-04-15 NOTE — PLAN OF CARE
Problem: Patient Care Overview (Adult)  Goal: Plan of Care Review  Outcome: Ongoing (interventions implemented as appropriate)    04/15/17 0453   Coping/Psychosocial Response Interventions   Plan Of Care Reviewed With patient   Patient Care Overview   Progress no change   Outcome Evaluation   Outcome Summary/Follow up Plan tachycardic most of shift; drowsy post surgery but arouses to voice; c/o right hip pain; prn pain med offers relief; robison D/C in am       Goal: Adult Individualization and Mutuality  Outcome: Ongoing (interventions implemented as appropriate)  Goal: Discharge Needs Assessment  Outcome: Ongoing (interventions implemented as appropriate)    Problem: Fractured Hip (Adult)  Goal: Signs and Symptoms of Listed Potential Problems Will be Absent or Manageable (Fractured Hip)  Outcome: Ongoing (interventions implemented as appropriate)    Problem: Perioperative Period (Adult)  Goal: Signs and Symptoms of Listed Potential Problems Will be Absent or Manageable (Perioperative Period)  Outcome: Ongoing (interventions implemented as appropriate)    Problem: Fall Risk (Adult)  Goal: Identify Related Risk Factors and Signs and Symptoms  Outcome: Ongoing (interventions implemented as appropriate)  Goal: Absence of Falls  Outcome: Ongoing (interventions implemented as appropriate)    Problem: Pressure Ulcer (Adult)  Goal: Signs and Symptoms of Listed Potential Problems Will be Absent or Manageable (Pressure Ulcer)  Outcome: Ongoing (interventions implemented as appropriate)

## 2017-04-15 NOTE — PROGRESS NOTES
Acute Care - Physical Therapy Initial Evaluation  [facility]     Patient Name: Tracee Neal  : 4/15/1932  MRN: [id number]  Today's Date: 4/15/2017   Onset of Illness/Injury or Date of Surgery Date: 17  Date of Referral to PT: 17  Referring Physician: Dr. Locek      Admit Date: 2017     Visit Dx:    ICD-10-CM ICD-9-CM   1. Hip fracture, right, closed, initial encounter S72.001A 820.8   2. Impaired physical mobility Z74.09 781.99   3. Impaired mobility and ADLs Z74.09 799.89     Patient Active Problem List   Diagnosis   • Generalized weakness   • SIADH (syndrome of inappropriate ADH production)   • HTN (hypertension)   • Osteoarthritis   • Ulcerative colitis   • Pulmonary nodules   • Iron deficiency anemia   • Bacterial UTI   • Altered mental status   • Hip fracture requiring operative repair   • Closed intertrochanteric fracture of right hip     Past Medical History:   Diagnosis Date   • Anxiety    • Arthritis    • Clostridium difficile infection    • Concussion    • Fall    • History of transfusion    • Hypertension    • Inappropriate vasopressin secretion syndrome    • Irregular heart rhythm    • TIA (transient ischemic attack)    • Ulcerative colitis      Past Surgical History:   Procedure Laterality Date   • CHOLECYSTECTOMY     • EYE SURGERY     • HIP TROCANTERIC NAILING WITH INTRAMEDULLARY HIP SCREW Left 2/15/2017    Procedure: HIP TROCANTERIC NAILING SHORT WITH INTRAMEDULLARY HIP SCREW;  Surgeon: Pastor Lee MD;  Location: [facility];  Service:    • HIP TROCANTERIC NAILING WITH INTRAMEDULLARY HIP SCREW Right 2017    Procedure: HIP TROCANTERIC NAILING SHORT WITH INTRAMEDULLARY HIP SCREW;  Surgeon: Gael Locke MD;  Location: [facility] OR;  Service:    • JOINT REPLACEMENT      BILATERAL KNEES AND LEFT HIP   • REPLACEMENT TOTAL KNEE BILATERAL            PT ASSESSMENT (last 72 hours)      PT Evaluation       04/15/17 0751 04/15/17 0748    Rehab Evaluation    Document Type evaluation   -KR evaluation   see MAR  -AC    Subjective Information agree to therapy;complains of;pain;fatigue  -KR agree to therapy;complains of;pain;fatigue  -AC    Patient Effort, Rehab Treatment  poor  -AC    Symptoms Noted During/After Treatment  increased pain;other (see comments)  -AC    Symptoms Noted Comment  RN gave pain meds  -    General Information    Patient Profile Review yes  -KR yes  -AC    Onset of Illness/Injury or Date of Surgery Date 04/13/17  -KR 04/13/17  -AC    Referring Physician Dr. Locke  -KR Dr. Locke  -    General Observations 3L NC, continuous pulse ox, IV; lying supine easisly aroused, but does not stay this way   Simultaneous filing. User may be unaware of other data.  -KR lying supine in bed, O2 sat monitor, 3L O2 per nc, lethargic  -    Pertinent History Of Current Problem Fall with R hip pain; R hip fracature s/p IM nailing  -KR     Precautions/Limitations fall precautions  -KR fall precautions  -AC    Prior Level of Function --   reports using walking for ambulation; unable to determine   -KR     Plans/Goals Discussed With patient;agreed upon  -KR patient;agreed upon  -AC    Risks Reviewed patient:;LOB;increased discomfort  -KR patient:;LOB;nausea/vomiting;dizziness;increased discomfort;change in vital signs;increased drainage;lines disloged  -AC    Benefits Reviewed patient:;improve function;increase independence;increase strength;increase balance;decrease pain  -KR patient:;improve function;increase independence;increase strength;increase balance;decrease pain;increase knowledge  -    Barriers to Rehab cognitive status;previous functional deficit  -KR previous functional deficit;cognitive status  -    Living Environment    Lives With facility resident  -     Living Arrangements --   current resident at [place]  -     Clinical Impression    Date of Referral to PT 04/14/17  -KR     Patient/Family Goals Statement return superior  -KR     Criteria for Skilled Therapeutic  Interventions Met yes  -KR     Rehab Potential good, to achieve stated therapy goals  -KR     Predicted Duration of Therapy Intervention (days/wks) until discharge  -KR     Vital Signs    Pretreatment Heart Rate (beats/min) 91  -KR 86  -AC    Pre SpO2 (%) 97  -KR 97  -AC    O2 Delivery Pre Treatment supplemental O2   3L NC  -KR supplemental O2   3L O2 per nc  -AC    Pre Patient Position Supine  -KR Supine  -AC    Pain Assessment    Pain Assessment 0-10  -KR [place] FACES  -AC    [place] FACES Pain Rating  2  -AC    Pain Score 8  -KR     Pain Type Acute pain  -KR Acute pain  -AC    Pain Location Hip  -KR Throat  -AC    Pain Orientation Right  -KR     Pain Descriptors  Sore  -AC    Pain Frequency Intermittent  -KR     Pain Intervention(s) Repositioned;Medication (See MAR);MD notified (Comment)  -KR     Response to Interventions tolerated  -KR     Multiple Pain Sites Yes  -KR Yes  -AC    Pain 2    Pain Score 2 --   2 on paris baker  -KR 8  -AC    Pre Tx Pain Score 2  8  -AC    Pain Type 2 Acute pain  -KR Acute pain  -AC    Pain Location 2 Throat  -KR Hip  -AC    Pain Orientation 2  Right  -AC    Pain Intervention(s) 2 Other (Comment)   gave water  -KR Medication (See MAR);Repositioned;Ambulation/increased activity   RN gave meds post eval  -AC    Cognitive Assessment/Intervention    Current Cognitive/Communication Assessment impaired  -KR functional  -AC    Orientation Status oriented to;person;place;situation  -KR oriented x 4;other (see comments)   not oriented to time,   -AC    Follows Commands/Answers Questions 100% of the time;able to follow multi-step instructions;needs increased time;needs cueing  -% of the time;able to follow multi-step instructions  -AC    Personal Safety  other (see comments)   slightly disoriented but no apparent safety issues  -AC    Personal Safety Interventions elopement precautions initiated;fall prevention program maintained;nonskid shoes/slippers when out of bed;supervised  activity  -KR elopement precautions initiated;fall prevention program maintained;gait belt;nonskid shoes/slippers when out of bed;supervised activity  -AC    ROM (Range of Motion)    General ROM Detail limited 50-75% on RLE by pain; WFL LLE; see OT for UEss  -KR R shoulder impaired 75% due to rotator cuff injury, LUE WFL AROM  -AC    MMT (Manual Muscle Testing)    General MMT Assessment Detail unable to fully assess  -KR functionaly 4/5 B  -AC    Mobility Assessment/Training    Extremity Weight-Bearing Status right upper extremity  -KR right lower extremity  -AC    Right Lower Extremity Weight-Bearing weight-bearing as tolerated  -KR weight-bearing as tolerated  -AC    Bed Mobility, Assessment/Treatment    Bed Mobility, Assistive Device draw sheet;bed rails;head of bed elevated  -KR bed rails;head of bed elevated;draw sheet  -AC    Bed Mobility, Roll Left, [place] maximum assist (25% patient effort);2 person assist required  -KR maximum assist (25% patient effort);2 person assist required  -AC    Bed Mobility, Roll Right, [place] maximum assist (25% patient effort);2 person assist required  -KR maximum assist (25% patient effort);2 person assist required  -AC    Bed Mobility, Scoot/Bridge, [place] maximum assist (25% patient effort);2 person assist required  -KR maximum assist (25% patient effort);2 person assist required  -AC    Bed Mob, Supine to Sit, [place] maximum assist (25% patient effort);2 person assist required  -KR maximum assist (25% patient effort);2 person assist required  -AC    Bed Mob, Sit to Supine, [place] maximum assist (25% patient effort);2 person assist required  -KR maximum assist (25% patient effort);2 person assist required  -AC    Bed Mobility, Safety Issues decreased use of arms for pushing/pulling;decreased use of legs for bridging/pushing  -KR decreased use of arms for pushing/pulling;decreased use of legs for bridging/pushing  -AC    Bed Mobility, Impairments  "ROM decreased;pain;strength decreased  -KR pain;strength decreased;ROM decreased  -AC    Bed Mobility, Comment kept saying \"I can't I can't\"   -KR she was anxious about bed moblity and in pain stating, \"I can't, I can't\" over and over; needed lots of encouragement  -AC    Transfer Assessment/Treatment    Transfer, Comment unable to attempt  -KR not attempted due to weakness  -AC    Gait Assessment/Treatment    Gait, Comment unable to attempt  -KR     Motor Skills/Interventions    Additional Documentation Balance Skills Training (Group)  -KR Balance Skills Training (Group)  -AC    Balance Skills Training    Sitting-Level of Assistance Contact guard;Close supervision;Minimum assistance   became very drowsey after pain med and leans L   -KR Close supervision;Contact guard   leaned L soon after coming to sit, became very lethargic  -AC    Sitting-Balance Support Right upper extremity supported;Left upper extremity supported;Feet supported  -KR Feet supported;Right upper extremity supported;Left upper extremity supported   after pain meds  -AC    Sensory Assessment/Intervention    Sensory Impairment --   unable to assess  -KR --   unable to assess  -AC    Positioning and Restraints    Pre-Treatment Position in bed  -KR in bed  -AC    Post Treatment Position bed  -KR bed  -AC    In Bed fowlers;call light within reach;encouraged to call for assist;with nsg  -KR fowlers;call light within reach;encouraged to call for assist;with nsg;side rails up x2;RLE elevated;LLE elevated  -AC      04/14/17 1017 04/13/17 5202    General Information    Equipment Currently Used at Home other (see comments)   All needed DME provided at [facility]     Living Environment    Lives With facility resident  - facility resident   [facility]    Living Arrangements other (see comments)   Skilled nursing facility - [facility] residential [facility]    Home Accessibility  no concerns  -[facility]    Stair Railings at Home  none  -[facility]    " Type of Financial/Environmental Concern  none  -[facility]    Transportation Available ambulance  - ambulance  -[facility]      04/13/17 2230       General Information    Equipment Currently Used at Home walker, rolling  -PABLO       User Key  (r) = Recorded By, (t) = Taken By, (c) = Cosigned By    Initials Name Provider Type    AC Naresh Lombardi, OTR/L Occupational Therapist    JOSE Gonzalez, PT DPT Physical Therapist    PABLO Alvarado, RN Registered Nurse    REZA Stewart, BSW           Physical Therapy Education     Title: PT OT SLP Therapies (Active)     Topic: Physical Therapy (Done)     Point: Mobility training (Done)    Learning Progress Summary    Learner Readiness Method Response Comment Documented by Status   Patient Acceptance E VU importance of activity  04/15/17 0840 Done               Point: Home exercise program (Done)    Learning Progress Summary    Learner Readiness Method Response Comment Documented by Status   Patient Acceptance E VU importance of activity KR 04/15/17 0840 Done               Point: Body mechanics (Done)    Learning Progress Summary    Learner Readiness Method Response Comment Documented by Status   Patient Acceptance E VU importance of activity KR 04/15/17 0840 Done               Point: Precautions (Done)    Learning Progress Summary    Learner Readiness Method Response Comment Documented by Status   Patient Acceptance E VU importance of activity KR 04/15/17 0840 Done                      User Key     Initials Effective Dates Name Provider Type Discipline    JOSE 06/19/15 -  Ely Gonzalez, PT DPT Physical Therapist PT                PT Recommendation and Plan  Planned Therapy Interventions: bed mobility training, balance training, home exercise program, gait training, patient/family education, postural re-education, ROM (Range of Motion), strengthening, stretching, transfer training  PT Frequency: daily, 2 times/day, per priority policy  Plan of Care  Review  Plan Of Care Reviewed With: patient  Outcome Summary/Follow up Plan: Initial physical therapy evaluation completed. Pt required max X 2 for bed mobility. She was very anxious and needed encouragement for acitvity. Skilled therapy needed to improve independence with bed mobiltiy, transfers and progress with gait activities, as tolerated. She will  most likely need to return to skilled nursing care.           IP PT Goals       04/15/17 0842          Bed Mobility PT LTG    Bed Mobility PT LTG, Date Established 04/15/17  -KR      Bed Mobility PT LTG, Time to Achieve by discharge  -KR      Bed Mobility PT LTG, Activity Type all bed mobility  -KR      Bed Mobility PT LTG, [place] Level supervision required  -KR      Bed Mobility PT Goal  LTG, Assist Device bed rails  -KR      Transfer Training PT LTG    Transfer Training PT LTG, Date Established 04/15/17  -KR      Transfer Training PT LTG, Time to Achieve by discharge  -KR      Transfer Training PT LTG, Activity Type all transfers  -KR      Transfer Training PT LTG, [place] Level minimum assist (75% patient effort)  -KR      Transfer Training PT LTG, Assist Device other (see comments)   appropriate  -KR      Gait Training PT LTG    Gait Training Goal PT LTG, Date Established 04/15/17  -KR      Gait Training Goal PT LTG, Time to Achieve by discharge  -KR      Gait Training Goal PT LTG, [place] Level minimum assist (75% patient effort)  -KR      Gait Training Goal PT LTG, Assist Device walker, rolling  -KR      Gait Training Goal PT LTG, Distance to Achieve 25'  -KR      Patient Education PT LTG    Patient Education PT LTG, Date Established 04/15/17  -KR      Patient Education PT LTG, Time to Achieve by discharge  -KR      Patient Education PT LTG, Education Type HEP;positioning;progression of POC;benefits of activity;home safety;gait;transfers;bed mobility  -KR      Patient Education PT LTG, Education Understanding demonstrate adequately;verbalize  understanding  -KR        User Key  (r) = Recorded By, (t) = Taken By, (c) = Cosigned By    Initials Name Provider Type    JOSE Gonzalez, PT DPT Physical Therapist                Outcome Measures       04/15/17 0843 04/15/17 0800       How much help from another person do you currently need...    Turning from your back to your side while in flat bed without using bedrails?  2  -KR     Moving from lying on back to sitting on the side of a flat bed without bedrails?  2  -KR     Moving to and from a bed to a chair (including a wheelchair)?  1  -KR     Standing up from a chair using your arms (e.g., wheelchair, bedside chair)?  1  -KR     Climbing 3-5 steps with a railing?  1  -KR     To walk in hospital room?  1  -KR     AM-PAC 6 Clicks Score  8  -KR     How much help from another is currently needed...    Putting on and taking off regular lower body clothing? 1  -AC      Bathing (including washing, rinsing, and drying) 1  -AC      Toileting (which includes using toilet bed pan or urinal) 1  -AC      Putting on and taking off regular upper body clothing 1  -AC      Taking care of personal grooming (such as brushing teeth) 2  -AC      Eating meals 2  -AC      Score 8  -AC      Functional Assessment    Outcome Measure Options AM-PAC 6 Clicks Daily Activity (OT)  -AC AM-PAC 6 Clicks Basic Mobility (PT)  -KR       User Key  (r) = Recorded By, (t) = Taken By, (c) = Cosigned By    Initials Name Provider Type    AC Naresh Lombardi, OTR/L Occupational Therapist    JOSE Gonzalez, PT DPT Physical Therapist           Time Calculation:         PT Charges       04/15/17 0720          Time Calculation    Start Time 0720  -KR      Stop Time 0826  -KR      Time Calculation (min) 66 min  -KR      PT Received On 04/15/17  -KR      PT Goal Re-Cert Due Date 04/25/17  -KR      Time Calculation- PT    Total Timed Code Minutes- PT 15 minute(s)  -KR        User Key  (r) = Recorded By, (t) = Taken By, (c) = Cosigned By     Initials Name Provider Type    JOSE Gonzalez, PT DPT Physical Therapist          Therapy Charges for Today     Code Description Service Date Service Provider Modifiers Qty    41888261280 HC PT MOBILITY CURRENT 4/15/2017 Ely Gonzalez, PT DPT GP, CM 1    74177570631 HC PT MOBILITY PROJECTED 4/15/2017 Ely Gonzalez, PT DPT GP, CK 1    92301931184 HC PT THERAPEUTIC ACT EA 15 MIN 4/15/2017 Ely Gonzalez, PT DPT GP, KX 1    57502437594 HC PT EVAL MOD COMPLEXITY 3 4/15/2017 Ely Gonzalez, PT DPT GP, KX 1          PT G-Codes  PT Professional Judgement Used?: Yes  Outcome Measure Options: AM-PAC 6 Clicks Daily Activity (OT)  Functional Limitation: Mobility: Walking and moving around  Mobility: Walking and Moving Around Current Status (): At least 80 percent but less than 100 percent impaired, limited or restricted  Mobility: Walking and Moving Around Goal Status (): At least 40 percent but less than 60 percent impaired, limited or restricted      Ely Gonzalez, PT DPT  4/15/2017

## 2017-04-15 NOTE — PLAN OF CARE
Problem: Patient Care Overview (Adult)  Goal: Plan of Care Review  Outcome: Ongoing (interventions implemented as appropriate)    04/15/17 0848   Coping/Psychosocial Response Interventions   Plan Of Care Reviewed With patient   Patient Care Overview   Progress improving   Outcome Evaluation   Outcome Summary/Follow up Plan OT araceli completed. Pt very weak, anxious, lethargic, and disoriented. She is able to follow 1 step commands 100% of the time. MaxA x2 required for bed mobility. Pt unable to stand due to weakness. Dependent for LB dressing. Pt would benefit from skilled OT to address decreased ADLs and mobility, specifically to work on transfers and BADL. Pt will need to return to SNF at discharge.          Problem: Inpatient Occupational Therapy  Goal: Transfer Training Goal 1 LTG- OT  Outcome: Ongoing (interventions implemented as appropriate)    04/15/17 0848   Transfer Training OT LTG   Transfer Training OT LTG, Date Established 04/15/17   Transfer Training OT LTG, Time to Achieve by discharge   Transfer Training OT LTG, Activity Type bed to chair /chair to bed;toilet   Transfer Training OT LTG, [place] Level moderate assist (50% patient effort)   Transfer Training OT LTG, Assist Device commode, bedside;walker, rolling       Goal: Bathing Goal LTG- OT  Outcome: Ongoing (interventions implemented as appropriate)    04/15/17 0848   Bathing OT LTG   Bathing Goal OT LTG, Date Established 04/15/17   Bathing Goal OT LTG, Time to Achieve by discharge   Bathing Goal OT LTG, Activity Type lower body bathing   Bathing Goal OT LTG, [place] Level minimum assist (75% patient effort)   Bathing Goal OT LTG, Assist Device shower chair with back;sponge, long handled;grab bars       Goal: Toileting Goal LTG- OT  Outcome: Ongoing (interventions implemented as appropriate)    04/15/17 0848   Toileting OT LTG   Toileting Goal OT LTG, Date Established 04/15/17   Toileting Goal OT LTG, Time to Achieve by discharge    Toileting Goal OT LTG, [place] Level moderate assist (50% patient effort)       Goal: LB Dressing Goal LTG- OT  Outcome: Ongoing (interventions implemented as appropriate)    04/15/17 0848   LB Dressing OT LTG   LB Dressing Goal OT LTG, Date Established 04/15/17   LB Dressing Goal OT LTG, Time to Achieve by discharge   LB Dressing Goal OT LTG, [place] Level moderate assist (50% patient effort)   LB Dressing Goal OT LTG, Adaptive Equipment dressing stick;laces, elastic;reacher;shoe horn, long handled;sock-aid

## 2017-04-15 NOTE — PROGRESS NOTES
[facility] Medicine Services  INPATIENT PROGRESS NOTE    Length of Stay: 2  Date of Admission: 4/13/2017  Primary Care Physician: Pascual Patten MD    Subjective   Chief Complaint: Trouble voiding  HPI   Patient had Gilman catheter removed earlier this morning, and has had difficulty voiding.  She also reports having pain in her right hip.  Nursing is trying to limit some of her pain medication as she has been somewhat sedated this afternoon.  She denies any chest pain or shortness of breath.  She does report trouble with urinary retention after previous surgeries.    Review of Systems     All pertinent negatives and positives are as above. All other systems have been reviewed and are negative unless otherwise stated.     Objective    Temp:  [97.9 °F (36.6 °C)-98.4 °F (36.9 °C)] 97.9 °F (36.6 °C)  Heart Rate:  [] 81  Resp:  [18] 18  BP: (102-157)/(54-85) 102/54  Physical Exam   Constitutional: She appears well-developed. No distress.   HENT:   Head: Normocephalic.   Mouth/Throat: No oropharyngeal exudate.   Eyes: Pupils are equal, round, and reactive to light. No scleral icterus.   Cardiovascular: Normal rate and regular rhythm.    Pulmonary/Chest: Effort normal.   Abdominal: Soft. She exhibits no distension.   Neurological: She is alert.   Skin: Skin is warm and dry.   Vitals reviewed.    Results Review:  I have reviewed the labs, radiology results, and diagnostic studies.    Laboratory Data:     Results from last 7 days  Lab Units 04/15/17  0536 04/13/17  1934   WBC 10*3/mm3  --  10.40   HEMOGLOBIN g/dL 10.5* 12.2   HEMATOCRIT % 30.9* 35.4*   PLATELETS 10*3/mm3  --  266          Results from last 7 days  Lab Units 04/15/17  0536 04/14/17  0602 04/13/17  1934   SODIUM mmol/L 131* 134* 131*   POTASSIUM mmol/L 4.3 3.8 3.6   CHLORIDE mmol/L 97* 96* 94*   TOTAL CO2 mmol/L 24.0 25.0 24.0   BUN mg/dL 13 13 15   CREATININE mg/dL 0.67 0.68 0.85   CALCIUM mg/dL 8.6 9.0 9.4    BILIRUBIN mg/dL  --   --  0.6   ALK PHOS U/L  --   --  93   ALT (SGPT) U/L  --   --  20   AST (SGOT) U/L  --   --  23   GLUCOSE mg/dL 105* 112* 100       Culture Data:   Urine Culture   Date Value Ref Range Status   04/13/2017 >100,000 CFU/mL Escherichia coli (A)  Final       Radiology Data:   Imaging Results (last 24 hours)     ** No results found for the last 24 hours. **          I have reviewed the patient current medications.     Assessment/Plan     Hospital Problem List     * (Principal)Closed intertrochanteric fracture of right hip        Assessment:  1.  Right intertrochanteric hip fracture POD #1  2.  UTI - Urine culture with E. Coli  3.  Hyponatremia - history of SIADH  4.  Anemia with iron deficiency  5.  Hypertension  6.  Recent history of left hip fracture repair  7.  History of Wegener's granulomatosis  8.  Urinary Retention    Plan:  1.  IV Azactam day 2 (allergies to PCN, cephalosporins, and fluoroquinolones)  2.  CBC and BMP in AM  3.  Coumadin for DVT PPx per Ortho; trend PT/INR  4.  PT/OT  5.  PO Metoprolol  6.  IVFs KVO  7.  Gilman removed earlier this AM and patient is having trouble voiding; bladder scan and I/O cath anticipated  8.  Bowel regimen    Isaac Felipe MD   04/15/17   6:46 PM

## 2017-04-15 NOTE — PROGRESS NOTES
Acute Care - Occupational Therapy Initial Evaluation  [facility]     Patient Name: Tracee Neal  : 4/15/1932  MRN: [id number]  Today's Date: 4/15/2017  Onset of Illness/Injury or Date of Surgery Date: 17  Date of Referral to OT: 04/15/17  Referring Physician: Dr. Locke    Admit Date: 2017       ICD-10-CM ICD-9-CM   1. Hip fracture, right, closed, initial encounter S72.001A 820.8   2. Impaired physical mobility Z74.09 781.99   3. Impaired mobility and ADLs Z74.09 799.89     Patient Active Problem List   Diagnosis   • Generalized weakness   • SIADH (syndrome of inappropriate ADH production)   • HTN (hypertension)   • Osteoarthritis   • Ulcerative colitis   • Pulmonary nodules   • Iron deficiency anemia   • Bacterial UTI   • Altered mental status   • Hip fracture requiring operative repair   • Closed intertrochanteric fracture of right hip     Past Medical History:   Diagnosis Date   • Anxiety    • Arthritis    • Clostridium difficile infection    • Concussion    • Fall    • History of transfusion    • Hypertension    • Inappropriate vasopressin secretion syndrome    • Irregular heart rhythm    • TIA (transient ischemic attack)    • Ulcerative colitis      Past Surgical History:   Procedure Laterality Date   • CHOLECYSTECTOMY     • EYE SURGERY     • HIP TROCANTERIC NAILING WITH INTRAMEDULLARY HIP SCREW Left 2/15/2017    Procedure: HIP TROCANTERIC NAILING SHORT WITH INTRAMEDULLARY HIP SCREW;  Surgeon: Pastor Lee MD;  Location: [facility];  Service:    • HIP TROCANTERIC NAILING WITH INTRAMEDULLARY HIP SCREW Right 2017    Procedure: HIP TROCANTERIC NAILING SHORT WITH INTRAMEDULLARY HIP SCREW;  Surgeon: Gael Locke MD;  Location: [facility] OR;  Service:    • JOINT REPLACEMENT      BILATERAL KNEES AND LEFT HIP   • REPLACEMENT TOTAL KNEE BILATERAL            OT ASSESSMENT FLOWSHEET (last 72 hours)      OT Evaluation       04/15/17 0751 04/15/17 0748 17 1017 17 2233 17 2230    Rehab  Evaluation    Document Type evaluation  -KR evaluation   see MAR  -AC       Subjective Information agree to therapy;complains of;pain;fatigue  -KR agree to therapy;complains of;pain;fatigue  -AC       Patient Effort, Rehab Treatment  poor  -AC       Symptoms Noted During/After Treatment  increased pain;other (see comments)  -AC       Symptoms Noted Comment  RN gave pain meds  -       General Information    Patient Profile Review yes  -KR yes  -AC       Onset of Illness/Injury or Date of Surgery Date 04/13/17  -KR 04/13/17  -AC       Referring Physician Dr. Locke  -KR Dr. Locke  -       General Observations 3L NC, continuous pulse ox, IV; lying supine easisly aroused, but does not stay this way   Simultaneous filing. User may be unaware of other data.  -KR lying supine in bed, O2 sat monitor, 3L O2 per nc, lethargic  -AC       Pertinent History Of Current Problem Fall with R hip pain; R hip fracature s/p IM nailing  -KR        Precautions/Limitations fall precautions  -KR fall precautions  -AC       Prior Level of Function --   reports using walking for ambulation; unable to determine   -KR        Equipment Currently Used at Home   other (see comments)   All needed DME provided at [facility]  walker, rolling  -[facility]    Plans/Goals Discussed With patient;agreed upon  -KR patient;agreed upon  -AC       Risks Reviewed patient:;LOB;increased discomfort  -KR patient:;LOB;nausea/vomiting;dizziness;increased discomfort;change in vital signs;increased drainage;lines disloged  -AC       Benefits Reviewed patient:;improve function;increase independence;increase strength;increase balance;decrease pain  -KR patient:;improve function;increase independence;increase strength;increase balance;decrease pain;increase knowledge  -AC       Barriers to Rehab cognitive status;previous functional deficit  -KR previous functional deficit;cognitive status  -AC       Living Environment    Lives With facility resident  -KR  facility  resident  - facility resident   [facility]     Living Arrangements --   current resident at [place]  -  other (see comments)   [facility] nursing [facility]  - residential [facility]     Home Accessibility    no concerns  -[facility]     Stair Railings at Home    none  -[facility]     Type of Financial/Environmental Concern    none  -[facility]     Transportation Available   ambulance  - ambulance  -[facility]     Clinical Impression    Date of Referral to OT  04/15/17  -AC       OT Diagnosis  decreased ADL  -AC       Prognosis  good  -AC       Impairments Found (describe specific impairments)  arousal, attention, and cognition;ergonomics and body mechanics;gait, locomotion, and balance;joint integrity and mobility;muscle performance;ROM  -AC       Criteria for Skilled Therapeutic Interventions Met  yes;treatment indicated  -       Rehab Potential  good, to achieve stated therapy goals  -       Therapy Frequency  per priority policy  -       Predicted Duration of Therapy Intervention (days/wks)  10 days  -       Anticipated Discharge Disposition  skilled nursing [facility]  -       Functional Level Prior    Ambulation     0-->independent  -KRA    Transferring     0-->independent  -KRA    Toileting     0-->independent  -KRA    Bathing     0-->independent  -KRA    Dressing     0-->independent  -KRA    Eating     0-->independent  -KRA    Communication     0-->understands/communicates without difficulty  -KRA    Swallowing     0-->swallows foods/liquids without difficulty  -KRA    Vital Signs    Pretreatment Heart Rate (beats/min) 91  -KR 86  -AC       Pre SpO2 (%) 97  -KR 97  -AC       O2 Delivery Pre Treatment supplemental O2   3L NC  -KR supplemental O2   3L O2 per nc  -AC       Pre Patient Position Supine  -KR Supine  -AC       Pain Assessment    Pain Assessment 0-10  -KR [place] FACES  -AC       [place] FACES Pain Rating  2  -AC       Pain Score 8  -KR        Pain Type Acute pain  -KR Acute pain  -AC        Pain Location Hip  -KR Throat  -AC       Pain Orientation Right  -KR        Pain Descriptors  Sore  -AC       Pain Frequency Intermittent  -KR        Pain Intervention(s) Repositioned;Medication (See MAR);MD notified (Comment)  -KR        Response to Interventions tolerated  -KR        Multiple Pain Sites Yes  -KR Yes  -AC       Pain 2    Pain Score 2 --   2 on enamorado baker  -KR 8  -AC       Pre Tx Pain Score 2  8  -AC       Pain Type 2 Acute pain  -KR Acute pain  -AC       Pain Location 2 Throat  -KR Hip  -AC       Pain Orientation 2  Right  -AC       Pain Intervention(s) 2 Other (Comment)   gave water  -KR Medication (See MAR);Repositioned;Ambulation/increased activity   RN gave meds post eval  -AC       Cognitive Assessment/Intervention    Current Cognitive/Communication Assessment impaired  -KR functional  -AC       Orientation Status oriented to;person;place;situation  -KR oriented x 4;other (see comments)   not oriented to time,   -AC       Follows Commands/Answers Questions 100% of the time;able to follow multi-step instructions;needs increased time;needs cueing  -% of the time;able to follow multi-step instructions  -AC       Personal Safety  other (see comments)   slightly disoriented but no apparent safety issues  -AC       Personal Safety Interventions elopement precautions initiated;fall prevention program maintained;nonskid shoes/slippers when out of bed;supervised activity  -KR elopement precautions initiated;fall prevention program maintained;gait belt;nonskid shoes/slippers when out of bed;supervised activity  -AC       ROM (Range of Motion)    General ROM Detail limited 50-75% on RLE by pain; WFL LLE; see OT for UEss  -KR R shoulder impaired 75% due to rotator cuff injury, LUE WFL AROM  -AC       MMT (Manual Muscle Testing)    General MMT Assessment Detail unable to fully assess  -KR functionaly 4/5 B  -AC       Mobility Assessment/Training    Extremity Weight-Bearing Status right upper  "extremity  -KR right lower extremity  -AC       Right Lower Extremity Weight-Bearing weight-bearing as tolerated  -KR weight-bearing as tolerated  -AC       Bed Mobility, Assessment/Treatment    Bed Mobility, Assistive Device draw sheet;bed rails;head of bed elevated  -KR bed rails;head of bed elevated;draw sheet  -AC       Bed Mobility, Roll Left, [place] maximum assist (25% patient effort);2 person assist required  -KR maximum assist (25% patient effort);2 person assist required  -AC       Bed Mobility, Roll Right, [place] maximum assist (25% patient effort);2 person assist required  -KR maximum assist (25% patient effort);2 person assist required  -AC       Bed Mobility, Scoot/Bridge, [place] maximum assist (25% patient effort);2 person assist required  -KR maximum assist (25% patient effort);2 person assist required  -AC       Bed Mob, Supine to Sit, [place] maximum assist (25% patient effort);2 person assist required  -KR maximum assist (25% patient effort);2 person assist required  -AC       Bed Mob, Sit to Supine, [place] maximum assist (25% patient effort);2 person assist required  -KR maximum assist (25% patient effort);2 person assist required  -AC       Bed Mobility, Safety Issues decreased use of arms for pushing/pulling;decreased use of legs for bridging/pushing  -KR decreased use of arms for pushing/pulling;decreased use of legs for bridging/pushing  -AC       Bed Mobility, Impairments ROM decreased;pain;strength decreased  -KR pain;strength decreased;ROM decreased  -AC       Bed Mobility, Comment kept saying \"I can't I can't\"   -KR she was anxious about bed moblity and in pain stating, \"I can't, I can't\" over and over; needed lots of encouragement  -AC       Transfer Assessment/Treatment    Transfer, Comment unable to attempt  -KR not attempted due to weakness  -AC       Functional Mobility    Functional Mobility- Comment  not attempted due to weakness  -AC       Lower Body " Dressing Assessment/Training    LB Dressing Assess/Train, Clothing Type  donning:;doffing:;socks   undergarment  -AC       LB Dressing Assess/Train, Position  edge of bed  -AC       LB Dressing Assess/Train, [place]  maximum assist (25% patient effort)  -       Motor Skills/Interventions    Additional Documentation Balance Skills Training (Group)  -KR Balance Skills Training (Group)  -       Balance Skills Training    Sitting-Level of Assistance Contact guard;Close supervision;Minimum assistance   became very drowsey after pain med and leans L   -KR Close supervision;Contact guard   leaned L soon after coming to sit, became very lethargic  -       Sitting-Balance Support Right upper extremity supported;Left upper extremity supported;Feet supported  -KR Feet supported;Right upper extremity supported;Left upper extremity supported   after pain meds  -AC       Sensory Assessment/Intervention    Sensory Impairment --   unable to assess  -KR --   unable to assess  -       General Therapy Interventions    Planned Therapy Interventions  activity intolerance;ADL retraining;balance training;bed mobility training;transfer training  -       Positioning and Restraints    Pre-Treatment Position in bed  -KR in bed  -AC       Post Treatment Position bed  -KR bed  -AC       In Bed fowlers;call light within reach;encouraged to call for assist;with nsg  -KR fowlers;call light within reach;encouraged to call for assist;with nsg;side rails up x2;RLE elevated;LLE elevated  -         User Key  (r) = Recorded By, (t) = Taken By, (c) = Cosigned By    Initials Name Effective Dates     Naresh Lombardi, OTR/L 06/22/15 -     JOSE Gonzalez, PT DPT 06/19/15 -     PABLO Alvarado RN 08/02/16 -     REZA Stewart, BSW 09/15/16 -            Occupational Therapy Education     Title: PT OT SLP Therapies (Active)     Topic: Occupational Therapy (Done)     Point: ADL training (Done)    Description: Instruct learner(s) on  proper safety adaptation and remediation techniques during self care or transfers.   Instruct in proper use of assistive devices.    Learning Progress Summary    Learner Readiness Method Response Comment Documented by Status   Patient Acceptance E VU benefits of activity  04/15/17 0847 Done                      User Key     Initials Effective Dates Name Provider Type Discipline     06/22/15 -  Naresh Lombardi, OTR/L Occupational Therapist OT                  OT Recommendation and Plan  Anticipated Discharge Disposition: skilled nursing facility  Planned Therapy Interventions: activity intolerance, ADL retraining, balance training, bed mobility training, transfer training  Therapy Frequency: per priority policy  Plan of Care Review  Plan Of Care Reviewed With: patient  Progress: improving  Outcome Summary/Follow up Plan: OT araceli completed.  Pt very weak, anxious, lethargic, and disoriented.  She is able to follow 1 step commands 100% of the time.  MaxA x2 required for bed mobility.  Pt unable to stand due to weakness.  Dependent for LB dressing.  Pt would benefit from skilled OT to address decreased ADLs and mobility, specifically to work on transfers and BADL.  Pt will need to return to SNF at discharge.           OT Goals       04/15/17 0848          Transfer Training OT LTG    Transfer Training OT LTG, Date Established 04/15/17  -AC      Transfer Training OT LTG, Time to Achieve by discharge  -AC      Transfer Training OT LTG, Activity Type bed to chair /chair to bed;toilet  -AC      Transfer Training OT LTG, [place] Level moderate assist (50% patient effort)  -AC      Transfer Training OT LTG, Assist Device commode, bedside;walker, rolling  -AC      Bathing OT LTG    Bathing Goal OT LTG, Date Established 04/15/17  -AC      Bathing Goal OT LTG, Time to Achieve by discharge  -AC      Bathing Goal OT LTG, Activity Type lower body bathing  -AC      Bathing Goal OT LTG, [place] Level minimum assist (75%  patient effort)  -AC      Bathing Goal OT LTG, Assist Device shower chair with back;sponge, long handled;grab bars  -AC      Toileting OT LTG    Toileting Goal OT LTG, Date Established 04/15/17  -AC      Toileting Goal OT LTG, Time to Achieve by discharge  -AC      Toileting Goal OT LTG, [place] Level moderate assist (50% patient effort)  -AC      LB Dressing OT LTG    LB Dressing Goal OT LTG, Date Established 04/15/17  -AC      LB Dressing Goal OT LTG, Time to Achieve by discharge  -AC      LB Dressing Goal OT LTG, [place] Level moderate assist (50% patient effort)  -AC      LB Dressing Goal OT LTG, Adaptive Equipment dressing stick;laces, elastic;reacher;shoe horn, long handled;sock-aid  -AC        User Key  (r) = Recorded By, (t) = Taken By, (c) = Cosigned By    Initials Name Provider Type    KAILEE Lombardi, OTR/L Occupational Therapist                Outcome Measures       04/15/17 0843 04/15/17 0800       How much help from another person do you currently need...    Turning from your back to your side while in flat bed without using bedrails?  2  -KR     Moving from lying on back to sitting on the side of a flat bed without bedrails?  2  -KR     Moving to and from a bed to a chair (including a wheelchair)?  1  -KR     Standing up from a chair using your arms (e.g., wheelchair, bedside chair)?  1  -KR     Climbing 3-5 steps with a railing?  1  -KR     To walk in hospital room?  1  -KR     AM-PAC 6 Clicks Score  8  -KR     How much help from another is currently needed...    Putting on and taking off regular lower body clothing? 1  -AC      Bathing (including washing, rinsing, and drying) 1  -AC      Toileting (which includes using toilet bed pan or urinal) 1  -AC      Putting on and taking off regular upper body clothing 1  -AC      Taking care of personal grooming (such as brushing teeth) 2  -AC      Eating meals 2  -AC      Score 8  -AC      Functional Assessment    Outcome Measure Options AM-PAC  6 Clicks Daily Activity (OT)  -AC AM-PAC 6 Clicks Basic Mobility (PT)  -KR       User Key  (r) = Recorded By, (t) = Taken By, (c) = Cosigned By    Initials Name Provider Type    AC Naresh Lombardi, OTR/L Occupational Therapist    KR Ely Gonzalez, PT DPT Physical Therapist          Time Calculation:   OT Start Time: 0748  OT Stop Time: 0845  OT Time Calculation (min): 57 min    Therapy Charges for Today     Code Description Service Date Service Provider Modifiers Qty    [id number]  OT SELFCARE CURRENT 4/15/2017 Naresh Lombardi, OTR/L GO, CM 1    [id number]  OT SELFCARE PROJECTED 4/15/2017 Naresh Lombardi OTR/L GO, CL 1    [id number]  OT EVAL MOD COMPLEXITY 4 4/15/2017 Naresh Lombardi, OTR/L GO, KX 1          OT G-codes  OT Functional Scales Options: AM-PAC 6 Clicks Daily Activity (OT)  Score: 8  Functional Limitation: Self care  Self Care Current Status (): At least 80 percent but less than 100 percent impaired, limited or restricted  Self Care Goal Status (): At least 60 percent but less than 80 percent impaired, limited or restricted    Naresh Lombardi OTR/L  4/15/2017

## 2017-04-16 LAB
ANION GAP SERPL CALCULATED.3IONS-SCNC: 8 MMOL/L (ref 4–13)
BUN BLD-MCNC: 16 MG/DL (ref 5–21)
BUN/CREAT SERPL: 25 (ref 7–25)
CALCIUM SPEC-SCNC: 8.5 MG/DL (ref 8.4–10.4)
CHLORIDE SERPL-SCNC: 98 MMOL/L (ref 98–110)
CO2 SERPL-SCNC: 24 MMOL/L (ref 24–31)
CREAT BLD-MCNC: 0.64 MG/DL (ref 0.5–1.4)
DEPRECATED RDW RBC AUTO: 47.6 FL (ref 40–54)
ERYTHROCYTE [DISTWIDTH] IN BLOOD BY AUTOMATED COUNT: 13.6 % (ref 12–15)
GFR SERPL CREATININE-BSD FRML MDRD: 88 ML/MIN/1.73
GLUCOSE BLD-MCNC: 89 MG/DL (ref 70–100)
HCT VFR BLD AUTO: 26.5 % (ref 37–47)
HGB BLD-MCNC: 9.2 G/DL (ref 12–16)
INR PPP: 1.24 (ref 0.91–1.09)
MCH RBC QN AUTO: 32.9 PG (ref 28–32)
MCHC RBC AUTO-ENTMCNC: 34.7 G/DL (ref 33–36)
MCV RBC AUTO: 94.6 FL (ref 82–98)
PLATELET # BLD AUTO: 148 10*3/MM3 (ref 130–400)
PMV BLD AUTO: 10.4 FL (ref 6–12)
POTASSIUM BLD-SCNC: 4.1 MMOL/L (ref 3.5–5.3)
PROTHROMBIN TIME: 16 SECONDS (ref 11.9–14.6)
RBC # BLD AUTO: 2.8 10*6/MM3 (ref 4.2–5.4)
SODIUM BLD-SCNC: 130 MMOL/L (ref 135–145)
WBC NRBC COR # BLD: 11.4 10*3/MM3 (ref 4.8–10.8)

## 2017-04-16 PROCEDURE — 25010000002 HYDROMORPHONE PER 4 MG: Performed by: INTERNAL MEDICINE

## 2017-04-16 PROCEDURE — G8996 SWALLOW CURRENT STATUS: HCPCS

## 2017-04-16 PROCEDURE — 97530 THERAPEUTIC ACTIVITIES: CPT

## 2017-04-16 PROCEDURE — 85610 PROTHROMBIN TIME: CPT | Performed by: ORTHOPAEDIC SURGERY

## 2017-04-16 PROCEDURE — 97110 THERAPEUTIC EXERCISES: CPT

## 2017-04-16 PROCEDURE — 85027 COMPLETE CBC AUTOMATED: CPT | Performed by: INTERNAL MEDICINE

## 2017-04-16 PROCEDURE — 92610 EVALUATE SWALLOWING FUNCTION: CPT

## 2017-04-16 PROCEDURE — 80048 BASIC METABOLIC PNL TOTAL CA: CPT | Performed by: INTERNAL MEDICINE

## 2017-04-16 PROCEDURE — G8997 SWALLOW GOAL STATUS: HCPCS

## 2017-04-16 RX ORDER — ACETAMINOPHEN 325 MG/1
650 TABLET ORAL EVERY 4 HOURS PRN
Status: DISCONTINUED | OUTPATIENT
Start: 2017-04-16 | End: 2017-04-18 | Stop reason: HOSPADM

## 2017-04-16 RX ORDER — BISACODYL 10 MG
10 SUPPOSITORY, RECTAL RECTAL DAILY
Status: DISCONTINUED | OUTPATIENT
Start: 2017-04-16 | End: 2017-04-18 | Stop reason: HOSPADM

## 2017-04-16 RX ORDER — HYDROCODONE BITARTRATE AND ACETAMINOPHEN 5; 325 MG/1; MG/1
1 TABLET ORAL EVERY 6 HOURS PRN
Status: DISCONTINUED | OUTPATIENT
Start: 2017-04-16 | End: 2017-04-16

## 2017-04-16 RX ADMIN — ASPIRIN 81 MG: 81 TABLET ORAL at 08:23

## 2017-04-16 RX ADMIN — HYDROCODONE BITARTRATE AND ACETAMINOPHEN 1 TABLET: 7.5; 325 TABLET ORAL at 08:23

## 2017-04-16 RX ADMIN — ACETAMINOPHEN 650 MG: 325 TABLET ORAL at 16:27

## 2017-04-16 RX ADMIN — WARFARIN 2 MG: 2 TABLET ORAL at 16:27

## 2017-04-16 RX ADMIN — AZTREONAM 1 G: 1 INJECTION, POWDER, FOR SOLUTION INTRAMUSCULAR; INTRAVENOUS at 06:21

## 2017-04-16 RX ADMIN — SODIUM CHLORIDE, POTASSIUM CHLORIDE, SODIUM LACTATE AND CALCIUM CHLORIDE 20 ML/HR: 600; 310; 30; 20 INJECTION, SOLUTION INTRAVENOUS at 00:37

## 2017-04-16 RX ADMIN — AZTREONAM 1 G: 1 INJECTION, POWDER, FOR SOLUTION INTRAMUSCULAR; INTRAVENOUS at 14:50

## 2017-04-16 RX ADMIN — HYDROCODONE BITARTRATE AND ACETAMINOPHEN 1 TABLET: 7.5; 325 TABLET ORAL at 03:37

## 2017-04-16 RX ADMIN — METOPROLOL SUCCINATE 25 MG: 25 TABLET, FILM COATED, EXTENDED RELEASE ORAL at 08:23

## 2017-04-16 RX ADMIN — HYDROMORPHONE HYDROCHLORIDE 1 MG: 1 INJECTION, SOLUTION INTRAMUSCULAR; INTRAVENOUS; SUBCUTANEOUS at 20:38

## 2017-04-16 RX ADMIN — AZTREONAM 1 G: 1 INJECTION, POWDER, FOR SOLUTION INTRAMUSCULAR; INTRAVENOUS at 20:38

## 2017-04-16 RX ADMIN — BISACODYL 10 MG: 10 SUPPOSITORY RECTAL at 09:55

## 2017-04-16 RX ADMIN — DOCUSATE SODIUM 100 MG: 100 CAPSULE ORAL at 08:23

## 2017-04-16 NOTE — NURSING NOTE
"Spoke with Dr. Felipe regarding pt unable to void, need for a BM, need for speech consult, and questioning isolation status. Dr. Felipe updated on pt swallow worsening this AM, Dr. Felipe stated he would order a speech consult. Dr. Felipe updated on pt not having a BM, he stated he would order a suppository. Also updated him on pt inability to void, he stated to continue in and out cath throughout the day and then he would reassess this afternoon. 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 Dr. Felipe stated \"She doesn't need to be in contact precautions this admit, she has e coli in her urine that does not need isolation precautions.\" Will continue to monitor.  "

## 2017-04-16 NOTE — PLAN OF CARE
Problem: Inpatient SLP  Goal: Dysphagia- Patient will improve swallowing skills to begin to take some PO safely  Outcome: Ongoing (interventions implemented as appropriate)    04/16/17 1338   Begin to Take Some PO Safely   Begin to Take Some PO Safely- SLP, Date Established 04/16/17   Begin to Take Some PO Safely- SLP, Time to Achieve by discharge   Begin to Take Some PO Safely- SLP, Activity Level Patient will improve oral skills for more efficient eating;Patient will improve timing of pharyngeal response for safer, more efficient swallow;Patient will improve tongue base/pharyngeal wall squeeze for safer, more efficient swallow   Begin to Take Some PO Safely- SLP, Additional Goal Pt will tolerate recommended diet as well as trials of upgraded diet consistencies w/o any overt s/s of aspiration.   Begin to Take Some PO Safely- SLP, Outcome goal ongoing

## 2017-04-16 NOTE — PROGRESS NOTES
Acute Care - Speech Language Pathology Initial Evaluation  [facility]     Patient Name: Tracee Neal  : 4/15/1932  MRN: [id number]  Today's Date: 2017  Onset of Illness/Injury or Date of Surgery Date: 17     Referring Physician: Dr. Felipe      Admit Date: 2017     SPEECH-LANGUAGE PATHOLOGY EVALUATION - SWALLOW  Subjective: The patient was seen on this date for a Clinical Swallow evaluation.  Patient was somnolent.    Objective: Textures given included thin liquid and puree consistency.  Assessment: Difficulties were noted with thin liquid and puree consistency. Pt was very letharghic upon ST arrival and required max verbal and tactile cues to open her eyes. The pt stuck her tongue out 1x in which ice chip stimulation was applied and the pt responded with a very delayed and weak swallow. The pt was presented with an ice chip and spoon coated in applesauce several times, however even with max verbal and tactile cues the pt would not close her mouth around the spoon to complete PO intake.   SLP Findings:  Patient presents with severe to profound oropharyngeal dysphagia, without esophageal component.   Recommendations: Diet Textures: NPO. Medications should be taken  by alternate means.   Other Recommended Evaluations: Re-evaluation at bedside    Dysphagia therapy is recommended.   Magnus Luong MS, CFY-SLP 2017 1:48 PM    Visit Dx:    ICD-10-CM ICD-9-CM   1. Hip fracture, right, closed, initial encounter S72.001A 820.8   2. Impaired physical mobility Z74.09 781.99   3. Impaired mobility and ADLs Z74.09 799.89   4. Oropharyngeal dysphagia R13.12 787.22     Patient Active Problem List   Diagnosis   • Generalized weakness   • SIADH (syndrome of inappropriate ADH production)   • HTN (hypertension)   • Osteoarthritis   • Ulcerative colitis   • Pulmonary nodules   • Iron deficiency anemia   • Bacterial UTI   • Altered mental status   • Hip fracture requiring operative repair   • Closed  intertrochanteric fracture of right hip     Past Medical History:   Diagnosis Date   • Anxiety    • Arthritis    • Clostridium difficile infection    • Concussion    • Fall    • History of transfusion    • Hypertension    • Inappropriate vasopressin secretion syndrome    • Irregular heart rhythm    • TIA (transient ischemic attack)    • Ulcerative colitis      Past Surgical History:   Procedure Laterality Date   • CHOLECYSTECTOMY     • EYE SURGERY     • HIP TROCANTERIC NAILING WITH INTRAMEDULLARY HIP SCREW Left 2/15/2017    Procedure: HIP TROCANTERIC NAILING SHORT WITH INTRAMEDULLARY HIP SCREW;  Surgeon: Pastor Lee MD;  Location: [facility] OR;  Service:    • HIP TROCANTERIC NAILING WITH INTRAMEDULLARY HIP SCREW Right 2017    Procedure: HIP TROCANTERIC NAILING SHORT WITH INTRAMEDULLARY HIP SCREW;  Surgeon: Gael Locke MD;  Location: [facility] OR;  Service:    • JOINT REPLACEMENT      BILATERAL KNEES AND LEFT HIP   • REPLACEMENT TOTAL KNEE BILATERAL              EDUCATION  The patient has been educated in the following areas:   Dysphagia (Swallowing Impairment).    SLP Recommendation and Plan                               Plan of Care Review  Plan Of Care Reviewed With: patient  Progress: no change (Initial evaluation.)  Outcome Summary/Follow up Plan: CBSE completed. ST recommends pt be placed NPO at this time due to her lethargic state and inability to complete PO intake. If the pt or MD wish to be aggressive ST recommends a -chris tube at this time. Meds to be administered via alternate route. ST will continue to follow.          IP SLP Goals       17 1338          Begin to Take Some PO Safely    Begin to Take Some PO Safely- SLP, Date Established 17  -CS      Begin to Take Some PO Safely- SLP, Time to Achieve by discharge  -CS      Begin to Take Some PO Safely- SLP, Activity Level Patient will improve oral skills for more efficient eating;Patient will improve timing of pharyngeal response for  safer, more efficient swallow;Patient will improve tongue base/pharyngeal wall squeeze for safer, more efficient swallow  -CS      Begin to Take Some PO Safely- SLP, Additional Goal Pt will tolerate recommended diet as well as trials of upgraded diet consistencies w/o any overt s/s of aspiration.  -CS      Begin to Take Some PO Safely- SLP, Outcome goal ongoing  -CS        User Key  (r) = Recorded By, (t) = Taken By, (c) = Cosigned By    Initials Name Provider Type    PAVEL Luong MS, CFY-SLP Speech and Language Pathologist             SLP Outcome Measures (last 72 hours)      SLP Outcome Measures       04/16/17 1300          SLP Outcome Measures    Outcome Measure Used? Adult NOMS  -CS      OTHER    SLP Diagnoses Dysphagia  -CS      FCM Scores    FCM Chosen Swallowing  -CS      Swallowing FCM Score 2  -CS        User Key  (r) = Recorded By, (t) = Taken By, (c) = Cosigned By    Initials Name Effective Dates     Magnus Luong MS, HAY-SLP 08/02/16 -           Time Calculation:         Time Calculation- SLP       04/16/17 1342          Time Calculation- SLP    SLP Start Time 1134  -CS      SLP Stop Time 1250  -CS      SLP Time Calculation (min) 76 min  -CS      SLP Received On 04/16/17  -CS      SLP Goal Re-Cert Due Date 04/26/17  -CS        User Key  (r) = Recorded By, (t) = Taken By, (c) = Cosigned By    Initials Name Provider Type    PAVEL Luong MS, CFY-SLP Speech and Language Pathologist          Therapy Charges for Today     Code Description Service Date Service Provider Modifiers Qty    02516570755 HC ST SWALLOWING CURRENT STATUS 4/16/2017 Magnus Luong MS, CFY-SLP GN, CM 1    08456463850 HC ST SWALLOWING PROJECTED 4/16/2017 Magnus Luong MS, CFY-SLP GN, CK 1    77940368600 HC ST EVAL ORAL PHARYNG SWALLOW 5 4/16/2017 Magnus Luong MS, CFY-SLP CAROL KX 1             ADULT NOMS (last 72 hours)      Adult NOMS       04/16/17 1300                OTHER    SLP Diagnoses Dysphagia  -CS        FCM Scores    FCM  Chosen Swallowing  -CS        Swallowing FCM Score 2  -CS          User Key  (r) = Recorded By, (t) = Taken By, (c) = Cosigned By    Initials Name Effective Dates    CS Magnus Luong MS, CFY-SLP 08/02/16 -         SLP G-Codes  SLP NOMS Used?: Yes  Functional Limitations: Swallowing  Swallow Current Status (): At least 80 percent but less than 100 percent impaired, limited or restricted  Swallow Goal Status (): At least 40 percent but less than 60 percent impaired, limited or restricted      Magnus Luong MS, CFY-SLP  4/16/2017

## 2017-04-16 NOTE — PROGRESS NOTES
"    [facility] Medicine Services  INPATIENT PROGRESS NOTE    Length of Stay: 3  Date of Admission: 4/13/2017  Primary Care Physician: Pascual Patten MD    Subjective   Chief Complaint: Lethargy; difficulty swallowing  Fall   Associated symptoms include vomiting.   Vomiting      Patient received two pain pills earlier this AM (3AM and 8AM) and then became more somnolent and lethargic with difficulty taking PO.  Speech therapy evaluated at that time and recommended making patient NPO.  Currently, patient more awake and reporting pain in her right hip.  When asked why she was having difficulty swallowing earlier she reports \"I don't know\".  She denies dyspnea.  She continues to have trouble with urinary retention per nursing as well.    Review of Systems   Gastrointestinal: Positive for vomiting.        All pertinent negatives and positives are as above. All other systems have been reviewed and are negative unless otherwise stated.     Objective    Temp:  [96.3 °F (35.7 °C)-99.6 °F (37.6 °C)] 97.6 °F (36.4 °C)  Heart Rate:  [] 81  Resp:  [18-20] 18  BP: ()/(41-65) 107/61  Physical Exam   Constitutional: She appears well-developed. No distress.   Awake and reports that she's in pain (hip)   HENT:   Head: Normocephalic.   Mouth/Throat: No oropharyngeal exudate.   Eyes: Pupils are equal, round, and reactive to light. No scleral icterus.   Cardiovascular: Normal rate and regular rhythm.    Pulmonary/Chest: Effort normal.   Abdominal: Soft. She exhibits no distension.   Skin: Skin is warm and dry.   Vitals reviewed.    Results Review:  I have reviewed the labs, radiology results, and diagnostic studies.    Laboratory Data:     Results from last 7 days  Lab Units 04/16/17  0459 04/15/17  0536 04/13/17  1934   WBC 10*3/mm3 11.40*  --  10.40   HEMOGLOBIN g/dL 9.2* 10.5* 12.2   HEMATOCRIT % 26.5* 30.9* 35.4*   PLATELETS 10*3/mm3 148  --  266          Results from last 7 " days  Lab Units 04/16/17  0459 04/15/17  0536 04/14/17  0602 04/13/17  1934   SODIUM mmol/L 130* 131* 134* 131*   POTASSIUM mmol/L 4.1 4.3 3.8 3.6   CHLORIDE mmol/L 98 97* 96* 94*   TOTAL CO2 mmol/L 24.0 24.0 25.0 24.0   BUN mg/dL 16 13 13 15   CREATININE mg/dL 0.64 0.67 0.68 0.85   CALCIUM mg/dL 8.5 8.6 9.0 9.4   BILIRUBIN mg/dL  --   --   --  0.6   ALK PHOS U/L  --   --   --  93   ALT (SGPT) U/L  --   --   --  20   AST (SGOT) U/L  --   --   --  23   GLUCOSE mg/dL 89 105* 112* 100       Culture Data:   Urine Culture   Date Value Ref Range Status   04/13/2017 >100,000 CFU/mL Escherichia coli (A)  Final       Radiology Data:   Imaging Results (last 24 hours)     Procedure Component Value Units Date/Time    XR Hip With or Without Pelvis 2 - 3 View Right [[id number]] Collected:  04/14/17 1419     Updated:  04/16/17 0928    Narrative:       EXAMINATION: XR HIP W OR WO PELVIS 2-3 VIEW RIGHT-     4/14/2017 2:30 PM EDT     HISTORY: tfn; S72.001A-Fracture of unspecified part of neck of right  femur, initial encounter for closed fracture     Intraoperative spot images document hardware fixation of an  intertrochanteric right hip fracture.     Number of images obtained = 4.  Fluoroscopy time = 46 seconds.        This report was finalized on 04/14/2017 14:20 by Dr. Jonathon Gómez MD.    FL C Arm During Surgery [[id number]] Collected:  04/14/17 1419     Updated:  04/16/17 0928    Narrative:       EXAMINATION: XR HIP W OR WO PELVIS 2-3 VIEW RIGHT-     4/14/2017 2:30 PM EDT     HISTORY: tfn; S72.001A-Fracture of unspecified part of neck of right  femur, initial encounter for closed fracture     Intraoperative spot images document hardware fixation of an  intertrochanteric right hip fracture.     Number of images obtained = 4.  Fluoroscopy time = 46 seconds.        This report was finalized on 04/14/2017 14:20 by Dr. Jonathon Gómez MD.          I have reviewed the patient current medications.     Assessment/Plan     Hospital Problem  List     * (Principal)Closed intertrochanteric fracture of right hip        Assessment:  1.  Right intertrochanteric hip fracture POD #2  2.  UTI - Urine culture with E. Coli  3.  Hyponatremia - history of SIADH  4.  Anemia with iron deficiency  5.  Hypertension  6.  Recent history of left hip fracture repair  7.  History of Wegener's granulomatosis  8.  Urinary Retention  9.  Dysphagia    Plan:  1.  IV Azactam day 3 (allergies to PCN, cephalosporins, and fluoroquinolones)  2.  Patient was more sleepy and lethargic earlier this AM; failed speech therapy eval and currently NPO.  Per nursing, she received a pain pill earlier which may have contributed to her lethargy.  She appears to be improving now.  Talked with nursing about trying some ice chips to see how she tolerates, and will try to minimize any sedating meds.  Likely will have to use Tylenol only for pain  3.  Coumadin for DVT PPx per Ortho; trend PT/INR  4.  PT/OT  5.  Bladder scan and I/O cath as needed for urinary retention  6.  Bowel regimen  7.  Labs reviewed; currently stable    Isaac Felipe MD   04/16/17   2:13 PM

## 2017-04-16 NOTE — PLAN OF CARE
Problem: Patient Care Overview (Adult)  Goal: Plan of Care Review  Outcome: Ongoing (interventions implemented as appropriate)    17 8718   Coping/Psychosocial Response Interventions   Plan Of Care Reviewed With patient   Patient Care Overview   Progress no change  (Initial evaluation.)   Outcome Evaluation   Outcome Summary/Follow up Plan CBSE completed. ST recommends pt be placed NPO at this time due to her lethargic state and inability to complete PO intake. If the pt or MD wish to be aggressive ST recommends a -chris tube at this time. Meds to be administered via alternate route. ST will continue to follow.

## 2017-04-16 NOTE — PLAN OF CARE
"Problem: Patient Care Overview (Adult)  Goal: Plan of Care Review  Outcome: Ongoing (interventions implemented as appropriate)    04/16/17 1107   Coping/Psychosocial Response Interventions   Plan Of Care Reviewed With patient   Patient Care Overview   Progress no change   Outcome Evaluation   Outcome Summary/Follow up Plan Pt needs lots of encouragement to sit EOB, cont to say \"i cant\" Max x2 using draw sheet to sit EOB. WOrked on sitting balance pt tends to lean Left due to pain in Right hip. Attempt doing LE ex's but cont to holler in pain so did PROm x10           "

## 2017-04-16 NOTE — PLAN OF CARE
Problem: Patient Care Overview (Adult)  Goal: Plan of Care Review  Outcome: Ongoing (interventions implemented as appropriate)    04/16/17 1655   Coping/Psychosocial Response Interventions   Plan Of Care Reviewed With patient   Patient Care Overview   Progress no change   Outcome Evaluation   Outcome Summary/Follow up Plan pt is npo d/t being to drowsy to swallow. pt is confused and randomly screams out. all narcotics d/c and tylenol has been ordered. suppository given this AM and the patient has had a BM. continue to do in and out cath.        Goal: Adult Individualization and Mutuality  Outcome: Ongoing (interventions implemented as appropriate)  Goal: Discharge Needs Assessment  Outcome: Ongoing (interventions implemented as appropriate)    Problem: Fractured Hip (Adult)  Goal: Signs and Symptoms of Listed Potential Problems Will be Absent or Manageable (Fractured Hip)  Outcome: Ongoing (interventions implemented as appropriate)    Problem: Fall Risk (Adult)  Goal: Absence of Falls  Outcome: Ongoing (interventions implemented as appropriate)    Problem: Pressure Ulcer (Adult)  Goal: Signs and Symptoms of Listed Potential Problems Will be Absent or Manageable (Pressure Ulcer)  Outcome: Outcome(s) achieved Date Met:  04/16/17    Problem: Skin Integrity Impairment, Risk/Actual (Adult)  Goal: Identify Related Risk Factors and Signs and Symptoms  Outcome: Outcome(s) achieved Date Met:  04/16/17  Goal: Skin Integrity/Wound Healing  Outcome: Ongoing (interventions implemented as appropriate)

## 2017-04-16 NOTE — PLAN OF CARE
Problem: Patient Care Overview (Adult)  Goal: Plan of Care Review  Outcome: Ongoing (interventions implemented as appropriate)    04/16/17 0512   Coping/Psychosocial Response Interventions   Plan Of Care Reviewed With patient   Patient Care Overview   Progress no change   Outcome Evaluation   Outcome Summary/Follow up Plan Pt has s/s of pain, then will state no pain. Prn given x 1. Still DTV, last bladder scan was 404cc, i/o cath of 300 cc. pt also had creamy white vaginal discharge w/no odor. Drsg is d/i.          Problem: Fractured Hip (Adult)  Goal: Signs and Symptoms of Listed Potential Problems Will be Absent or Manageable (Fractured Hip)  Outcome: Ongoing (interventions implemented as appropriate)    Problem: Pain, Acute (Adult)  Goal: Identify Related Risk Factors and Signs and Symptoms  Outcome: Ongoing (interventions implemented as appropriate)  Goal: Acceptable Pain Control/Comfort Level  Outcome: Ongoing (interventions implemented as appropriate)    Problem: Perioperative Period (Adult)  Goal: Signs and Symptoms of Listed Potential Problems Will be Absent or Manageable (Perioperative Period)  Outcome: Ongoing (interventions implemented as appropriate)    Problem: Fall Risk (Adult)  Goal: Absence of Falls  Outcome: Ongoing (interventions implemented as appropriate)    Problem: Pressure Ulcer (Adult)  Goal: Signs and Symptoms of Listed Potential Problems Will be Absent or Manageable (Pressure Ulcer)  Outcome: Ongoing (interventions implemented as appropriate)    Problem: Skin Integrity Impairment, Risk/Actual (Adult)  Goal: Identify Related Risk Factors and Signs and Symptoms  Outcome: Ongoing (interventions implemented as appropriate)  Goal: Skin Integrity/Wound Healing  Outcome: Ongoing (interventions implemented as appropriate)

## 2017-04-17 LAB
ABO + RH BLD: NORMAL
ABO + RH BLD: NORMAL
ANION GAP SERPL CALCULATED.3IONS-SCNC: 6 MMOL/L (ref 4–13)
BH BB BLOOD EXPIRATION DATE: NORMAL
BH BB BLOOD EXPIRATION DATE: NORMAL
BH BB BLOOD TYPE BARCODE: 5100
BH BB BLOOD TYPE BARCODE: 5100
BH BB DISPENSE STATUS: NORMAL
BH BB DISPENSE STATUS: NORMAL
BH BB PRODUCT CODE: NORMAL
BH BB PRODUCT CODE: NORMAL
BH BB UNIT NUMBER: NORMAL
BH BB UNIT NUMBER: NORMAL
BUN BLD-MCNC: 15 MG/DL (ref 5–21)
BUN/CREAT SERPL: 23.4 (ref 7–25)
CALCIUM SPEC-SCNC: 8.3 MG/DL (ref 8.4–10.4)
CHLORIDE SERPL-SCNC: 98 MMOL/L (ref 98–110)
CO2 SERPL-SCNC: 26 MMOL/L (ref 24–31)
CREAT BLD-MCNC: 0.64 MG/DL (ref 0.5–1.4)
CROSSMATCH INTERPRETATION: NORMAL
CROSSMATCH INTERPRETATION: NORMAL
DEPRECATED RDW RBC AUTO: 47.5 FL (ref 40–54)
ERYTHROCYTE [DISTWIDTH] IN BLOOD BY AUTOMATED COUNT: 13.7 % (ref 12–15)
GFR SERPL CREATININE-BSD FRML MDRD: 88 ML/MIN/1.73
GLUCOSE BLD-MCNC: 69 MG/DL (ref 70–100)
HCT VFR BLD AUTO: 25.4 % (ref 37–47)
HGB BLD-MCNC: 8.7 G/DL (ref 12–16)
INR PPP: 1.25 (ref 0.91–1.09)
MCH RBC QN AUTO: 32.7 PG (ref 28–32)
MCHC RBC AUTO-ENTMCNC: 34.3 G/DL (ref 33–36)
MCV RBC AUTO: 95.5 FL (ref 82–98)
PLATELET # BLD AUTO: 162 10*3/MM3 (ref 130–400)
PMV BLD AUTO: 10.3 FL (ref 6–12)
POTASSIUM BLD-SCNC: 3.9 MMOL/L (ref 3.5–5.3)
PROTHROMBIN TIME: 16.1 SECONDS (ref 11.9–14.6)
RBC # BLD AUTO: 2.66 10*6/MM3 (ref 4.2–5.4)
SODIUM BLD-SCNC: 130 MMOL/L (ref 135–145)
UNIT  ABO: NORMAL
UNIT  ABO: NORMAL
UNIT  RH: NORMAL
UNIT  RH: NORMAL
WBC NRBC COR # BLD: 8.34 10*3/MM3 (ref 4.8–10.8)

## 2017-04-17 PROCEDURE — 97110 THERAPEUTIC EXERCISES: CPT

## 2017-04-17 PROCEDURE — G8997 SWALLOW GOAL STATUS: HCPCS | Performed by: SPEECH-LANGUAGE PATHOLOGIST

## 2017-04-17 PROCEDURE — G8998 SWALLOW D/C STATUS: HCPCS | Performed by: SPEECH-LANGUAGE PATHOLOGIST

## 2017-04-17 PROCEDURE — 92526 ORAL FUNCTION THERAPY: CPT | Performed by: SPEECH-LANGUAGE PATHOLOGIST

## 2017-04-17 PROCEDURE — 80048 BASIC METABOLIC PNL TOTAL CA: CPT | Performed by: INTERNAL MEDICINE

## 2017-04-17 PROCEDURE — 85610 PROTHROMBIN TIME: CPT | Performed by: ORTHOPAEDIC SURGERY

## 2017-04-17 PROCEDURE — 25010000002 HYDROMORPHONE PER 4 MG: Performed by: INTERNAL MEDICINE

## 2017-04-17 PROCEDURE — 85027 COMPLETE CBC AUTOMATED: CPT | Performed by: INTERNAL MEDICINE

## 2017-04-17 PROCEDURE — 97530 THERAPEUTIC ACTIVITIES: CPT

## 2017-04-17 RX ORDER — NYSTATIN 100000 [USP'U]/G
POWDER TOPICAL EVERY 12 HOURS SCHEDULED
Status: DISCONTINUED | OUTPATIENT
Start: 2017-04-17 | End: 2017-04-18 | Stop reason: HOSPADM

## 2017-04-17 RX ORDER — HYDROCODONE BITARTRATE AND ACETAMINOPHEN 5; 325 MG/1; MG/1
0.5 TABLET ORAL EVERY 6 HOURS PRN
Status: DISCONTINUED | OUTPATIENT
Start: 2017-04-17 | End: 2017-04-18 | Stop reason: HOSPADM

## 2017-04-17 RX ADMIN — WARFARIN 2 MG: 2 TABLET ORAL at 17:30

## 2017-04-17 RX ADMIN — AZTREONAM 1 G: 1 INJECTION, POWDER, FOR SOLUTION INTRAMUSCULAR; INTRAVENOUS at 05:32

## 2017-04-17 RX ADMIN — HYDROMORPHONE HYDROCHLORIDE 0.5 MG: 1 INJECTION, SOLUTION INTRAMUSCULAR; INTRAVENOUS; SUBCUTANEOUS at 10:03

## 2017-04-17 RX ADMIN — DOCUSATE SODIUM 100 MG: 100 CAPSULE ORAL at 17:30

## 2017-04-17 RX ADMIN — AZTREONAM 1 G: 1 INJECTION, POWDER, FOR SOLUTION INTRAMUSCULAR; INTRAVENOUS at 15:37

## 2017-04-17 RX ADMIN — NYSTATIN: 100000 POWDER TOPICAL at 21:15

## 2017-04-17 RX ADMIN — MESALAMINE 800 MG: 800 TABLET, DELAYED RELEASE ORAL at 10:02

## 2017-04-17 RX ADMIN — SODIUM CHLORIDE, POTASSIUM CHLORIDE, SODIUM LACTATE AND CALCIUM CHLORIDE 20 ML/HR: 600; 310; 30; 20 INJECTION, SOLUTION INTRAVENOUS at 05:32

## 2017-04-17 RX ADMIN — DOCUSATE SODIUM 100 MG: 100 CAPSULE ORAL at 10:03

## 2017-04-17 RX ADMIN — MESALAMINE 800 MG: 800 TABLET, DELAYED RELEASE ORAL at 19:18

## 2017-04-17 RX ADMIN — ASPIRIN 81 MG: 81 TABLET ORAL at 10:03

## 2017-04-17 RX ADMIN — METOPROLOL SUCCINATE 25 MG: 25 TABLET, FILM COATED, EXTENDED RELEASE ORAL at 10:03

## 2017-04-17 RX ADMIN — AZTREONAM 1 G: 1 INJECTION, POWDER, FOR SOLUTION INTRAMUSCULAR; INTRAVENOUS at 21:15

## 2017-04-17 NOTE — PLAN OF CARE
Problem: Patient Care Overview (Adult)  Goal: Plan of Care Review  Outcome: Ongoing (interventions implemented as appropriate)    04/17/17 0847   Coping/Psychosocial Response Interventions   Plan Of Care Reviewed With patient   Patient Care Overview   Progress improving   Outcome Evaluation   Outcome Summary/Follow up Plan Pt more alert today. Given full range of PO consistencies. Pt developed hicc-ups during trials. No overt s/s of aspiraton seen with full range of consistencies. Would not take much water. Some throat clearing with cracker. Asked for water to help clear. No other overt s/s of aspiration. Poor limited dentition. Ok for [facility] sot diet with thin liquids. Patient appears at baseline. ST services not warranted.          Problem: Inpatient SLP  Goal: Dysphagia- Patient will improve swallowing skills to begin to take some PO safely  Outcome: Outcome(s) achieved Date Met:  04/17/17 04/16/17 1338 04/17/17 0847   Begin to Take Some PO Safely   Begin to Take Some PO Safely- SLP, Date Established 04/16/17 --    Begin to Take Some PO Safely- SLP, Time to Achieve by discharge --    Begin to Take Some PO Safely- SLP, Activity Level Patient will improve oral skills for more efficient eating;Patient will improve timing of pharyngeal response for safer, more efficient swallow;Patient will improve tongue base/pharyngeal wall squeeze for safer, more efficient swallow --    Begin to Take Some PO Safely- SLP, Additional Goal Pt will tolerate recommended diet as well as trials of upgraded diet consistencies w/o any overt s/s of aspiration. --    Begin to Take Some PO Safely- SLP, Date Goal Reviewed --  04/17/17   Begin to Take Some PO Safely- SLP, Outcome --  goal met

## 2017-04-17 NOTE — PLAN OF CARE
Problem: Patient Care Overview (Adult)  Goal: Plan of Care Review  Outcome: Ongoing (interventions implemented as appropriate)  Goal: Adult Individualization and Mutuality  Outcome: Ongoing (interventions implemented as appropriate)  Goal: Discharge Needs Assessment  Outcome: Ongoing (interventions implemented as appropriate)    Problem: Fractured Hip (Adult)  Goal: Signs and Symptoms of Listed Potential Problems Will be Absent or Manageable (Fractured Hip)  Outcome: Ongoing (interventions implemented as appropriate)    Problem: Fall Risk (Adult)  Goal: Absence of Falls  Outcome: Ongoing (interventions implemented as appropriate)    Problem: Skin Integrity Impairment, Risk/Actual (Adult)  Goal: Skin Integrity/Wound Healing  Outcome: Ongoing (interventions implemented as appropriate)    Problem: Pressure Ulcer Risk (Lowell Scale) (Adult,Obstetrics,Pediatric)  Goal: Identify Related Risk Factors and Signs and Symptoms  Outcome: Ongoing (interventions implemented as appropriate)  Goal: Skin Integrity  Outcome: Ongoing (interventions implemented as appropriate)

## 2017-04-17 NOTE — THERAPY DISCHARGE NOTE
Acute Care - Speech Language Pathology   Swallow Treatment Note/Discharge   [facility]     Patient Name: Tracee Neal  : 4/15/1932  MRN: [id number]  Today's Date: 2017  Onset of Illness/Injury or Date of Surgery Date: 17     Referring Physician: Dr. Felipe      Admit Date: 2017  Pt more alert today. Given full range of PO consistencies. Pt developed hicc-ups during trials. No overt s/s of aspiraton seen with full range of consistencies. Would not take much water. Some throat clearing with cracker. Asked for water to help clear. No other overt s/s of aspiration. Poor limited dentition.   1.  Ok for [facility] sot diet with thin liquids.   2.  Patient appears at baseline. ST services not warranted.   Gloria Bah, MS CCC-SLP 2017 8:54 AM    Visit Dx:      ICD-10-CM ICD-9-CM   1. Hip fracture, right, closed, initial encounter S72.001A 820.8   2. Impaired physical mobility Z74.09 781.99   3. Impaired mobility and ADLs Z74.09 799.89   4. Oropharyngeal dysphagia R13.12 787.22     Patient Active Problem List   Diagnosis   • Generalized weakness   • SIADH (syndrome of inappropriate ADH production)   • HTN (hypertension)   • Osteoarthritis   • Ulcerative colitis   • Pulmonary nodules   • Iron deficiency anemia   • Bacterial UTI   • Altered mental status   • Hip fracture requiring operative repair   • Closed intertrochanteric fracture of right hip             Adult Rehabilitation Note       17 0825 17 1134 17 1041    Rehab Assessment/Intervention    Discipline speech language pathologist  -KW speech language pathologist  -CS physical therapy assistant  -NW    Document Type therapy note (daily note)  -KW  therapy note (daily note)  -NW    Subjective Information pain   RN aware  -KW  complains of;pain  -NW    Symptoms Noted During/After Treatment   increased pain  -NW    Precautions/Limitations   fall precautions;hip precautions- right  -NW    Recorded by [KW] Gloria Bah, MS CCC-SLP  [CS] Magnus Luong MS, CFY-SLP [NW] Ryanne Sweet PTA    Pain Assessment    Pain Assessment   Paris-Bowden FACES  -NW    Paris-Baker FACES Pain Rating   10  -NW    Pain Type   Acute pain;Surgical pain  -NW    Pain Location   Hip  -NW    Pain Orientation   Right  -NW    Pain Frequency   Intermittent   w movement  -NW    Pain Intervention(s)   Medication (See MAR);Repositioned  -NW    Response to Interventions   didn't tolerate very well  -NW    Recorded by   [NW] Ryanne Sweet PTA    Cognitive Assessment/Intervention    Personal Safety Interventions   fall prevention program maintained  -NW    Recorded by   [NW] Ryanne Sweet PTA    Swallow Assessment/Intervention    Additional Documentation  Dysphagia/Swallow Intervention (Group)  -CS     Recorded by  [CS] Magnus Luong MS, CFY-SLP     Dysphagia/Swallow Intervention    Dysphagia/Swallow Therapeutic Feed Pt more alert today.  Given full range of PO consistencies.  Pt developed hicc-ups during trials.  No overt s/s of aspiraton seen with full range of consistencies.  Would not take much water.  Some throat clearing with cracker.  Asked for water to help clear.  No other overt s/s of aspiration.  Poor limited dentition.  Ok for [facility] sot diet with thin liquids.  Patient appears at baseline.  ST services not warranted.     -KW Pt will tolerate recommended diet as well as trials of upgraded diet consistencies w/o any overt s/s of aspiration.  -CS     Recorded by [KW] Gloria Bah MS CCC-SLP [CS] Magnus Luong MS, CFY-SLP     Improve oral skills    Status: Improve Oral Skills Achieved  -KW      Oral Skills Progress discontinue achieved  -KW      Recorded by [KW] Gloria Bah MS CCC-SLP      Improve timing of pharyngeal response    Status: Improve timing of pharyngeal response Achieved  -KW      Timing of Pharyngeal Response Progress discontinue achieved  -KW      Recorded by [KW] Gloria Bah MS CCC-SLP      Improve tongue base & pharyngeal wall squeeze     "Status: Improve tongue base & pharyngeal wall squeeze Achieved  -KW      Tongue Base/Pharyngeal Wall Squeeze Progress discontinue achieved  -KW      Recorded by [KW] Gloria Bah MS CCC-SLP      Bed Mobility, Assessment/Treatment    Bed Mob, Supine to Sit, [place]   verbal cues required;maximum assist (25% patient effort);dependent (less than 25% patient effort);2 person assist required  -NW    Bed Mob, Sit to Supine, [place]om[place]   verbal cues required;maximum assist (25% patient effort);2 person assist required;dependent (less than 25% patient effort)  -NW    Bed Mobility, Safety Issues   decreased use of arms for pushing/pulling;decreased use of legs for bridging/pushing  -NW    Bed Mobility, Impairments   ROM decreased;strength decreased;pain  -NW    Bed Mobility, Comment   Pt cont to say \"i can't\" needs lots of encouragement to move and work w/ PT  -NW    Recorded by   [NW] Ryanne Sweet PTA    Transfer Assessment/Treatment    Transfers, Bed-Chair Toombs   not appropriate to assess  -NW    Recorded by   [NW] Ryanne Sweet PTA    Gait Assessment/Treatment    Gait, Toombs Level   not appropriate to assess  -NW    Recorded by   [NW] Ryanne Sweet PTA    Motor Skills/Interventions    Additional Documentation   Balance Skills Training (Group)  -NW    Recorded by   [NW] Ryanne Sweet PTA    Balance Skills Training    Sitting-Level of Assistance   Minimum assistance   leans to L due to pain in R hip  -NW    Sitting-Balance Support   Right upper extremity supported;Feet supported  -NW    Recorded by   [NW] Ryanne Sweet PTA    Therapy Exercises    Right Lower Extremity   PROM:;10 reps;sitting  -NW    Recorded by   [NW] Ryanne Sweet PTA    Positioning and Restraints    Pre-Treatment Position   in bed  -NW    Post Treatment Position   bed  -NW    In Bed   fowlers;call light within reach;encouraged to call for assist;exit alarm on  -NW    Recorded by   [NW] Ryanne Sweet PTA      " 04/15/17 1457          Rehab Assessment/Intervention    Discipline physical therapy assistant  -      Document Type therapy note (daily note)  -      Subjective Information agree to therapy;complains of;weakness;pain  -      Precautions/Limitations fall precautions  -      Recorded by [] Shraddha Wadsworth PTA      Pain Assessment    Pain Assessment Paris-Bowden FACES  -      Paris-Bowden FACES Pain Rating 6  -      Pain Type Acute pain  -      Pain Location Hip  -      Pain Orientation Right  -      Pain Frequency Constant/continuous   with ROM  -      Pain Intervention(s) Repositioned  -      Response to Interventions tolerated  -      Recorded by [] Shraddha Wadsworth PTA      Therapy Exercises    Right Lower Extremity AAROM:;PROM:;10 reps;supine  -      Left Lower Extremity AAROM:;10 reps;supine  -      Recorded by [] Shraddha Wadsworth PTA      Positioning and Restraints    Pre-Treatment Position in bed  -      Post Treatment Position bed  -      In Bed fowlers;call light within reach;encouraged to call for assist;exit alarm on;notified nsg  -      Recorded by [] Shraddha Wadsworth PTA        User Key  (r) = Recorded By, (t) = Taken By, (c) = Cosigned By    Initials Name Effective Dates     Shraddha Wadsworth, [facility] 08/02/16 -     KW Gloria Bah MS [facility]-SLP 08/02/16 -     NW Ryanne Sweet [facility] 08/02/16 -     CS Magnus Luong MS, CFY-SLP 08/02/16 -               IP SLP Goals       04/17/17 0847 04/16/17 1338       Begin to Take Some PO Safely    Begin to Take Some PO Safely- SLP, Date Established  04/16/17  -CS     Begin to Take Some PO Safely- SLP, Time to Achieve  by discharge  -CS     Begin to Take Some PO Safely- SLP, Activity Level  Patient will improve oral skills for more efficient eating;Patient will improve timing of pharyngeal response for safer, more efficient swallow;Patient will improve tongue base/pharyngeal wall squeeze for safer, more efficient swallow  -CS     Begin to  Take Some PO Safely- SLP, Additional Goal  Pt will tolerate recommended diet as well as trials of upgraded diet consistencies w/o any overt s/s of aspiration.  -CS     Begin to Take Some PO Safely- SLP, Date Goal Reviewed 04/17/17  -KW      Begin to Take Some PO Safely- SLP, Outcome goal met  -KW goal ongoing  -CS       User Key  (r) = Recorded By, (t) = Taken By, (c) = Cosigned By    Initials Name Provider Type    RC Bah MS CCC-SLP Speech and Language Pathologist    PAVEL Luong MS, CFY-SLP Speech and Language Pathologist          EDUCATION  The patient has been educated in the following areas:   Dysphagia (Swallowing Impairment).    SLP Recommendation and Plan                       Anticipated Discharge Disposition: skilled nursing facility                   Plan of Care Review  Plan Of Care Reviewed With: patient  Progress: improving  Outcome Summary/Follow up Plan: Pt more alert today. Given full range of PO consistencies. Pt developed hicc-ups during trials. No overt s/s of aspiraton seen with full range of consistencies. Would not take much water. Some throat clearing with cracker. Asked for water to help clear. No other overt s/s of aspiration. Poor limited dentition. Ok for [facility] sot diet with thin liquids. Patient appears at baseline. ST services not warranted.        SLP Outcome Measures (last 72 hours)      SLP Outcome Measures       04/16/17 1300          SLP Outcome Measures    Outcome Measure Used? Adult NOMS  -CS      OTHER    SLP Diagnoses Dysphagia  -CS      FCM Scores    FCM Chosen Swallowing  -CS      Swallowing FCM Score 2  -CS        User Key  (r) = Recorded By, (t) = Taken By, (c) = Cosigned By    Initials Name Effective Dates     Magnus Luong MS, CFY-SLP 08/02/16 -              Time Calculation:         Time Calculation- SLP       04/17/17 0852          Time Calculation- SLP    SLP Start Time 0825  -KW      SLP Stop Time 0852  -KW      SLP Time Calculation (min) 27 min  -KW       SLP Received On 04/17/17  -RC        User Key  (r) = Recorded By, (t) = Taken By, (c) = Cosigned By    Initials Name Provider Type    RC Bah MS CCC-SLP Speech and Language Pathologist          Therapy Charges for Today     Code Description Service Date Service Provider Modifiers Qty    34041448226 HC ST SWALLOWING PROJECTED 4/17/2017 MS BOYD Ryan GN, CK 1    14725957460 HC ST SWALLOWING DISCHARGE 4/17/2017 MS BOYD Ryan, CI 1    00564691744 HC ST TREATMENT SWALLOW 2 4/17/2017 MS BOYD Ryan GN, KX 1          SLP G-Codes  SLP NOMS Used?: Yes  Functional Limitations: Swallowing  Swallow Current Status (): At least 80 percent but less than 100 percent impaired, limited or restricted  Swallow Goal Status (): At least 40 percent but less than 60 percent impaired, limited or restricted  Swallow Discharge Status (): At least 1 percent but less than 20 percent impaired, limited or restricted    SLP Discharge Summary  Anticipated Discharge Disposition: skilled nursing facility  Reason for Discharge: At baseline function  Outcomes Achieved: Able to achieve all goals within established timeline  Discharge Destination: other (comment) (still in acute)    MS BOYD Richardson  4/17/2017

## 2017-04-17 NOTE — PLAN OF CARE
Problem: Patient Care Overview (Adult)  Goal: Plan of Care Review  Outcome: Ongoing (interventions implemented as appropriate)    04/17/17 0344   Coping/Psychosocial Response Interventions   Plan Of Care Reviewed With patient   Patient Care Overview   Progress no change   Outcome Evaluation   Outcome Summary/Follow up Plan Patient severly confused, showing signs of severe pain at start of shift, MD notified, iv prn pain medication ordedered, given and tolerated, pt rested well throughout night following pain medication          Problem: Fractured Hip (Adult)  Goal: Signs and Symptoms of Listed Potential Problems Will be Absent or Manageable (Fractured Hip)  Outcome: Ongoing (interventions implemented as appropriate)    Problem: Fall Risk (Adult)  Goal: Absence of Falls  Outcome: Ongoing (interventions implemented as appropriate)    Problem: Skin Integrity Impairment, Risk/Actual (Adult)  Goal: Skin Integrity/Wound Healing  Outcome: Ongoing (interventions implemented as appropriate)

## 2017-04-17 NOTE — PLAN OF CARE
Problem: Patient Care Overview (Adult)  Goal: Plan of Care Review  Outcome: Ongoing (interventions implemented as appropriate)    04/17/17 1507   Coping/Psychosocial Response Interventions   Plan Of Care Reviewed With patient   Patient Care Overview   Progress progress toward functional goals is gradual   Outcome Evaluation   Outcome Summary/Follow up Plan Progress is gradual at this tx!

## 2017-04-17 NOTE — PROGRESS NOTES
Continued Stay Note  TEA Barrios     Patient Name: Tracee Neal  MRN: [id number]  Today's Date: 4/17/2017    Admit Date: 4/13/2017          Discharge Plan       04/17/17 1107    Case Management/Social Work Plan    Plan [place]    Additional Comments SW is following to arrange discharge back to [place].               Discharge Codes     None            ANGÉLICA Gao

## 2017-04-17 NOTE — PROGRESS NOTES
"    [facility] Medicine Services  INPATIENT PROGRESS NOTE    Length of Stay: 4  Date of Admission: 4/13/2017  Primary Care Physician: Pascual Patten MD    Subjective   Chief Complaint: \"Doing pretty good\"  Fall   Associated symptoms include vomiting.   Vomiting       Patient is doing a lot better today.  She is awake and alert.  She is been able to void.  She is tolerating her diet without problem.  She was able to work with physical therapy earlier.  She continues to have some pain in her right lower extremity but this is stable.  All in all, significant improvement compared to yesterday.    Review of Systems   Gastrointestinal: Positive for vomiting.        All pertinent negatives and positives are as above. All other systems have been reviewed and are negative unless otherwise stated.     Objective    Temp:  [97.6 °F (36.4 °C)-98.6 °F (37 °C)] 97.6 °F (36.4 °C)  Heart Rate:  [] 97  Resp:  [18] 18  BP: (129-140)/(52-93) 140/93  Physical Exam   Constitutional: She appears well-developed. No distress.   Much more awake and alert today   HENT:   Head: Normocephalic.   Mouth/Throat: No oropharyngeal exudate.   Eyes: Pupils are equal, round, and reactive to light. No scleral icterus.   Cardiovascular: Normal rate and regular rhythm.    Pulmonary/Chest: Effort normal.   Abdominal: Soft. She exhibits no distension.   Neurological: She is alert. No cranial nerve deficit.   Skin: Skin is warm and dry.   Vitals reviewed.    Results Review:  I have reviewed the labs, radiology results, and diagnostic studies.    Laboratory Data:     Results from last 7 days  Lab Units 04/17/17  0607 04/16/17  0459 04/15/17  0536 04/13/17  1934   WBC 10*3/mm3 8.34 11.40*  --  10.40   HEMOGLOBIN g/dL 8.7* 9.2* 10.5* 12.2   HEMATOCRIT % 25.4* 26.5* 30.9* 35.4*   PLATELETS 10*3/mm3 162 148  --  266          Results from last 7 days  Lab Units 04/17/17  0607 04/16/17  0459 04/15/17  0536  04/13/17  1934 "   SODIUM mmol/L 130* 130* 131*  < > 131*   POTASSIUM mmol/L 3.9 4.1 4.3  < > 3.6   CHLORIDE mmol/L 98 98 97*  < > 94*   TOTAL CO2 mmol/L 26.0 24.0 24.0  < > 24.0   BUN mg/dL 15 16 13  < > 15   CREATININE mg/dL 0.64 0.64 0.67  < > 0.85   CALCIUM mg/dL 8.3* 8.5 8.6  < > 9.4   BILIRUBIN mg/dL  --   --   --   --  0.6   ALK PHOS U/L  --   --   --   --  93   ALT (SGPT) U/L  --   --   --   --  20   AST (SGOT) U/L  --   --   --   --  23   GLUCOSE mg/dL 69* 89 105*  < > 100   < > = values in this interval not displayed.    Culture Data:   Urine Culture   Date Value Ref Range Status   04/13/2017 >100,000 CFU/mL Escherichia coli (A)  Final       Radiology Data:   Imaging Results (last 24 hours)     ** No results found for the last 24 hours. **          I have reviewed the patient current medications.     Assessment/Plan     Hospital Problem List     * (Principal)Closed intertrochanteric fracture of right hip        Assessment:  1.  Right intertrochanteric hip fracture POD #3  2.  UTI - Urine culture with E. Coli  3.  Hyponatremia - history of SIADH  4.  Anemia with iron deficiency  5.  Hypertension  6.  Recent history of left hip fracture repair  7.  History of Wegener's granulomatosis  8.  Urinary Retention - improved  9.  Dysphagia - improved    Plan:  1.  IV Azactam day 4 of 5 (allergies to PCN, cephalosporins, and fluoroquinolones)  2.  Low dose PO Norco PRN pain; will also use Tylenol PRN for pain; monitor closely for s/sxs sedation/somnolence     3.  Coumadin for DVT PPx per Ortho; trend PT/INR  4.  PT/OT  5.  Now voiding and urinary retention symptoms improved  6.  Bowel regimen  7.  Nystatin to skin folds  8.  D/C IVFs  9.  Wean 02 to off if possible; patient was not on home 02 preceding this hospitalization  10.  Hopeful for discharge to [facility] tomorrow pending the above.    Isaac Felipe MD   04/17/17   2:37 PM

## 2017-04-17 NOTE — PLAN OF CARE
Problem: Patient Care Overview (Adult)  Goal: Plan of Care Review  Outcome: Ongoing (interventions implemented as appropriate)    04/17/17 1150   Coping/Psychosocial Response Interventions   Plan Of Care Reviewed With patient   Patient Care Overview   Progress progress toward functional goals is gradual   Outcome Evaluation   Outcome Summary/Follow up Plan Pt seems to be doing better today, more alert. Needed encouragement for OOb but once sitting reported feeling better. Pt able to sit EOb handrail and drawsheet max2 worked on sitting balance due to pt wanted to push to the Right due to pain in L hip. Able to sit cga and tolerate BLE ex's RLE A-AAROM x10 and LLE AROM x10. Pt will need snf upon d/c

## 2017-04-18 VITALS
TEMPERATURE: 98 F | DIASTOLIC BLOOD PRESSURE: 74 MMHG | HEART RATE: 79 BPM | OXYGEN SATURATION: 100 % | SYSTOLIC BLOOD PRESSURE: 133 MMHG | RESPIRATION RATE: 18 BRPM | BODY MASS INDEX: 29.48 KG/M2 | HEIGHT: 59 IN | WEIGHT: 146.25 LBS

## 2017-04-18 LAB
DEPRECATED RDW RBC AUTO: 46.3 FL (ref 40–54)
ERYTHROCYTE [DISTWIDTH] IN BLOOD BY AUTOMATED COUNT: 13.5 % (ref 12–15)
HCT VFR BLD AUTO: 25.1 % (ref 37–47)
HGB BLD-MCNC: 8.6 G/DL (ref 12–16)
INR PPP: 1.28 (ref 0.91–1.09)
MCH RBC QN AUTO: 32.3 PG (ref 28–32)
MCHC RBC AUTO-ENTMCNC: 34.3 G/DL (ref 33–36)
MCV RBC AUTO: 94.4 FL (ref 82–98)
PLATELET # BLD AUTO: 194 10*3/MM3 (ref 130–400)
PMV BLD AUTO: 10.4 FL (ref 6–12)
PROTHROMBIN TIME: 16.4 SECONDS (ref 11.9–14.6)
RBC # BLD AUTO: 2.66 10*6/MM3 (ref 4.2–5.4)
WBC NRBC COR # BLD: 7.8 10*3/MM3 (ref 4.8–10.8)

## 2017-04-18 PROCEDURE — 97110 THERAPEUTIC EXERCISES: CPT

## 2017-04-18 PROCEDURE — 97535 SELF CARE MNGMENT TRAINING: CPT | Performed by: OCCUPATIONAL THERAPIST

## 2017-04-18 PROCEDURE — 85610 PROTHROMBIN TIME: CPT | Performed by: ORTHOPAEDIC SURGERY

## 2017-04-18 PROCEDURE — 85027 COMPLETE CBC AUTOMATED: CPT | Performed by: INTERNAL MEDICINE

## 2017-04-18 RX ORDER — HYDROCODONE BITARTRATE AND ACETAMINOPHEN 5; 325 MG/1; MG/1
0.5 TABLET ORAL EVERY 6 HOURS PRN
Qty: 10 TABLET | Refills: 0 | Status: SHIPPED | OUTPATIENT
Start: 2017-04-18 | End: 2017-04-27

## 2017-04-18 RX ORDER — FUROSEMIDE 20 MG/1
20 TABLET ORAL DAILY PRN
Start: 2017-04-18

## 2017-04-18 RX ORDER — WARFARIN SODIUM 3 MG/1
TABLET ORAL
Qty: 30 TABLET | Refills: 0 | Status: SHIPPED | OUTPATIENT
Start: 2017-04-18

## 2017-04-18 RX ORDER — METOPROLOL SUCCINATE 25 MG/1
25 TABLET, EXTENDED RELEASE ORAL
Start: 2017-04-18

## 2017-04-18 RX ORDER — ACETAMINOPHEN 325 MG/1
650 TABLET ORAL EVERY 4 HOURS PRN
Refills: 0
Start: 2017-04-18

## 2017-04-18 RX ADMIN — NYSTATIN: 100000 POWDER TOPICAL at 10:45

## 2017-04-18 RX ADMIN — MESALAMINE 800 MG: 800 TABLET, DELAYED RELEASE ORAL at 10:45

## 2017-04-18 RX ADMIN — AZTREONAM 1 G: 1 INJECTION, POWDER, FOR SOLUTION INTRAMUSCULAR; INTRAVENOUS at 05:29

## 2017-04-18 RX ADMIN — AZTREONAM 1 G: 1 INJECTION, POWDER, FOR SOLUTION INTRAMUSCULAR; INTRAVENOUS at 13:46

## 2017-04-18 RX ADMIN — ASPIRIN 81 MG: 81 TABLET ORAL at 10:44

## 2017-04-18 RX ADMIN — ACETAMINOPHEN 650 MG: 325 TABLET ORAL at 10:50

## 2017-04-18 RX ADMIN — METOPROLOL SUCCINATE 25 MG: 25 TABLET, FILM COATED, EXTENDED RELEASE ORAL at 10:44

## 2017-04-18 RX ADMIN — DOCUSATE SODIUM 100 MG: 100 CAPSULE ORAL at 10:45

## 2017-04-18 NOTE — PLAN OF CARE
"Problem: Patient Care Overview (Adult)  Goal: Plan of Care Review  Outcome: Ongoing (interventions implemented as appropriate)    04/18/17 1110   Coping/Psychosocial Response Interventions   Plan Of Care Reviewed With patient   Patient Care Overview   Progress progress towards functional goals is fair   Outcome Evaluation   Outcome Summary/Follow up Plan Pt in bed upon entering the room. Pt states \"I feel miserable today.\", and reports 6/10 pain in her R hip. Pt performed AAROM/AROM R LE ex's and AROM L LE in bed. Pt pain increased to 8/10 after B LE ex's, yet she states thst the ex's made her \"feel better and stronger\". Pt would benefit from continued PT for LE strengthening, balance training, and safety for gait.            "

## 2017-04-18 NOTE — PLAN OF CARE
Problem: Patient Care Overview (Adult)  Goal: Plan of Care Review  Outcome: Ongoing (interventions implemented as appropriate)    04/18/17 0989   Coping/Psychosocial Response Interventions   Plan Of Care Reviewed With patient   Patient Care Overview   Progress improving   Outcome Evaluation   Outcome Summary/Follow up Plan Pt. performedbed mobility at Max A x 2. Required cues for body mechs, to reduce fear, & to promote participation. Pt. stood at EOB with Mod A x 2. Attempted pivot transfer to [facility] but pt. L knee buckled and she was assisted to the bed. Cont OT POC

## 2017-04-18 NOTE — DISCHARGE SUMMARY
"    [facility] Medicine Services  DISCHARGE SUMMARY       Date of Admission: 4/13/2017  Date of Discharge:  4/18/2017  Primary Care Physician: Pascual Patten MD    Presenting Problem/History of Present Illness:  Hip fracture, right, closed, initial encounter [S72.001A]     Final Discharge Diagnoses:  Hospital Problem List     * (Principal)Closed intertrochanteric fracture of right hip          Consults: ortho    Procedures Performed: chest xray on 4/13, xray of hip 4/13, head CT    Pertinent Test Results:   Xray hip 4/13:There is an intertrochanteric fracture of the right femoral  neck with varus angulation. A component of the fracture extends into the  proximal shaft of the femur medially. The lesser trochanter appears to  be a separate fragment. There has been prior left hip fracture repair  with with hardware. The bones are demineralized. There are degenerative  changes of the lumbar spine.  chest xray normal, CT head No hemorrhage, edema or mass effect.  2. Moderate atrophy with associated ventricular prominence.  3. Extensive low density in the white matter is nonspecific and most  likely due to chronic small vessel disease.        Chief Complaint on Day of Discharge: \"doesn't feel good\"    History of Present Illness on Day of Discharge: Patient states today she just does not feel very well.  She is awake and eating.  He does state that she has some pain mostly in her hip.  Per nursing staff there have been no acute issues.  She is incontinent of her urine mostly.  She is able to work with physical therapy although is requiring significant assistance.    Hospital Course:  The patient is a 85 y.o. female who presented to [facility] with right hip pain after a fall.  She has been ambulating with a cane however had an accidental mechanical fall.  A right hip intertrochanteric fracture was identified and patient was taken to the OR by Dr. Locek with orthopedic " "surgery.  Physical therapy was consulted.  Patient was started on Coumadin for DVT prophylaxis after a hip procedure.  She is still mildly subtherapeutic and therefore time of discharge I will increase her Coumadin from 2-3.  Her INRs should be followed and this should be managed outpatient.  Patient was having increased somnolence which was felt to be secondary to pain medication.  Tylenol was being given for pain control and a half dose 5 mg Norco if needed.  She was seen by speech and a mechanical soft diet with thin liquids was felt appropriate.  It was found to have a UTI due to her multiple allergies she was being treated with IV aztreonam.  Patient was felt to be improving and had no fevers or white count while here.  She received 5 days of IV antibiotics therefore I did not continue antibiotics at time of discharge..      Condition on Discharge:  stable    Physical Exam on Discharge:  /74 (BP Location: Left arm, Patient Position: Lying)  Pulse 79  Temp 98 °F (36.7 °C) (Oral)   Resp 18  Ht 59\" (149.9 cm)  Wt 146 lb 4 oz (66.3 kg)  SpO2 100%  BMI 29.54 kg/m2  Physical Exam   Constitutional: She appears well-developed and well-nourished.   Seems a bit sleepy but easily arousable and answers most of my questions   HENT:   Head: Normocephalic and atraumatic.   Eyes: Conjunctivae and EOM are normal. Pupils are equal, round, and reactive to light.   Neck: Neck supple. No JVD present. No thyromegaly present.   Cardiovascular: Normal rate, regular rhythm, normal heart sounds and intact distal pulses.  Exam reveals no gallop and no friction rub.    No murmur heard.  Pulmonary/Chest: Effort normal and breath sounds normal. No respiratory distress. She has no wheezes. She has no rales. She exhibits no tenderness.   Abdominal: Soft. Bowel sounds are normal. She exhibits no distension. There is no tenderness. There is no rebound and no guarding.   Musculoskeletal: Normal range of motion. She exhibits edema " (trace edema). She exhibits no tenderness or deformity.   Lymphadenopathy:     She has no cervical adenopathy.   Neurological: She is alert. She displays normal reflexes. No cranial nerve deficit. She exhibits normal muscle tone.   Skin: Skin is warm and dry. No rash noted.         Discharge Disposition:  Skilled Nursing Facility ([place] - External)    Discharge Medications:   Tracee Neal   Home Medication Instructions SERGIO:[id number]    Printed on:04/18/17 7403   Medication Information                      acetaminophen (TYLENOL) 325 MG tablet  Take 2 tablets by mouth Every 4 (Four) Hours As Needed for Mild Pain (1-3) or Moderate Pain (4-6).             aspirin 81 MG EC tablet  Take 81 mg by mouth every evening.             docusate sodium 100 MG capsule  Take 100 mg by mouth 2 (Two) Times a Day As Needed for constipation.             furosemide (LASIX) 20 MG tablet  Take 1 tablet by mouth Daily As Needed (lower ext edema, weight gain).             HYDROcodone-acetaminophen (NORCO) 5-325 MG per tablet  Take 0.5 tablets by mouth Every 6 (Six) Hours As Needed for Moderate Pain (4-6) for up to 9 days.             mesalamine (ASACOL) 400 MG EC tablet  Take 800 mg by mouth 2 (two) times a day.             metoprolol succinate XL (TOPROL-XL) 25 MG 24 hr tablet  Take 1 tablet by mouth Daily.             nystatin (NYAMYC) 899533 UNIT/GM powder topical powder  Apply  topically Every 8 (Eight) Hours.             ondansetron (ZOFRAN) 4 MG tablet  Take 4 mg by mouth every 8 (eight) hours as needed for nausea or vomiting.             pantoprazole (PROTONIX) 40 MG EC tablet  Take 40 mg by mouth daily.             saccharomyces boulardii (FLORASTOR) 250 MG capsule  Take 1 capsule by mouth 2 (Two) Times a Day.             warfarin (COUMADIN) 3 MG tablet  Take one tab by mouth daily                 Discharge Diet:   Diet Instructions     Diet: Regular, Soft Texture; Thin Liquids, No Restrictions; Whole       Discharge Diet:    Regular  Soft Texture      Fluid Consistency:  Thin Liquids, No Restrictions   Soft Options:  Whole                 Activity at Discharge:   Activity Instructions     Activity as Tolerated       Needs assistance, continue working with PT after hip fracture, weakness, balance, gait issues                 Discharge Care Plan/Instructions: f/u INR checks. Continue PT    Follow-up Appointments:   Dr. Locke 4 weeks    Test Results Pending at Discharge: none    Purvi Nguyen MD  04/18/17  12:27 PM    Time: 40 min

## 2017-04-18 NOTE — PLAN OF CARE
Problem: Patient Care Overview (Adult)  Goal: Plan of Care Review  Outcome: Ongoing (interventions implemented as appropriate)    04/18/17 0232   Coping/Psychosocial Response Interventions   Plan Of Care Reviewed With patient   Patient Care Overview   Progress no change   Outcome Evaluation   Outcome Summary/Follow up Plan Patient rested better tonight, restless at times, no pain medication indicated. Confusion continues with non compliance at times          04/18/17 0232   Coping/Psychosocial Response Interventions   Plan Of Care Reviewed With patient   Patient Care Overview   Progress no change   Outcome Evaluation   Outcome Summary/Follow up Plan Patient rested better tonight, restless at times, no pain medication indicated. Confusion continues with non compliance at times          Problem: Fractured Hip (Adult)  Goal: Signs and Symptoms of Listed Potential Problems Will be Absent or Manageable (Fractured Hip)  Outcome: Ongoing (interventions implemented as appropriate)    Problem: Fall Risk (Adult)  Goal: Absence of Falls  Outcome: Ongoing (interventions implemented as appropriate)    Problem: Pressure Ulcer Risk (Lowell Scale) (Adult,Obstetrics,Pediatric)  Goal: Identify Related Risk Factors and Signs and Symptoms  Outcome: Ongoing (interventions implemented as appropriate)  Goal: Skin Integrity  Outcome: Ongoing (interventions implemented as appropriate)

## 2017-04-18 NOTE — PLAN OF CARE
Problem: Inpatient Occupational Therapy  Goal: Transfer Training Goal 1 LTG- OT  Outcome: Unable to achieve outcome(s) by discharge Date Met:  04/18/17    04/15/17 0848 04/18/17 1600   Transfer Training OT LTG   Transfer Training OT LTG, Date Established 04/15/17 --    Transfer Training OT LTG, Time to Achieve by discharge --    Transfer Training OT LTG, Activity Type bed to chair /chair to bed;toilet --    Transfer Training OT LTG, [place] Level moderate assist (50% patient effort) --    Transfer Training OT LTG, Assist Device commode, bedside;walker, rolling --    Transfer Training OT LTG, Date Goal Reviewed --  04/18/17   Transfer Training OT LTG, Outcome --  goal not met   Transfer Training OT LTG, Reason Goal Not Met --  discharged from facility       Goal: Bathing Goal LTG- OT  Outcome: Unable to achieve outcome(s) by discharge Date Met:  04/18/17    04/15/17 0848 04/18/17 1600   Bathing OT LTG   Bathing Goal OT LTG, Date Established 04/15/17 --    Bathing Goal OT LTG, Time to Achieve by discharge --    Bathing Goal OT LTG, Activity Type lower body bathing --    Bathing Goal OT LTG, [place] Level minimum assist (75% patient effort) --    Bathing Goal OT LTG, Assist Device shower chair with back;sponge, long handled;grab bars --    Bathing Goal OT LTG, Date Goal Reviewed --  04/18/17   Bathing Goal OT LTG, Outcome --  goal not met   Bathing Goal OT LTG, Reason Goal Not Met --  discharged from facility       Goal: Toileting Goal LTG- OT  Outcome: Unable to achieve outcome(s) by discharge Date Met:  04/18/17    04/15/17 0848 04/18/17 1600   Toileting OT LTG   Toileting Goal OT LTG, Date Established 04/15/17 --    Toileting Goal OT LTG, Time to Achieve by discharge --    Toileting Goal OT LTG, [place] Level moderate assist (50% patient effort) --    Toileting Goal OT LTG, Date Goal Reviewed --  04/18/17   Toileting Goal OT LTG, Outcome --  goal not met   Toileting Goal OT LTG, Reason Goal Not Met --   discharged from facility       Goal: LB Dressing Goal LTG- OT  Outcome: Unable to achieve outcome(s) by discharge Date Met:  04/18/17    04/15/17 0848 04/18/17 1600   LB Dressing OT LTG   LB Dressing Goal OT LTG, Date Established 04/15/17 --    LB Dressing Goal OT LTG, Time to Achieve by discharge --    LB Dressing Goal OT LTG, [place] Level moderate assist (50% patient effort) --    LB Dressing Goal OT LTG, Adaptive Equipment dressing stick;laces, elastic;reacher;shoe horn, long handled;sock-aid --    LB Dressing Goal OT LTG, Date Goal Reviewed --  04/18/17   LB Dressing Goal OT LTG, Outcome --  goal not met   LB Dressing Goal OT LTG, Reason Goal Not Met --  discharged from facility

## 2017-04-18 NOTE — THERAPY DISCHARGE NOTE
Acute Care - Occupational Therapy Initial Eval/Discharge  [facility]     Patient Name: Tracee Neal  : 4/15/1932  MRN: [id number]  Today's Date: 2017  Onset of Illness/Injury or Date of Surgery Date: 17  Date of Referral to OT: 04/15/17  Referring Physician: Dr. Locke      Admit Date: 2017       ICD-10-CM ICD-9-CM   1. Hip fracture, right, closed, initial encounter S72.001A 820.8   2. Impaired physical mobility Z74.09 781.99   3. Impaired mobility and ADLs Z74.09 799.89   4. Oropharyngeal dysphagia R13.12 787.22   5. Generalized weakness R53.1 780.79     Patient Active Problem List   Diagnosis   • Generalized weakness   • SIADH (syndrome of inappropriate ADH production)   • HTN (hypertension)   • Osteoarthritis   • Ulcerative colitis   • Pulmonary nodules   • Iron deficiency anemia   • Bacterial UTI   • Altered mental status   • Hip fracture requiring operative repair   • Closed intertrochanteric fracture of right hip     Past Medical History:   Diagnosis Date   • Anxiety    • Arthritis    • Clostridium difficile infection    • Concussion    • Fall    • History of transfusion    • Hypertension    • Inappropriate vasopressin secretion syndrome    • Irregular heart rhythm    • TIA (transient ischemic attack)    • Ulcerative colitis      Past Surgical History:   Procedure Laterality Date   • CHOLECYSTECTOMY     • EYE SURGERY     • HIP TROCANTERIC NAILING WITH INTRAMEDULLARY HIP SCREW Left 2/15/2017    Procedure: HIP TROCANTERIC NAILING SHORT WITH INTRAMEDULLARY HIP SCREW;  Surgeon: Pastor Lee MD;  Location: [facility] OR;  Service:    • HIP TROCANTERIC NAILING WITH INTRAMEDULLARY HIP SCREW Right 2017    Procedure: HIP TROCANTERIC NAILING SHORT WITH INTRAMEDULLARY HIP SCREW;  Surgeon: Gael Locke MD;  Location: [facility] OR;  Service:    • JOINT REPLACEMENT      BILATERAL KNEES AND LEFT HIP   • REPLACEMENT TOTAL KNEE BILATERAL            OT ASSESSMENT FLOWSHEET (last 72 hours)      OT  Evaluation       04/18/17 1603 04/18/17 1342 04/18/17 1110 04/18/17 0944 04/17/17 1603    Rehab Evaluation    Document Type  therapy note (daily note)  -MS (r) ET (t) MS (c) therapy note (daily note)  -MS (r) ET (t) MS (c) therapy note (daily note)  -CH therapy note (daily note)  -NW    Subjective Information  agree to therapy;complains of;pain  -MS (r) ET (t) MS (c) agree to therapy;complains of;pain  -MS (r) ET (t) MS (c) agree to therapy;complains of;weakness;fatigue;pain  -CH agree to therapy;complains of;weakness;pain  -NW    General Information    Precautions/Limitations  fall precautions;hip precautions- right  -MS (r) ET (t) MS (c) fall precautions;hip precautions- right  -MS (r) ET (t) MS (c) fall precautions;hip precautions- right  -CH fall precautions;hip precautions- right  -NW    Clinical Impression    Anticipated Discharge Disposition skilled nursing facility  -ND        Pain Assessment    Pain Assessment  0-10  -MS (r) ET (t) MS (c) 0-10  -MS (r) ET (t) MS (c) [place]-Baker FACES  -CH 0-10  -NW    Paris-[place] FACES Pain Rating   6  -MS (r) ET (t) MS (c) 8  -CH     Pain Score  7  -MS (r) ET (t) MS (c) 8  -MS (r) ET (t) MS (c)  8  -NW    Pain Type  Acute pain;Surgical pain  -MS (r) ET (t) MS (c) Acute pain;Surgical pain  -MS (r) ET (t) MS (c) Acute pain;Surgical pain  -CH Acute pain;Surgical pain  -NW    Pain Location  Generalized  -MS (r) ET (t) MS (c) Hip  -MS (r) ET (t) MS (c) Hip  -CH Hip  -NW    Pain Orientation   Right  -MS (r) ET (t) MS (c) Right  -CH Right  -NW    Pain Frequency  Intermittent  -MS (r) ET (t) MS (c) Intermittent  -MS (r) ET (t) MS (c) Intermittent  -CH Intermittent  -NW    Pain Intervention(s)  Medication (See MAR)   B LE ex's in bed   -MS (r) ET (t) MS (c) Medication (See MAR)   Pt reports feeling better after B LE ex in bed  -MS (r) ET (t) MS (c) Repositioned  -CH Repositioned  -NW    Cognitive Assessment/Intervention    Personal Safety Interventions    fall prevention program  maintained;elopement precautions initiated;gait belt;muscle strengthening facilitated;nonskid shoes/slippers when out of bed;supervised activity;toileting scheduled  - fall prevention program maintained  -NW    Bed Mobility, Assessment/Treatment    Bed Mobility, Assistive Device    bed rails;draw sheet  -CH bed rails;draw sheet  -NW    Bed Mob, Supine to Sit, [place]    verbal cues required;maximum assist (25% patient effort);2 person assist required  -CH verbal cues required;maximum assist (25% patient effort);2 person assist required  -NW    Bed Mob, Sit to Supine, [place]    verbal cues required;maximum assist (25% patient effort)  -CH verbal cues required;maximum assist (25% patient effort)  -NW    Bed Mobility, Safety Issues    decreased use of arms for pushing/pulling;decreased use of legs for bridging/pushing  -CH decreased use of arms for pushing/pulling;decreased use of legs for bridging/pushing  -NW    Bed Mobility, Impairments    strength decreased;pain  -CH strength decreased;pain  -NW    Bed Mobility, Comment  Pt only performed B LE ex's in bed  -MS (r) ET (t) MS (c) Pt only performed B LE ex in bed   -MS (r) ET (t) MS (c)      Transfer Assessment/Treatment    Transfers, Bed-Chair-Bed, Assist Device    elevated surface  -CH     Transfers, Sit-Stand [place]    verbal cues required;moderate assist (50% patient effort);2 person assist required  -CH verbal cues required;moderate assist (50% patient effort);2 person assist required   use back of chair for support  -NW    Transfers, Stand-Sit [place]    verbal cues required;minimum assist (75% patient effort)  -CH verbal cues required;minimum assist (75% patient effort)  -NW    Transfer, Safety Issues    weight-shifting ability decreased;knees buckling  -CH     Transfer, Impairments    strength decreased;impaired balance;pain  -CH     Transfer, Comment  Pt only performed B LE ex's in bed  -MS (r) ET (t) MS (c) Pt only performed B LE ex  in bed   -MS (r) ET (t) MS (c) initiated transfer to [facility] but pt. knee buckled and ceased transfer and assisted pt. to EOB and then to supine  -     Toileting Assessment/Training    Toileting Assess/Train, Position    supine  -     Toileting Assess/Train, Indepen Level    maximum assist (25% patient effort);2 person assist required;nonverbal cues required (demo/gesture);verbal cues required  -     Toileting Assess/Train, Impairments    strength decreased;pain  -     Toileting Assess/Train, Comment    intitated transfer to [facility] to trial toileting while seated; pt. LLE buckled & assisted pt. back to EOB and then to supine to change pt. brief  -     Balance Skills Training    Sitting-Level of Assistance    Contact guard;Minimum assistance  - Minimum assistance  -NW    Sitting-Balance Support    Right upper extremity supported;Left upper extremity supported  - Right upper extremity supported;Feet supported  -NW    Sitting-Balance Activities    --   seated self feeding to increase sitting aria & balance  -     Sitting # of Minutes    10  -CH     Therapy Exercises    Right Lower Extremity  AROM:;AAROM:;ankle pumps/circles;glut sets;supine;heel slides;hip abduction/adduction;quad sets;SAQ;15 reps   Only able to perform 10 reps for hip abd/add  -MS (r) ET (t) MS (c) AROM:;AAROM:;ankle pumps/circles;glut sets;supine;heel slides;hip abduction/adduction;quad sets;SAQ;10 reps  -MS (r) ET (t) MS (c)  AROM:;AAROM:;10 reps;sitting  -NW    Left Lower Extremity  AAROM:;supine;ankle pumps/circles;glut sets;heel slides;hip abduction/adduction;SAQ;quad sets;15 reps   Only able to perform 10 reps for hip abd/add  -MS (r) ET (t) MS (c) AAROM:;10 reps;supine;ankle pumps/circles;glut sets;heel slides;hip abduction/adduction;SAQ;quad sets  -MS (r) ET (t) MS (c)  AROM:;10 reps;sitting  -NW    Positioning and Restraints    Pre-Treatment Position  in bed  -MS (r) ET (t) MS (c) in bed  -MS (r) ET (t) MS (c) in bed  -CH in bed  -NW     Post Treatment Position  bed  -MS (r) ET (t) MS (c) bed  -MS (r) ET (t) MS (c) bed  -CH bed  -NW    In Bed  fowlers;call light within reach;encouraged to call for assist;exit alarm on;side rails up x2  -MS (r) ET (t) MS (c) fowlers;call light within reach;encouraged to call for assist;exit alarm on;side rails up x2  -MS (r) ET (t) MS (c) fowlers;call light within reach;encouraged to call for assist;exit alarm on;notified nsg;side rails up x2;pillow between legs  -       04/17/17 1420 04/17/17 1119 04/17/17 0825 04/16/17 1134 04/16/17 1041    Rehab Evaluation    Document Type therapy note (daily note)  -CJ therapy note (daily note)  -NW therapy note (daily note)  -KW evaluation  -CS therapy note (daily note)  -NW    Subjective Information agree to therapy;complains of;weakness;fatigue;pain  -CJ complains of;pain  -NW pain   RN aware  -KW decreased LOC  -CS complains of;pain  -NW    Patient Effort, Rehab Treatment adequate  -CJ        Symptoms Noted During/After Treatment  increased pain  -NW   increased pain  -NW    General Information    Precautions/Limitations fall precautions  -CJ fall precautions;hip precautions- right  -NW   fall precautions;hip precautions- right  -NW    Pain Assessment    Pain Assessment 0-10  -CJ 0-10  -NW  Unable to assess  -CS Paris-Bowden FACES  -NW    Paris-Baker FACES Pain Rating 4  -CJ 8  -NW   10  -NW    Pain Score 4  -CJ        Pain Type Acute pain  -CJ Acute pain;Surgical pain  -NW   Acute pain;Surgical pain  -NW    Pain Location Hip  -CJ Hip  -NW   Hip  -NW    Pain Orientation Right  -CJ Right  -NW   Right  -NW    Pain Frequency Intermittent  -CJ Intermittent  -NW   Intermittent   w movement  -NW    Pain Intervention(s) Repositioned;Rest  -CJ Repositioned  -NW   Medication (See MAR);Repositioned  -NW    Response to Interventions     didn't tolerate very well  -NW    Cognitive Assessment/Intervention    Personal Safety Interventions  fall prevention program maintained  -NW   fall  "prevention program maintained  -NW    Bed Mobility, Assessment/Treatment    Bed Mobility, Assistive Device  bed rails;draw sheet  -NW       Bed Mob, Supine to Sit, [place]  verbal cues required;maximum assist (25% patient effort);2 person assist required  -NW   verbal cues required;maximum assist (25% patient effort);dependent (less than 25% patient effort);2 person assist required  -NW    Bed Mob, Sit to Supine, [place]  verbal cues required;maximum assist (25% patient effort)  -NW   verbal cues required;maximum assist (25% patient effort);2 person assist required;dependent (less than 25% patient effort)  -NW    Bed Mobility, Safety Issues  decreased use of arms for pushing/pulling;decreased use of legs for bridging/pushing  -NW   decreased use of arms for pushing/pulling;decreased use of legs for bridging/pushing  -NW    Bed Mobility, Impairments  strength decreased;pain  -NW   ROM decreased;strength decreased;pain  -NW    Bed Mobility, Comment fowlers in bed!  -CJ    Pt cont to say \"i can't\" needs lots of encouragement to move and work w/ PT  -NW    Transfer Assessment/Treatment    Transfers, Bed-Chair [place]  not appropriate to assess  -NW   not appropriate to assess  -NW    Motor Skills/Interventions    Additional Documentation  Balance Skills Training (Group)  -NW   Balance Skills Training (Group)  -NW    Balance Skills Training    Sitting-Level of Assistance  Minimum assistance  -NW   Minimum assistance   leans to L due to pain in R hip  -NW    Sitting-Balance Support  Right upper extremity supported;Feet supported  -NW   Right upper extremity supported;Feet supported  -NW    Sitting # of Minutes  20  -NW       Therapy Exercises    Right Lower Extremity  AROM:;AAROM:;10 reps;sitting  -NW   PROM:;10 reps;sitting  -NW    Left Lower Extremity  AROM:;10 reps;sitting  -NW       Bilateral Upper Extremity AROM:;15 reps;sitting;elbow flexion/extension;shoulder extension/flexion   2lb. bar, 1lb. dumb " bells, red theraband! No shd. flex exs!  -CJ        BUE Resistance manual resistance- minimal;theraband  -CJ        Positioning and Restraints    Pre-Treatment Position in bed  -CJ in bed  -NW   in bed  -NW    Post Treatment Position bed  -CJ bed  -NW   bed  -NW    In Bed fowlers;call light within reach;encouraged to call for assist;exit alarm on;side rails up x2  -CJ fowlers;call light within reach;encouraged to call for assist;notified nsg;exit alarm on  -NW   fowlers;call light within reach;encouraged to call for assist;exit alarm on  -NW      User Key  (r) = Recorded By, (t) = Taken By, (c) = Cosigned By    Initials Name Effective Dates     Helene Montana, OTR/L 08/02/16 -     CJ Loc Rasmussen, URIAS/L 08/02/16 -     MS Cecilia Lyons, PT DPT 08/02/16 -     KW Gloria Bah, MS CCC-SLP 08/02/16 -     NW Ryanne Sweet, PTA 08/02/16 -     CS Magnus Luong, MS, CFY-SLP 08/02/16 -     ND Guillermina Maradiaga, OTR/L 10/21/16 -     ET Breann Felipe, PTA Student 04/03/17 -           Occupational Therapy Education     Title: PT OT SLP Therapies (Done)     Topic: Occupational Therapy (Resolved)     Point: ADL training (Resolved)    Description: Instruct learner(s) on proper safety adaptation and remediation techniques during self care or transfers.   Instruct in proper use of assistive devices.    Learning Progress Summary    Learner Readiness Method Response Comment Documented by Status   Patient Acceptance E VU,NR precautions, safety during ADLs, benefits of activvity & ADL participation  04/18/17 1029 Done    Acceptance E VU benefits of activity AC 04/15/17 0847 Done               Point: Home exercise program (Resolved)    Description: Instruct learner(s) on appropriate technique for monitoring, assisting and/or progressing therapeutic exercises/activities.    Learning Progress Summary    Learner Readiness Method Response Comment Documented by Status   Patient Acceptance E,D VU,DU,NR Pt. performed ue exs with  vc's and coaxing from this vela/l, rests and Pt. heitant on working!  04/17/17 1506 Done                      User Key     Initials Effective Dates Name Provider Type Discipline     06/22/15 -  Naresh Lombardi, OTR/L Occupational Therapist OT     08/02/16 -  Helene Montana, OTR/L Occupational Therapist OT     08/02/16 -  Loc Rasmussen, VELA/L Occupational Therapy Assistant OT                OT Recommendation and Plan  Anticipated Discharge Disposition: skilled nursing facility  Planned Therapy Interventions: activity intolerance, ADL retraining, balance training, bed mobility training, transfer training  Therapy Frequency: per priority policy              OT Goals       04/18/17 1600 04/15/17 0848       Transfer Training OT LTG    Transfer Training OT LTG, Date Established  04/15/17  -AC     Transfer Training OT LTG, Time to Achieve  by discharge  -AC     Transfer Training OT LTG, Activity Type  bed to chair /chair to bed;toilet  -AC     Transfer Training OT LTG, [place] Level  moderate assist (50% patient effort)  -AC     Transfer Training OT LTG, Assist Device  commode, bedside;walker, rolling  -AC     Transfer Training OT LTG, Date Goal Reviewed 04/18/17  -ND      Transfer Training OT LTG, Outcome goal not met  -ND      Transfer Training OT LTG, Reason Goal Not Met discharged from facility  -ND      Bathing OT LTG    Bathing Goal OT LTG, Date Established  04/15/17  -AC     Bathing Goal OT LTG, Time to Achieve  by discharge  -AC     Bathing Goal OT LTG, Activity Type  lower body bathing  -AC     Bathing Goal OT LTG, [place] Level  minimum assist (75% patient effort)  -AC     Bathing Goal OT LTG, Assist Device  shower chair with back;sponge, long handled;grab bars  -AC     Bathing Goal OT LTG, Date Goal Reviewed 04/18/17  -ND      Bathing Goal OT LTG, Outcome goal not met  -ND      Bathing Goal OT LTG, Reason Goal Not Met discharged from facility  -ND      Toileting OT LTG    Toileting Goal OT  LTG, Date Established  04/15/17  -AC     Toileting Goal OT LTG, Time to Achieve  by discharge  -AC     Toileting Goal OT LTG, [place] Level  moderate assist (50% patient effort)  -AC     Toileting Goal OT LTG, Date Goal Reviewed 04/18/17  -ND      Toileting Goal OT LTG, Outcome goal not met  -ND      Toileting Goal OT LTG, Reason Goal Not Met discharged from facility  -ND      LB Dressing OT LTG    LB Dressing Goal OT LTG, Date Established  04/15/17  -AC     LB Dressing Goal OT LTG, Time to Achieve  by discharge  -AC     LB Dressing Goal OT LTG, [place] Level  moderate assist (50% patient effort)  -AC     LB Dressing Goal OT LTG, Adaptive Equipment  dressing stick;laces, elastic;reacher;shoe horn, long handled;sock-aid  -AC     LB Dressing Goal OT LTG, Date Goal Reviewed 04/18/17  -ND      LB Dressing Goal OT LTG, Outcome goal not met  -ND      LB Dressing Goal OT LTG, Reason Goal Not Met discharged from facility  -ND        User Key  (r) = Recorded By, (t) = Taken By, (c) = Cosigned By    Initials Name Provider Type    AC Naresh Lombardi, OTR/L Occupational Therapist    ND Guillermina Maradiaga, OTR/L Occupational Therapist                Outcome Measures       04/18/17 1100 04/18/17 0944 04/17/17 1420    How much help from another person do you currently need...    Turning from your back to your side while in flat bed without using bedrails? 2  -MS (r) ET (t) MS (c)      Moving from lying on back to sitting on the side of a flat bed without bedrails? 2  -MS (r) ET (t) MS (c)      Moving to and from a bed to a chair (including a wheelchair)? 1  -MS (r) ET (t) MS (c)      Standing up from a chair using your arms (e.g., wheelchair, bedside chair)? 1  -MS (r) ET (t) MS (c)      Climbing 3-5 steps with a railing? 1  -MS (r) ET (t) MS (c)      To walk in hospital room? 1  -MS (r) ET (t) MS (c)      AM-PAC 6 Clicks Score 8  -MS (r) ET (t)      How much help from another is currently needed...    Putting on and  taking off regular lower body clothing?  1  -CH 1  -CJ    Bathing (including washing, rinsing, and drying)  1  -CH 1  -CJ    Toileting (which includes using toilet bed pan or urinal)  1  -CH 1  -CJ    Putting on and taking off regular upper body clothing  2  -CH 1  -CJ    Taking care of personal grooming (such as brushing teeth)  2  -CH 2  -CJ    Eating meals  3  -CH 2  -CJ    Score  10  -CH 8  -CJ    Functional Assessment    Outcome Measure Options AM-PAC 6 Clicks Basic Mobility (PT)  -MS (r) ET (t) MS (c) AM-PAC 6 Clicks Daily Activity (OT)  -CH AM-PAC 6 Clicks Daily Activity (OT)  -CJ      04/17/17 1100 04/16/17 1100       How much help from another person do you currently need...    Turning from your back to your side while in flat bed without using bedrails? 2  -NW 2  -NW     Moving from lying on back to sitting on the side of a flat bed without bedrails? 2  -NW 1  -NW     Moving to and from a bed to a chair (including a wheelchair)? 1  -NW 1  -NW     Standing up from a chair using your arms (e.g., wheelchair, bedside chair)? 1  -NW 1  -NW     Climbing 3-5 steps with a railing? 1  -NW 1  -NW     To walk in hospital room? 1  -NW 1  -NW     AM-PAC 6 Clicks Score 8  -NW 7  -NW     Functional Assessment    Outcome Measure Options AM-PAC 6 Clicks Basic Mobility (PT)  -NW AM-PAC 6 Clicks Basic Mobility (PT)  -NW       User Key  (r) = Recorded By, (t) = Taken By, (c) = Cosigned By    Initials Name Provider Type     Helene Montana, OTR/L Occupational Therapist    CJ Loc Rasmussen, URIAS/L Occupational Therapy Assistant    MS Cecilia Lyons, PT DPT Physical Therapist    NW Ryanne Sweet, RANDOLPH Physical Therapy Assistant    Gutierrez Felipe PTA Student PTA Student          Time Calculation:           OT G-codes  OT Functional Scales Options: AM-PAC 6 Clicks Daily Activity (OT)  Score: 8  Functional Limitation: Self care  Self Care Current Status (): At least 80 percent but less than 100 percent impaired,  limited or restricted  Self Care Goal Status (): At least 60 percent but less than 80 percent impaired, limited or restricted    OT Discharge Summary  Anticipated Discharge Disposition: skilled nursing facility  Reason for Discharge: Discharge from facility  Outcomes Achieved: Refer to plan of care for updates on goals achieved  Discharge Destination: SNF    Guillermina Maradiaga, OTR/L  4/18/2017

## 2017-04-18 NOTE — PROGRESS NOTES
Continued Stay Note   [facility]     Patient Name: Tracee Neal  MRN: [id number]  Today's Date: 4/18/2017    Admit Date: 4/13/2017          Discharge Plan       04/18/17 1240    Case Management/Social Work Plan    Plan [facility]    Final Note    Final Note Patient is discharged back to [facility]. TOÑITO called and notified Richelle in admissions at [facility]. Discharge summary has been faxed to [facility] at [phone]. Patient's nurse will call report to [phone]. Patient to transport by EMS.               Discharge Codes       04/18/17 1239    Discharge Codes    Discharge Codes 03  Discharged/transferred to skilled nursing facility (SNF) with Medicare certification in anticipation of skilled care        Expected Discharge Date and Time     Expected Discharge Date Expected Discharge Time    Apr 18, 2017             ANGÉLICA Gao

## 2017-04-19 NOTE — PLAN OF CARE
Problem: Inpatient Physical Therapy  Goal: Bed Mobility Goal LTG- PT  Outcome: Unable to achieve outcome(s) by discharge Date Met:  04/19/17    04/15/17 0842 04/19/17 1322   Bed Mobility PT LTG   Bed Mobility PT LTG, Date Established 04/15/17 --    Bed Mobility PT LTG, Time to Achieve by discharge --    Bed Mobility PT LTG, Activity Type all bed mobility --    Bed Mobility PT LTG, [place] Level supervision required --    Bed Mobility PT Goal LTG, Assist Device bed rails --    Bed Mobility PT LTG, Date Goal Reviewed --  04/19/17   Bed Mobility PT LTG, Outcome --  goal not met   Bed Mobility PT LTG, Reason Goal Not Met --  discharged from facility       Goal: Transfer Training Goal 1 LTG- PT  Outcome: Unable to achieve outcome(s) by discharge Date Met:  04/19/17    04/15/17 0842 04/19/17 1322   Transfer Training PT LTG   Transfer Training PT LTG, Date Established 04/15/17 --    Transfer Training PT LTG, Time to Achieve by discharge --    Transfer Training PT LTG, Activity Type all transfers --    Transfer Training PT LTG, [place] Level minimum assist (75% patient effort) --    Transfer Training PT LTG, Assist Device other (see comments)  (appropriate) --    Transfer Training PT LTG, Date Goal Reviewed --  04/19/17   Transfer Training PT LTG, Outcome --  goal not met   Transfer Training PT LTG, Reason Goal Not Met --  discharged from facility       Goal: Gait Training Goal LTG- PT  Outcome: Unable to achieve outcome(s) by discharge Date Met:  04/19/17    04/15/17 0842 04/19/17 1322   Gait Training PT LTG   Gait Training Goal PT LTG, Date Established 04/15/17 --    Gait Training Goal PT LTG, Time to Achieve by discharge --    Gait Training Goal PT LTG, [place] Level minimum assist (75% patient effort) --    Gait Training Goal PT LTG, Assist Device walker, rolling --    Gait Training Goal PT LTG, Distance to Achieve 25' --    Gait Training Goal PT LTG, Date Goal Reviewed --  04/19/17   Gait Training Goal  PT LTG, Outcome --  goal not met   Gait Training Goal PT LTG, Reason Goal Not Met --  discharged from facility       Goal: Patient Education Goal LTG- PT  Outcome: Unable to achieve outcome(s) by discharge Date Met:  04/19/17    04/15/17 0842 04/19/17 1322   Patient Education PT LTG   Patient Education PT LTG, Date Established 04/15/17 --    Patient Education PT LTG, Time to Achieve by discharge --    Patient Education PT LTG, Education Type HEP;positioning;progression of POC;benefits of activity;home safety;gait;transfers;bed mobility --    Patient Education PT LTG, Education Understanding demonstrate adequately;verbalize understanding --    Patient Education PT LTG, Date Goal Reviewed --  04/19/17   Patient Education PT LTG Outcome --  goal not met   Patient Education PT LTG, Reason Goal Not Met --  discharged from facility

## 2017-04-19 NOTE — PLAN OF CARE
Problem: Inpatient Physical Therapy  Goal: Bed Mobility Goal LTG- PT  Outcome: Unable to achieve outcome(s) by discharge Date Met:  04/19/17    04/15/17 0842 04/19/17 1326   Bed Mobility PT LTG   Bed Mobility PT LTG, Date Established 04/15/17 --    Bed Mobility PT LTG, Time to Achieve by discharge --    Bed Mobility PT LTG, Activity Type all bed mobility --    Bed Mobility PT LTG, [place] Level supervision required --    Bed Mobility PT Goal LTG, Assist Device bed rails --    Bed Mobility PT LTG, Date Goal Reviewed --  04/19/17   Bed Mobility PT LTG, Outcome --  goal not met   Bed Mobility PT LTG, Reason Goal Not Met --  discharged from facility       Goal: Transfer Training Goal 1 LTG- PT  Outcome: Unable to achieve outcome(s) by discharge Date Met:  04/19/17    04/15/17 0842 04/19/17 1326   Transfer Training PT LTG   Transfer Training PT LTG, Date Established 04/15/17 --    Transfer Training PT LTG, Time to Achieve by discharge --    Transfer Training PT LTG, Activity Type all transfers --    Transfer Training PT LTG, [place] Level minimum assist (75% patient effort) --    Transfer Training PT LTG, Assist Device other (see comments)  (appropriate) --    Transfer Training PT LTG, Date Goal Reviewed --  04/19/17   Transfer Training PT LTG, Outcome --  goal not met   Transfer Training PT LTG, Reason Goal Not Met --  discharged from facility       Goal: Gait Training Goal LTG- PT  Outcome: Unable to achieve outcome(s) by discharge Date Met:  04/19/17    04/15/17 0842 04/19/17 1326   Gait Training PT LTG   Gait Training Goal PT LTG, Date Established 04/15/17 --    Gait Training Goal PT LTG, Time to Achieve by discharge --    Gait Training Goal PT LTG, [place] Level minimum assist (75% patient effort) --    Gait Training Goal PT LTG, Assist Device walker, rolling --    Gait Training Goal PT LTG, Distance to Achieve 25' --    Gait Training Goal PT LTG, Date Goal Reviewed --  04/19/17   Gait Training Goal  PT LTG, Outcome --  goal not met   Gait Training Goal PT LTG, Reason Goal Not Met --  discharged from facility       Goal: Patient Education Goal LTG- PT  Outcome: Unable to achieve outcome(s) by discharge Date Met:  04/19/17    04/15/17 0842 04/19/17 1326   Patient Education PT LTG   Patient Education PT LTG, Date Established 04/15/17 --    Patient Education PT LTG, Time to Achieve by discharge --    Patient Education PT LTG, Education Type HEP;positioning;progression of POC;benefits of activity;home safety;gait;transfers;bed mobility --    Patient Education PT LTG, Education Understanding demonstrate adequately;verbalize understanding --    Patient Education PT LTG, Date Goal Reviewed --  04/19/17   Patient Education PT LTG Outcome --  goal not met   Patient Education PT LTG, Reason Goal Not Met --  discharged from facility

## 2017-04-19 NOTE — THERAPY DISCHARGE NOTE
Acute Care - Physical Therapy Discharge Summary  [facility]       Patient Name: Tracee Neal  : 4/15/1932  MRN: [id number]    Today's Date: 2017  Onset of Illness/Injury or Date of Surgery Date: 17    Date of Referral to PT: 17  Referring Physician: Dr. Locke      Admit Date: 2017      PT Recommendation and Plan    Visit Dx:    ICD-10-CM ICD-9-CM   1. Hip fracture, right, closed, initial encounter S72.001A 820.8   2. Impaired physical mobility Z74.09 781.99   3. Impaired mobility and ADLs Z74.09 799.89   4. Oropharyngeal dysphagia R13.12 787.22   5. Generalized weakness R53.1 780.79             Outcome Measures       17 1100 17 0944 17 1420    How much help from another person do you currently need...    Turning from your back to your side while in flat bed without using bedrails? 2  -MS (r) ET (t) MS (c)      Moving from lying on back to sitting on the side of a flat bed without bedrails? 2  -MS (r) ET (t) MS (c)      Moving to and from a bed to a chair (including a wheelchair)? 1  -MS (r) ET (t) MS (c)      Standing up from a chair using your arms (e.g., wheelchair, bedside chair)? 1  -MS (r) ET (t) MS (c)      Climbing 3-5 steps with a railing? 1  -MS (r) ET (t) MS (c)      To walk in hospital room? 1  -MS (r) ET (t) MS (c)      AM-PAC 6 Clicks Score 8  -MS (r) ET (t)      How much help from another is currently needed...    Putting on and taking off regular lower body clothing?  1  -CH 1  -CJ    Bathing (including washing, rinsing, and drying)  1  -CH 1  -CJ    Toileting (which includes using toilet bed pan or urinal)  1  -CH 1  -CJ    Putting on and taking off regular upper body clothing  2  -CH 1  -CJ    Taking care of personal grooming (such as brushing teeth)  2  -CH 2  -CJ    Eating meals  3  -CH 2  -CJ    Score  10  -CH 8  -CJ    Functional Assessment    Outcome Measure Options AM-PAC 6 Clicks Basic Mobility (PT)  -MS (r) ET (t) MS (c) AM-PAC 6 Clicks Daily  Activity (OT)  - AM-[facility] 6 Clicks Daily Activity (OT)  -CJ      04/17/17 1100          How much help from another person do you currently need...    Turning from your back to your side while in flat bed without using bedrails? 2  -NW      Moving from lying on back to sitting on the side of a flat bed without bedrails? 2  -NW      Moving to and from a bed to a chair (including a wheelchair)? 1  -NW      Standing up from a chair using your arms (e.g., wheelchair, bedside chair)? 1  -NW      Climbing 3-5 steps with a railing? 1  -NW      To walk in hospital room? 1  -NW      AM-PAC 6 Clicks Score 8  -NW      Functional Assessment    Outcome Measure Options AM-[facility] 6 Clicks Basic Mobility (PT)  -NW        User Key  (r) = Recorded By, (t) = Taken By, (c) = Cosigned By    Initials Name Provider Type     Helene Montana, OTR/L Occupational Therapist     Loc Rasmussen, URIAS/L Occupational Therapy Assistant    MS Cecilia Lyons, PT DPT Physical Therapist    NW Ryanne Sweet, PTA Physical Therapy Assistant    ET Breann Felipe, PTA Student PTA Student                      IP PT Goals       04/19/17 1326 04/19/17 1322 04/15/17 0842    Bed Mobility PT LTG    Bed Mobility PT LTG, Date Established   04/15/17  -KR    Bed Mobility PT LTG, Time to Achieve   by discharge  -KR    Bed Mobility PT LTG, Activity Type   all bed mobility  -KR    Bed Mobility PT LTG, [place] Level   supervision required  -KR    Bed Mobility PT Goal  LTG, Assist Device   bed rails  -KR    Bed Mobility PT LTG, Date Goal Reviewed 04/19/17  -KJ 04/19/17  -NW     Bed Mobility PT LTG, Outcome goal not met  -KJ goal not met  -NW     Bed Mobility PT LTG, Reason Goal Not Met discharged from facility  -KJ discharged from facility  -NW     Transfer Training PT LTG    Transfer Training PT LTG, Date Established   04/15/17  -KR    Transfer Training PT LTG, Time to Achieve   by discharge  -KR    Transfer Training PT LTG, Activity Type   all transfers  -KR     Transfer Training PT LTG, [place] Level   minimum assist (75% patient effort)  -KR    Transfer Training PT LTG, Assist Device   other (see comments)   appropriate  -KR    Transfer Training PT  LTG, Date Goal Reviewed 04/19/17  -KJ 04/19/17  -NW     Transfer Training PT LTG, Outcome goal not met  -KJ goal not met  -NW     Transfer Training PT LTG, Reason Goal Not Met discharged from [facility]  - discharged from [facility]  -NW     Gait Training PT LTG    Gait Training Goal PT LTG, Date Established   04/15/17  -KR    Gait Training Goal PT LTG, Time to Achieve   by discharge  -KR    Gait Training Goal PT LTG, [place] Level   minimum assist (75% patient effort)  -KR    Gait Training Goal PT LTG, Assist Device   walker, rolling  -KR    Gait Training Goal PT LTG, Distance to Achieve   25'  -KR    Gait Training Goal PT LTG, Date Goal Reviewed 04/19/17  -KJ 04/19/17  -NW     Gait Training Goal PT LTG, Outcome goal not met  -KJ goal not met  -NW     Gait Training Goal PT LTG, Reason Goal Not Met discharged from [facility] discharged from [facility]  -NW     Patient Education PT LTG    Patient Education PT LTG, Date Established   04/15/17  -KR    Patient Education PT LTG, Time to Achieve   by discharge  -KR    Patient Education PT LTG, Education Type   HEP;positioning;progression of POC;benefits of activity;home safety;gait;transfers;bed mobility  -KR    Patient Education PT LTG, Education Understanding   demonstrate adequately;verbalize understanding  -KR    Patient Education PT LTG, Date Goal Reviewed 04/19/17  -KJ 04/19/17  -NW     Patient Education PT LTG Outcome goal not met  -KJ goal not met  -NW     Patient Education PT LTG, Reason Goal Not Met discharged from [facility] discharged from [facility]  -       User Key  (r) = Recorded By, (t) = Taken By, (c) = Cosigned By    Initials Name Provider Type    AMADO Carbajal, PTA Physical Therapy Assistant    JOSE Gonzalez, PT DPT Physical Therapist     STUART Sweet, RANDOLPH Physical Therapy Assistant              PT Discharge Summary  Anticipated Discharge Disposition: skilled nursing facility  Reason for Discharge: Per MD order  Outcomes Achieved: Refer to plan of care for updates on goals achieved  Discharge Destination: SNF      Abigail Carbajal PTA   4/19/2017

## 2017-04-19 NOTE — THERAPY DISCHARGE NOTE
Acute Care - Physical Therapy Discharge Summary  [facility]       Patient Name: Tracee Neal  : 4/15/1932  MRN: [id number]    Today's Date: 2017  Onset of Illness/Injury or Date of Surgery Date: 17    Date of Referral to PT: 17  Referring Physician: Dr. Locke      Admit Date: 2017      PT Recommendation and Plan    Visit Dx:    ICD-10-CM ICD-9-CM   1. Hip fracture, right, closed, initial encounter S72.001A 820.8   2. Impaired physical mobility Z74.09 781.99   3. Impaired mobility and ADLs Z74.09 799.89   4. Oropharyngeal dysphagia R13.12 787.22   5. Generalized weakness R53.1 780.79             Outcome Measures       17 1100 17 0944 17 1420    How much help from another person do you currently need...    Turning from your back to your side while in flat bed without using bedrails? 2  -MS (r) ET (t) MS (c)      Moving from lying on back to sitting on the side of a flat bed without bedrails? 2  -MS (r) ET (t) MS (c)      Moving to and from a bed to a chair (including a wheelchair)? 1  -MS (r) ET (t) MS (c)      Standing up from a chair using your arms (e.g., wheelchair, bedside chair)? 1  -MS (r) ET (t) MS (c)      Climbing 3-5 steps with a railing? 1  -MS (r) ET (t) MS (c)      To walk in hospital room? 1  -MS (r) ET (t) MS (c)      AM-PAC 6 Clicks Score 8  -MS (r) ET (t)      How much help from another is currently needed...    Putting on and taking off regular lower body clothing?  1  -CH 1  -CJ    Bathing (including washing, rinsing, and drying)  1  -CH 1  -CJ    Toileting (which includes using toilet bed pan or urinal)  1  -CH 1  -CJ    Putting on and taking off regular upper body clothing  2  -CH 1  -CJ    Taking care of personal grooming (such as brushing teeth)  2  -CH 2  -CJ    Eating meals  3  -CH 2  -CJ    Score  10  -CH 8  -CJ    Functional Assessment    Outcome Measure Options AM-PAC 6 Clicks Basic Mobility (PT)  -MS (r) ET (t) MS (c) AM-PAC 6 Clicks Daily  Activity (OT)  - AM-[facility] 6 Clicks Daily Activity (OT)  -CJ      04/17/17 1100          How much help from another person do you currently need...    Turning from your back to your side while in flat bed without using bedrails? 2  -NW      Moving from lying on back to sitting on the side of a flat bed without bedrails? 2  -NW      Moving to and from a bed to a chair (including a wheelchair)? 1  -NW      Standing up from a chair using your arms (e.g., wheelchair, bedside chair)? 1  -NW      Climbing 3-5 steps with a railing? 1  -NW      To walk in hospital room? 1  -NW      AM-PAC 6 Clicks Score 8  -NW      Functional Assessment    Outcome Measure Options AM-[facility] 6 Clicks Basic Mobility (PT)  -NW        User Key  (r) = Recorded By, (t) = Taken By, (c) = Cosigned By    Initials Name Provider Type     Helene Montana, OTR/L Occupational Therapist     Loc Rasmussen, URIAS/L Occupational Therapy Assistant    MS Cecliia Lyons, PT DPT Physical Therapist    NW Ryanne Sweet, PTA Physical Therapy Assistant    ET Breann Felipe, PTA Student PTA Student                      IP PT Goals       04/19/17 1326 04/19/17 1322 04/15/17 0842    Bed Mobility PT LTG    Bed Mobility PT LTG, Date Established   04/15/17  -KR    Bed Mobility PT LTG, Time to Achieve   by discharge  -KR    Bed Mobility PT LTG, Activity Type   all bed mobility  -KR    Bed Mobility PT LTG, [place] Level   supervision required  -KR    Bed Mobility PT Goal  LTG, Assist Device   bed rails  -KR    Bed Mobility PT LTG, Date Goal Reviewed 04/19/17  -KJ 04/19/17  -NW     Bed Mobility PT LTG, Outcome goal not met  -KJ goal not met  -NW     Bed Mobility PT LTG, Reason Goal Not Met discharged from facility  -KJ discharged from facility  -NW     Transfer Training PT LTG    Transfer Training PT LTG, Date Established   04/15/17  -KR    Transfer Training PT LTG, Time to Achieve   by discharge  -KR    Transfer Training PT LTG, Activity Type   all transfers  -KR     Transfer Training PT LTG, [place] Level   minimum assist (75% patient effort)  -KR    Transfer Training PT LTG, Assist Device   other (see comments)   appropriate  -KR    Transfer Training PT  LTG, Date Goal Reviewed 04/19/17  -KJ 04/19/17  -NW     Transfer Training PT LTG, Outcome goal not met  -KJ goal not met  -NW     Transfer Training PT LTG, Reason Goal Not Met discharged from [facility]  - discharged from [facility]  -NW     Gait Training PT LTG    Gait Training Goal PT LTG, Date Established   04/15/17  -KR    Gait Training Goal PT LTG, Time to Achieve   by discharge  -KR    Gait Training Goal PT LTG, [place] Level   minimum assist (75% patient effort)  -KR    Gait Training Goal PT LTG, Assist Device   walker, rolling  -KR    Gait Training Goal PT LTG, Distance to Achieve   25'  -KR    Gait Training Goal PT LTG, Date Goal Reviewed 04/19/17  -KJ 04/19/17  -NW     Gait Training Goal PT LTG, Outcome goal not met  -KJ goal not met  -NW     Gait Training Goal PT LTG, Reason Goal Not Met discharged from [facility] discharged from [facility]  -NW     Patient Education PT LTG    Patient Education PT LTG, Date Established   04/15/17  -KR    Patient Education PT LTG, Time to Achieve   by discharge  -KR    Patient Education PT LTG, Education Type   HEP;positioning;progression of POC;benefits of activity;home safety;gait;transfers;bed mobility  -KR    Patient Education PT LTG, Education Understanding   demonstrate adequately;verbalize understanding  -KR    Patient Education PT LTG, Date Goal Reviewed 04/19/17  -KJ 04/19/17  -NW     Patient Education PT LTG Outcome goal not met  -KJ goal not met  -NW     Patient Education PT LTG, Reason Goal Not Met discharged from [facility] discharged from [facility]  -       User Key  (r) = Recorded By, (t) = Taken By, (c) = Cosigned By    Initials Name Provider Type    AMADO Carbajal, PTA Physical Therapy Assistant    JOSE Gonzalez, PT DPT Physical Therapist     NW Ryanne Sweet PTA Physical Therapy Assistant              PT Discharge Summary  Reason for Discharge: Discharge from facility  Outcomes Achieved: Refer to plan of care for updates on goals achieved  Discharge Destination: [facility]      Ryanne Sweet PTA   4/19/2017

## 2017-05-01 ENCOUNTER — LAB REQUISITION (OUTPATIENT)
Dept: LAB | Facility: HOSPITAL | Age: 82
End: 2017-05-01

## 2017-05-01 DIAGNOSIS — Z00.00 ENCOUNTER FOR GENERAL ADULT MEDICAL EXAMINATION WITHOUT ABNORMAL FINDINGS: ICD-10-CM

## 2017-05-01 LAB
BACTERIA UR QL AUTO: ABNORMAL /HPF
BILIRUB UR QL STRIP: NEGATIVE
CLARITY UR: CLEAR
COLOR UR: YELLOW
GLUCOSE UR STRIP-MCNC: NEGATIVE MG/DL
HGB UR QL STRIP.AUTO: NEGATIVE
HYALINE CASTS UR QL AUTO: ABNORMAL /LPF
KETONES UR QL STRIP: NEGATIVE
LEUKOCYTE ESTERASE UR QL STRIP.AUTO: ABNORMAL
NITRITE UR QL STRIP: NEGATIVE
PH UR STRIP.AUTO: 6.5 [PH] (ref 5–8)
PROT UR QL STRIP: NEGATIVE
RBC # UR: ABNORMAL /HPF
REF LAB TEST METHOD: ABNORMAL
SP GR UR STRIP: 1.01 (ref 1–1.03)
SQUAMOUS #/AREA URNS HPF: ABNORMAL /HPF
UROBILINOGEN UR QL STRIP: ABNORMAL
WBC UR QL AUTO: ABNORMAL /HPF

## 2017-05-01 PROCEDURE — 87086 URINE CULTURE/COLONY COUNT: CPT | Performed by: NURSE PRACTITIONER

## 2017-05-01 PROCEDURE — 87077 CULTURE AEROBIC IDENTIFY: CPT | Performed by: NURSE PRACTITIONER

## 2017-05-01 PROCEDURE — 81001 URINALYSIS AUTO W/SCOPE: CPT | Performed by: NURSE PRACTITIONER

## 2017-05-01 PROCEDURE — 87186 SC STD MICRODIL/AGAR DIL: CPT | Performed by: NURSE PRACTITIONER

## 2017-05-03 LAB — BACTERIA SPEC AEROBE CULT: ABNORMAL

## 2017-06-22 ENCOUNTER — LAB REQUISITION (OUTPATIENT)
Dept: LAB | Facility: HOSPITAL | Age: 82
End: 2017-06-22

## 2017-06-22 DIAGNOSIS — Z00.00 ENCOUNTER FOR GENERAL ADULT MEDICAL EXAMINATION WITHOUT ABNORMAL FINDINGS: ICD-10-CM

## 2017-06-22 LAB
BACTERIA UR QL AUTO: ABNORMAL /HPF
BILIRUB UR QL STRIP: NEGATIVE
CLARITY UR: ABNORMAL
COLOR UR: YELLOW
GLUCOSE UR STRIP-MCNC: NEGATIVE MG/DL
HGB UR QL STRIP.AUTO: NEGATIVE
HYALINE CASTS UR QL AUTO: ABNORMAL /LPF
KETONES UR QL STRIP: NEGATIVE
LEUKOCYTE ESTERASE UR QL STRIP.AUTO: ABNORMAL
NITRITE UR QL STRIP: POSITIVE
PH UR STRIP.AUTO: 6 [PH] (ref 5–8)
PROT UR QL STRIP: NEGATIVE
RBC # UR: ABNORMAL /HPF
REF LAB TEST METHOD: ABNORMAL
SP GR UR STRIP: 1.02 (ref 1–1.03)
SQUAMOUS #/AREA URNS HPF: ABNORMAL /HPF
UROBILINOGEN UR QL STRIP: ABNORMAL
WBC UR QL AUTO: ABNORMAL /HPF

## 2017-06-22 PROCEDURE — 81001 URINALYSIS AUTO W/SCOPE: CPT | Performed by: NURSE PRACTITIONER

## 2017-06-22 PROCEDURE — 87086 URINE CULTURE/COLONY COUNT: CPT | Performed by: NURSE PRACTITIONER

## 2017-06-22 PROCEDURE — 87088 URINE BACTERIA CULTURE: CPT | Performed by: NURSE PRACTITIONER

## 2017-06-22 PROCEDURE — 87186 SC STD MICRODIL/AGAR DIL: CPT | Performed by: NURSE PRACTITIONER

## 2017-06-24 LAB — BACTERIA SPEC AEROBE CULT: ABNORMAL

## 2017-07-03 ENCOUNTER — LAB REQUISITION (OUTPATIENT)
Dept: LAB | Facility: HOSPITAL | Age: 82
End: 2017-07-03

## 2017-07-03 DIAGNOSIS — Z00.00 ENCOUNTER FOR GENERAL ADULT MEDICAL EXAMINATION WITHOUT ABNORMAL FINDINGS: ICD-10-CM

## 2017-07-03 LAB
ANION GAP SERPL CALCULATED.3IONS-SCNC: 9 MMOL/L (ref 4–13)
BUN BLD-MCNC: 10 MG/DL (ref 5–21)
BUN/CREAT SERPL: 15.2 (ref 7–25)
CALCIUM SPEC-SCNC: 8.5 MG/DL (ref 8.4–10.4)
CHLORIDE SERPL-SCNC: 95 MMOL/L (ref 98–110)
CO2 SERPL-SCNC: 26 MMOL/L (ref 24–31)
CREAT BLD-MCNC: 0.66 MG/DL (ref 0.5–1.4)
GFR SERPL CREATININE-BSD FRML MDRD: 85 ML/MIN/1.73
GLUCOSE BLD-MCNC: 67 MG/DL (ref 70–100)
POTASSIUM BLD-SCNC: 4.2 MMOL/L (ref 3.5–5.3)
SODIUM BLD-SCNC: 130 MMOL/L (ref 135–145)

## 2017-07-03 PROCEDURE — 36415 COLL VENOUS BLD VENIPUNCTURE: CPT | Performed by: NURSE PRACTITIONER

## 2017-07-03 PROCEDURE — 80048 BASIC METABOLIC PNL TOTAL CA: CPT | Performed by: NURSE PRACTITIONER

## 2017-07-19 ENCOUNTER — LAB REQUISITION (OUTPATIENT)
Dept: LAB | Facility: HOSPITAL | Age: 82
End: 2017-07-19

## 2017-07-19 DIAGNOSIS — Z00.00 ENCOUNTER FOR GENERAL ADULT MEDICAL EXAMINATION WITHOUT ABNORMAL FINDINGS: ICD-10-CM

## 2017-07-19 LAB
ALBUMIN SERPL-MCNC: 2.7 G/DL (ref 3.5–5)
ALBUMIN/GLOB SERPL: 0.9 G/DL (ref 1.1–2.5)
ALP SERPL-CCNC: 89 U/L (ref 24–120)
ALT SERPL W P-5'-P-CCNC: 30 U/L (ref 0–54)
ANION GAP SERPL CALCULATED.3IONS-SCNC: 8 MMOL/L (ref 4–13)
AST SERPL-CCNC: 18 U/L (ref 7–45)
BASOPHILS # BLD AUTO: 0.07 10*3/MM3 (ref 0–0.2)
BASOPHILS NFR BLD AUTO: 1.1 % (ref 0–2)
BILIRUB SERPL-MCNC: 0.6 MG/DL (ref 0.1–1)
BUN BLD-MCNC: 16 MG/DL (ref 5–21)
BUN/CREAT SERPL: 23.5 (ref 7–25)
CALCIUM SPEC-SCNC: 8.5 MG/DL (ref 8.4–10.4)
CHLORIDE SERPL-SCNC: 101 MMOL/L (ref 98–110)
CO2 SERPL-SCNC: 22 MMOL/L (ref 24–31)
CREAT BLD-MCNC: 0.68 MG/DL (ref 0.5–1.4)
DEPRECATED RDW RBC AUTO: 48.8 FL (ref 40–54)
EOSINOPHIL # BLD AUTO: 0 10*3/MM3 (ref 0–0.7)
EOSINOPHIL NFR BLD AUTO: 0 % (ref 0–4)
ERYTHROCYTE [DISTWIDTH] IN BLOOD BY AUTOMATED COUNT: 14.5 % (ref 12–15)
GFR SERPL CREATININE-BSD FRML MDRD: 82 ML/MIN/1.73
GLOBULIN UR ELPH-MCNC: 3 GM/DL
GLUCOSE BLD-MCNC: 56 MG/DL (ref 70–100)
HCT VFR BLD AUTO: 32.3 % (ref 37–47)
HGB BLD-MCNC: 10.7 G/DL (ref 12–16)
IMM GRANULOCYTES # BLD: 0.03 10*3/MM3 (ref 0–0.03)
IMM GRANULOCYTES NFR BLD: 0.5 % (ref 0–5)
LYMPHOCYTES # BLD AUTO: 1.07 10*3/MM3 (ref 0.72–4.86)
LYMPHOCYTES NFR BLD AUTO: 17.5 % (ref 15–45)
MCH RBC QN AUTO: 30.4 PG (ref 28–32)
MCHC RBC AUTO-ENTMCNC: 33.1 G/DL (ref 33–36)
MCV RBC AUTO: 91.8 FL (ref 82–98)
MONOCYTES # BLD AUTO: 0.76 10*3/MM3 (ref 0.19–1.3)
MONOCYTES NFR BLD AUTO: 12.5 % (ref 4–12)
NEUTROPHILS # BLD AUTO: 4.17 10*3/MM3 (ref 1.87–8.4)
NEUTROPHILS NFR BLD AUTO: 68.4 % (ref 39–78)
PLATELET # BLD AUTO: 281 10*3/MM3 (ref 130–400)
PMV BLD AUTO: 11.1 FL (ref 6–12)
POTASSIUM BLD-SCNC: 4.2 MMOL/L (ref 3.5–5.3)
PROT SERPL-MCNC: 5.7 G/DL (ref 6.3–8.7)
RBC # BLD AUTO: 3.52 10*6/MM3 (ref 4.2–5.4)
SODIUM BLD-SCNC: 131 MMOL/L (ref 135–145)
WBC NRBC COR # BLD: 6.1 10*3/MM3 (ref 4.8–10.8)

## 2017-07-19 PROCEDURE — 85025 COMPLETE CBC W/AUTO DIFF WBC: CPT | Performed by: NURSE PRACTITIONER

## 2017-07-19 PROCEDURE — 80053 COMPREHEN METABOLIC PANEL: CPT | Performed by: NURSE PRACTITIONER

## 2017-07-19 PROCEDURE — 36415 COLL VENOUS BLD VENIPUNCTURE: CPT | Performed by: NURSE PRACTITIONER

## 2017-07-22 ENCOUNTER — LAB REQUISITION (OUTPATIENT)
Dept: LAB | Facility: HOSPITAL | Age: 82
End: 2017-07-22

## 2017-07-22 DIAGNOSIS — Z00.00 ENCOUNTER FOR GENERAL ADULT MEDICAL EXAMINATION WITHOUT ABNORMAL FINDINGS: ICD-10-CM

## 2017-07-22 LAB
BILIRUB UR QL STRIP: NEGATIVE
CLARITY UR: ABNORMAL
COLOR UR: ABNORMAL
GLUCOSE UR STRIP-MCNC: NEGATIVE MG/DL
HGB UR QL STRIP.AUTO: NEGATIVE
KETONES UR QL STRIP: ABNORMAL
LEUKOCYTE ESTERASE UR QL STRIP.AUTO: ABNORMAL
NITRITE UR QL STRIP: POSITIVE
PH UR STRIP.AUTO: 7 [PH] (ref 5–8)
PROT UR QL STRIP: ABNORMAL
SP GR UR STRIP: 1.02 (ref 1–1.03)
UROBILINOGEN UR QL STRIP: ABNORMAL

## 2017-07-22 PROCEDURE — 87186 SC STD MICRODIL/AGAR DIL: CPT | Performed by: NURSE PRACTITIONER

## 2017-07-22 PROCEDURE — 87086 URINE CULTURE/COLONY COUNT: CPT | Performed by: NURSE PRACTITIONER

## 2017-07-22 PROCEDURE — 87077 CULTURE AEROBIC IDENTIFY: CPT | Performed by: NURSE PRACTITIONER

## 2017-07-22 PROCEDURE — 81003 URINALYSIS AUTO W/O SCOPE: CPT | Performed by: NURSE PRACTITIONER

## 2017-07-22 PROCEDURE — 87088 URINE BACTERIA CULTURE: CPT | Performed by: NURSE PRACTITIONER

## 2017-07-25 LAB
BACTERIA SPEC AEROBE CULT: ABNORMAL
BACTERIA SPEC AEROBE CULT: ABNORMAL

## 2017-07-28 ENCOUNTER — LAB REQUISITION (OUTPATIENT)
Dept: LAB | Facility: HOSPITAL | Age: 82
End: 2017-07-28

## 2017-07-28 DIAGNOSIS — Z00.00 ENCOUNTER FOR GENERAL ADULT MEDICAL EXAMINATION WITHOUT ABNORMAL FINDINGS: ICD-10-CM

## 2017-07-28 LAB
ANION GAP SERPL CALCULATED.3IONS-SCNC: 8 MMOL/L (ref 4–13)
BUN BLD-MCNC: 15 MG/DL (ref 5–21)
BUN/CREAT SERPL: 19 (ref 7–25)
CALCIUM SPEC-SCNC: 8.5 MG/DL (ref 8.4–10.4)
CHLORIDE SERPL-SCNC: 101 MMOL/L (ref 98–110)
CO2 SERPL-SCNC: 22 MMOL/L (ref 24–31)
CREAT BLD-MCNC: 0.79 MG/DL (ref 0.5–1.4)
GFR SERPL CREATININE-BSD FRML MDRD: 69 ML/MIN/1.73
GLUCOSE BLD-MCNC: 60 MG/DL (ref 70–100)
POTASSIUM BLD-SCNC: 4.5 MMOL/L (ref 3.5–5.3)
SODIUM BLD-SCNC: 131 MMOL/L (ref 135–145)

## 2017-07-28 PROCEDURE — 80048 BASIC METABOLIC PNL TOTAL CA: CPT | Performed by: NURSE PRACTITIONER

## 2017-07-28 PROCEDURE — 36415 COLL VENOUS BLD VENIPUNCTURE: CPT | Performed by: NURSE PRACTITIONER

## 2017-08-10 ENCOUNTER — LAB REQUISITION (OUTPATIENT)
Dept: LAB | Facility: HOSPITAL | Age: 82
End: 2017-08-10

## 2017-08-10 DIAGNOSIS — Z00.00 ENCOUNTER FOR GENERAL ADULT MEDICAL EXAMINATION WITHOUT ABNORMAL FINDINGS: ICD-10-CM

## 2017-08-10 LAB
ANION GAP SERPL CALCULATED.3IONS-SCNC: 10 MMOL/L (ref 4–13)
BASOPHILS # BLD AUTO: 0.06 10*3/MM3 (ref 0–0.2)
BASOPHILS NFR BLD AUTO: 0.7 % (ref 0–2)
BUN BLD-MCNC: 12 MG/DL (ref 5–21)
BUN/CREAT SERPL: 14.3 (ref 7–25)
CALCIUM SPEC-SCNC: 8.8 MG/DL (ref 8.4–10.4)
CHLORIDE SERPL-SCNC: 101 MMOL/L (ref 98–110)
CO2 SERPL-SCNC: 23 MMOL/L (ref 24–31)
CREAT BLD-MCNC: 0.84 MG/DL (ref 0.5–1.4)
DEPRECATED RDW RBC AUTO: 50 FL (ref 40–54)
EOSINOPHIL # BLD AUTO: 0 10*3/MM3 (ref 0–0.7)
EOSINOPHIL NFR BLD AUTO: 0 % (ref 0–4)
ERYTHROCYTE [DISTWIDTH] IN BLOOD BY AUTOMATED COUNT: 14.7 % (ref 12–15)
GFR SERPL CREATININE-BSD FRML MDRD: 64 ML/MIN/1.73
GLUCOSE BLD-MCNC: 91 MG/DL (ref 70–100)
HCT VFR BLD AUTO: 36.4 % (ref 37–47)
HGB BLD-MCNC: 12 G/DL (ref 12–16)
IMM GRANULOCYTES # BLD: 0.04 10*3/MM3 (ref 0–0.03)
IMM GRANULOCYTES NFR BLD: 0.5 % (ref 0–5)
LYMPHOCYTES # BLD AUTO: 0.92 10*3/MM3 (ref 0.72–4.86)
LYMPHOCYTES NFR BLD AUTO: 11.2 % (ref 15–45)
MCH RBC QN AUTO: 30.6 PG (ref 28–32)
MCHC RBC AUTO-ENTMCNC: 33 G/DL (ref 33–36)
MCV RBC AUTO: 92.9 FL (ref 82–98)
MONOCYTES # BLD AUTO: 0.74 10*3/MM3 (ref 0.19–1.3)
MONOCYTES NFR BLD AUTO: 9 % (ref 4–12)
NEUTROPHILS # BLD AUTO: 6.44 10*3/MM3 (ref 1.87–8.4)
NEUTROPHILS NFR BLD AUTO: 78.6 % (ref 39–78)
PLATELET # BLD AUTO: 310 10*3/MM3 (ref 130–400)
PMV BLD AUTO: 11.5 FL (ref 6–12)
POTASSIUM BLD-SCNC: 4.1 MMOL/L (ref 3.5–5.3)
RBC # BLD AUTO: 3.92 10*6/MM3 (ref 4.2–5.4)
SODIUM BLD-SCNC: 134 MMOL/L (ref 135–145)
WBC NRBC COR # BLD: 8.2 10*3/MM3 (ref 4.8–10.8)

## 2017-08-10 PROCEDURE — 80048 BASIC METABOLIC PNL TOTAL CA: CPT | Performed by: NURSE PRACTITIONER

## 2017-08-10 PROCEDURE — 85025 COMPLETE CBC W/AUTO DIFF WBC: CPT | Performed by: NURSE PRACTITIONER

## 2017-08-10 PROCEDURE — 36415 COLL VENOUS BLD VENIPUNCTURE: CPT | Performed by: NURSE PRACTITIONER

## 2017-08-11 ENCOUNTER — LAB REQUISITION (OUTPATIENT)
Dept: LAB | Facility: HOSPITAL | Age: 82
End: 2017-08-11

## 2017-08-11 DIAGNOSIS — Z00.00 ENCOUNTER FOR GENERAL ADULT MEDICAL EXAMINATION WITHOUT ABNORMAL FINDINGS: ICD-10-CM

## 2017-08-11 LAB
BACTERIA UR QL AUTO: ABNORMAL /HPF
BILIRUB UR QL STRIP: NEGATIVE
CLARITY UR: ABNORMAL
COLOR UR: ABNORMAL
GLUCOSE UR STRIP-MCNC: NEGATIVE MG/DL
HGB UR QL STRIP.AUTO: ABNORMAL
KETONES UR QL STRIP: NEGATIVE
LEUKOCYTE ESTERASE UR QL STRIP.AUTO: ABNORMAL
NITRITE UR QL STRIP: NEGATIVE
PH UR STRIP.AUTO: 7.5 [PH] (ref 5–8)
PROT UR QL STRIP: ABNORMAL
RBC # UR: ABNORMAL /HPF
REF LAB TEST METHOD: ABNORMAL
SP GR UR STRIP: 1.01 (ref 1–1.03)
SQUAMOUS #/AREA URNS HPF: ABNORMAL /HPF
UROBILINOGEN UR QL STRIP: ABNORMAL
WBC UR QL AUTO: ABNORMAL /HPF

## 2017-08-11 PROCEDURE — 81003 URINALYSIS AUTO W/O SCOPE: CPT | Performed by: NURSE PRACTITIONER

## 2017-08-11 PROCEDURE — 81001 URINALYSIS AUTO W/SCOPE: CPT | Performed by: NURSE PRACTITIONER

## 2017-08-11 PROCEDURE — 87086 URINE CULTURE/COLONY COUNT: CPT | Performed by: NURSE PRACTITIONER

## 2017-08-11 PROCEDURE — 87077 CULTURE AEROBIC IDENTIFY: CPT | Performed by: NURSE PRACTITIONER

## 2017-08-11 PROCEDURE — 87186 SC STD MICRODIL/AGAR DIL: CPT | Performed by: NURSE PRACTITIONER

## 2017-08-14 LAB
BACTERIA SPEC AEROBE CULT: ABNORMAL
BACTERIA SPEC AEROBE CULT: ABNORMAL

## 2017-10-23 ENCOUNTER — LAB REQUISITION (OUTPATIENT)
Dept: LAB | Facility: HOSPITAL | Age: 82
End: 2017-10-23

## 2017-10-23 DIAGNOSIS — Z00.00 ENCOUNTER FOR GENERAL ADULT MEDICAL EXAMINATION WITHOUT ABNORMAL FINDINGS: ICD-10-CM

## 2017-10-23 LAB
ALBUMIN SERPL-MCNC: 3.5 G/DL (ref 3.5–5)
ALBUMIN/GLOB SERPL: 1 G/DL (ref 1.1–2.5)
ALP SERPL-CCNC: 68 U/L (ref 24–120)
ALT SERPL W P-5'-P-CCNC: 24 U/L (ref 0–54)
ANION GAP SERPL CALCULATED.3IONS-SCNC: 11 MMOL/L (ref 4–13)
AST SERPL-CCNC: 26 U/L (ref 7–45)
BACTERIA UR QL AUTO: ABNORMAL /HPF
BASOPHILS # BLD AUTO: 0.05 10*3/MM3 (ref 0–0.2)
BASOPHILS NFR BLD AUTO: 0.6 % (ref 0–2)
BILIRUB SERPL-MCNC: 0.4 MG/DL (ref 0.1–1)
BILIRUB UR QL STRIP: NEGATIVE
BUN BLD-MCNC: 24 MG/DL (ref 5–21)
BUN/CREAT SERPL: 30 (ref 7–25)
CALCIUM SPEC-SCNC: 9.5 MG/DL (ref 8.4–10.4)
CHLORIDE SERPL-SCNC: 99 MMOL/L (ref 98–110)
CLARITY UR: ABNORMAL
CO2 SERPL-SCNC: 22 MMOL/L (ref 24–31)
COD CRY URNS QL: ABNORMAL /HPF
COLOR UR: YELLOW
CREAT BLD-MCNC: 0.8 MG/DL (ref 0.5–1.4)
DEPRECATED RDW RBC AUTO: 44.2 FL (ref 40–54)
EOSINOPHIL # BLD AUTO: 0.01 10*3/MM3 (ref 0–0.7)
EOSINOPHIL NFR BLD AUTO: 0.1 % (ref 0–4)
ERYTHROCYTE [DISTWIDTH] IN BLOOD BY AUTOMATED COUNT: 13.1 % (ref 12–15)
GFR SERPL CREATININE-BSD FRML MDRD: 68 ML/MIN/1.73
GLOBULIN UR ELPH-MCNC: 3.4 GM/DL
GLUCOSE BLD-MCNC: 77 MG/DL (ref 70–100)
GLUCOSE UR STRIP-MCNC: NEGATIVE MG/DL
HCT VFR BLD AUTO: 36.2 % (ref 37–47)
HGB BLD-MCNC: 12.3 G/DL (ref 12–16)
HGB UR QL STRIP.AUTO: NEGATIVE
HYALINE CASTS UR QL AUTO: ABNORMAL /LPF
IMM GRANULOCYTES # BLD: 0.03 10*3/MM3 (ref 0–0.03)
IMM GRANULOCYTES NFR BLD: 0.4 % (ref 0–5)
KETONES UR QL STRIP: ABNORMAL
LEUKOCYTE ESTERASE UR QL STRIP.AUTO: ABNORMAL
LYMPHOCYTES # BLD AUTO: 1.58 10*3/MM3 (ref 0.72–4.86)
LYMPHOCYTES NFR BLD AUTO: 20.5 % (ref 15–45)
MCH RBC QN AUTO: 31.1 PG (ref 28–32)
MCHC RBC AUTO-ENTMCNC: 34 G/DL (ref 33–36)
MCV RBC AUTO: 91.4 FL (ref 82–98)
MONOCYTES # BLD AUTO: 0.68 10*3/MM3 (ref 0.19–1.3)
MONOCYTES NFR BLD AUTO: 8.8 % (ref 4–12)
NEUTROPHILS # BLD AUTO: 5.35 10*3/MM3 (ref 1.87–8.4)
NEUTROPHILS NFR BLD AUTO: 69.6 % (ref 39–78)
NITRITE UR QL STRIP: NEGATIVE
PH UR STRIP.AUTO: <=5 [PH] (ref 5–8)
PLATELET # BLD AUTO: 397 10*3/MM3 (ref 130–400)
PMV BLD AUTO: 10.9 FL (ref 6–12)
POTASSIUM BLD-SCNC: 4.9 MMOL/L (ref 3.5–5.3)
PROT SERPL-MCNC: 6.9 G/DL (ref 6.3–8.7)
PROT UR QL STRIP: NEGATIVE
RBC # BLD AUTO: 3.96 10*6/MM3 (ref 4.2–5.4)
RBC # UR: ABNORMAL /HPF
REF LAB TEST METHOD: ABNORMAL
SODIUM BLD-SCNC: 132 MMOL/L (ref 135–145)
SP GR UR STRIP: 1.02 (ref 1–1.03)
SQUAMOUS #/AREA URNS HPF: ABNORMAL /HPF
UROBILINOGEN UR QL STRIP: ABNORMAL
WBC NRBC COR # BLD: 7.7 10*3/MM3 (ref 4.8–10.8)
WBC UR QL AUTO: ABNORMAL /HPF

## 2017-10-23 PROCEDURE — 87088 URINE BACTERIA CULTURE: CPT | Performed by: NURSE PRACTITIONER

## 2017-10-23 PROCEDURE — 87086 URINE CULTURE/COLONY COUNT: CPT | Performed by: NURSE PRACTITIONER

## 2017-10-23 PROCEDURE — 81001 URINALYSIS AUTO W/SCOPE: CPT | Performed by: NURSE PRACTITIONER

## 2017-10-23 PROCEDURE — 87186 SC STD MICRODIL/AGAR DIL: CPT | Performed by: NURSE PRACTITIONER

## 2017-10-25 LAB
BACTERIA SPEC AEROBE CULT: ABNORMAL
BACTERIA SPEC AEROBE CULT: ABNORMAL

## 2018-01-07 ENCOUNTER — LAB REQUISITION (OUTPATIENT)
Dept: LAB | Facility: HOSPITAL | Age: 83
End: 2018-01-07

## 2018-01-07 DIAGNOSIS — Z00.00 ENCOUNTER FOR GENERAL ADULT MEDICAL EXAMINATION WITHOUT ABNORMAL FINDINGS: ICD-10-CM

## 2018-01-07 LAB
BACTERIA UR QL AUTO: ABNORMAL /HPF
BILIRUB UR QL STRIP: NEGATIVE
CLARITY UR: CLEAR
COLOR UR: YELLOW
GLUCOSE UR STRIP-MCNC: NEGATIVE MG/DL
HGB UR QL STRIP.AUTO: NEGATIVE
KETONES UR QL STRIP: NEGATIVE
LEUKOCYTE ESTERASE UR QL STRIP.AUTO: ABNORMAL
NITRITE UR QL STRIP: POSITIVE
PH UR STRIP.AUTO: 5.5 [PH] (ref 5–8)
PROT UR QL STRIP: NEGATIVE
RBC # UR: ABNORMAL /HPF
REF LAB TEST METHOD: ABNORMAL
SP GR UR STRIP: 1.01 (ref 1–1.03)
SQUAMOUS #/AREA URNS HPF: ABNORMAL /HPF
UROBILINOGEN UR QL STRIP: ABNORMAL
WBC UR QL AUTO: ABNORMAL /HPF

## 2018-01-07 PROCEDURE — 81001 URINALYSIS AUTO W/SCOPE: CPT | Performed by: NURSE PRACTITIONER

## 2018-01-07 PROCEDURE — 87086 URINE CULTURE/COLONY COUNT: CPT | Performed by: NURSE PRACTITIONER

## 2018-01-07 PROCEDURE — 87186 SC STD MICRODIL/AGAR DIL: CPT | Performed by: NURSE PRACTITIONER

## 2018-01-07 PROCEDURE — 87088 URINE BACTERIA CULTURE: CPT | Performed by: NURSE PRACTITIONER

## 2018-01-09 LAB
BACTERIA SPEC AEROBE CULT: ABNORMAL
BACTERIA SPEC AEROBE CULT: ABNORMAL

## 2018-01-13 ENCOUNTER — LAB REQUISITION (OUTPATIENT)
Dept: LAB | Facility: HOSPITAL | Age: 83
End: 2018-01-13

## 2018-01-13 DIAGNOSIS — Z00.00 ENCOUNTER FOR GENERAL ADULT MEDICAL EXAMINATION WITHOUT ABNORMAL FINDINGS: ICD-10-CM

## 2018-01-13 LAB — C DIFF TOX GENS STL QL NAA+PROBE: NEGATIVE

## 2018-01-13 PROCEDURE — 87493 C DIFF AMPLIFIED PROBE: CPT | Performed by: NURSE PRACTITIONER

## 2018-01-31 ENCOUNTER — LAB REQUISITION (OUTPATIENT)
Dept: LAB | Facility: HOSPITAL | Age: 83
End: 2018-01-31

## 2018-01-31 DIAGNOSIS — Z00.00 ENCOUNTER FOR GENERAL ADULT MEDICAL EXAMINATION WITHOUT ABNORMAL FINDINGS: ICD-10-CM

## 2018-01-31 LAB — C DIFF TOX GENS STL QL NAA+PROBE: NEGATIVE

## 2018-01-31 PROCEDURE — 87493 C DIFF AMPLIFIED PROBE: CPT | Performed by: NURSE PRACTITIONER

## 2018-02-28 ENCOUNTER — LAB REQUISITION (OUTPATIENT)
Dept: LAB | Facility: HOSPITAL | Age: 83
End: 2018-02-28

## 2018-02-28 DIAGNOSIS — Z00.00 ENCOUNTER FOR GENERAL ADULT MEDICAL EXAMINATION WITHOUT ABNORMAL FINDINGS: ICD-10-CM

## 2018-02-28 LAB
BASOPHILS # BLD AUTO: 0.05 10*3/MM3 (ref 0–0.2)
BASOPHILS NFR BLD AUTO: 0.6 % (ref 0–2)
DEPRECATED RDW RBC AUTO: 41.1 FL (ref 40–54)
EOSINOPHIL # BLD AUTO: 0.4 10*3/MM3 (ref 0–0.7)
EOSINOPHIL NFR BLD AUTO: 4.6 % (ref 0–4)
ERYTHROCYTE [DISTWIDTH] IN BLOOD BY AUTOMATED COUNT: 12.7 % (ref 12–15)
HCT VFR BLD AUTO: 27.1 % (ref 37–47)
HGB BLD-MCNC: 9.4 G/DL (ref 12–16)
IMM GRANULOCYTES # BLD: 0.06 10*3/MM3 (ref 0–0.03)
IMM GRANULOCYTES NFR BLD: 0.7 % (ref 0–5)
LYMPHOCYTES # BLD AUTO: 1.39 10*3/MM3 (ref 0.72–4.86)
LYMPHOCYTES NFR BLD AUTO: 16.1 % (ref 15–45)
MCH RBC QN AUTO: 30.6 PG (ref 28–32)
MCHC RBC AUTO-ENTMCNC: 34.7 G/DL (ref 33–36)
MCV RBC AUTO: 88.3 FL (ref 82–98)
MONOCYTES # BLD AUTO: 0.79 10*3/MM3 (ref 0.19–1.3)
MONOCYTES NFR BLD AUTO: 9.1 % (ref 4–12)
NEUTROPHILS # BLD AUTO: 5.96 10*3/MM3 (ref 1.87–8.4)
NEUTROPHILS NFR BLD AUTO: 68.9 % (ref 39–78)
NRBC BLD MANUAL-RTO: 0 /100 WBC (ref 0–0)
PLATELET # BLD AUTO: 422 10*3/MM3 (ref 130–400)
PMV BLD AUTO: 9.6 FL (ref 6–12)
RBC # BLD AUTO: 3.07 10*6/MM3 (ref 4.2–5.4)
WBC NRBC COR # BLD: 8.65 10*3/MM3 (ref 4.8–10.8)

## 2018-02-28 PROCEDURE — 85025 COMPLETE CBC W/AUTO DIFF WBC: CPT | Performed by: NURSE PRACTITIONER

## 2018-02-28 PROCEDURE — 36415 COLL VENOUS BLD VENIPUNCTURE: CPT | Performed by: NURSE PRACTITIONER

## 2018-03-07 ENCOUNTER — LAB REQUISITION (OUTPATIENT)
Dept: LAB | Facility: HOSPITAL | Age: 83
End: 2018-03-07

## 2018-03-07 DIAGNOSIS — Z00.00 ENCOUNTER FOR GENERAL ADULT MEDICAL EXAMINATION WITHOUT ABNORMAL FINDINGS: ICD-10-CM

## 2018-03-07 LAB
ALBUMIN SERPL-MCNC: 2.9 G/DL (ref 3.5–5)
ALBUMIN/GLOB SERPL: 0.9 G/DL (ref 1.1–2.5)
ALP SERPL-CCNC: 73 U/L (ref 24–120)
ALT SERPL W P-5'-P-CCNC: 25 U/L (ref 0–54)
ANION GAP SERPL CALCULATED.3IONS-SCNC: 12 MMOL/L (ref 4–13)
AST SERPL-CCNC: 16 U/L (ref 7–45)
BASOPHILS # BLD AUTO: 0.05 10*3/MM3 (ref 0–0.2)
BASOPHILS NFR BLD AUTO: 0.6 % (ref 0–2)
BILIRUB SERPL-MCNC: 0.1 MG/DL (ref 0.1–1)
BUN BLD-MCNC: 16 MG/DL (ref 5–21)
BUN/CREAT SERPL: 19.8 (ref 7–25)
CALCIUM SPEC-SCNC: 9 MG/DL (ref 8.4–10.4)
CHLORIDE SERPL-SCNC: 99 MMOL/L (ref 98–110)
CO2 SERPL-SCNC: 19 MMOL/L (ref 24–31)
CREAT BLD-MCNC: 0.81 MG/DL (ref 0.5–1.4)
DEPRECATED RDW RBC AUTO: 42.1 FL (ref 40–54)
EOSINOPHIL # BLD AUTO: 0.53 10*3/MM3 (ref 0–0.7)
EOSINOPHIL NFR BLD AUTO: 6.5 % (ref 0–4)
ERYTHROCYTE [DISTWIDTH] IN BLOOD BY AUTOMATED COUNT: 13.1 % (ref 12–15)
GFR SERPL CREATININE-BSD FRML MDRD: 67 ML/MIN/1.73
GLOBULIN UR ELPH-MCNC: 3.4 GM/DL
GLUCOSE BLD-MCNC: 68 MG/DL (ref 70–100)
HCT VFR BLD AUTO: 28.3 % (ref 37–47)
HGB BLD-MCNC: 9.6 G/DL (ref 12–16)
IMM GRANULOCYTES # BLD: 0.09 10*3/MM3 (ref 0–0.03)
IMM GRANULOCYTES NFR BLD: 1.1 % (ref 0–5)
LYMPHOCYTES # BLD AUTO: 1.04 10*3/MM3 (ref 0.72–4.86)
LYMPHOCYTES NFR BLD AUTO: 12.7 % (ref 15–45)
MCH RBC QN AUTO: 29.7 PG (ref 28–32)
MCHC RBC AUTO-ENTMCNC: 33.9 G/DL (ref 33–36)
MCV RBC AUTO: 87.6 FL (ref 82–98)
MONOCYTES # BLD AUTO: 0.75 10*3/MM3 (ref 0.19–1.3)
MONOCYTES NFR BLD AUTO: 9.1 % (ref 4–12)
NEUTROPHILS # BLD AUTO: 5.74 10*3/MM3 (ref 1.87–8.4)
NEUTROPHILS NFR BLD AUTO: 70 % (ref 39–78)
NRBC BLD MANUAL-RTO: 0 /100 WBC (ref 0–0)
PLATELET # BLD AUTO: 403 10*3/MM3 (ref 130–400)
PMV BLD AUTO: 9.8 FL (ref 6–12)
POTASSIUM BLD-SCNC: 5.1 MMOL/L (ref 3.5–5.3)
PROT SERPL-MCNC: 6.3 G/DL (ref 6.3–8.7)
RBC # BLD AUTO: 3.23 10*6/MM3 (ref 4.2–5.4)
SODIUM BLD-SCNC: 130 MMOL/L (ref 135–145)
WBC NRBC COR # BLD: 8.2 10*3/MM3 (ref 4.8–10.8)

## 2018-03-07 PROCEDURE — 85025 COMPLETE CBC W/AUTO DIFF WBC: CPT | Performed by: NURSE PRACTITIONER

## 2018-03-07 PROCEDURE — 36415 COLL VENOUS BLD VENIPUNCTURE: CPT | Performed by: NURSE PRACTITIONER

## 2018-03-07 PROCEDURE — 80053 COMPREHEN METABOLIC PANEL: CPT | Performed by: NURSE PRACTITIONER

## 2018-04-19 ENCOUNTER — LAB REQUISITION (OUTPATIENT)
Dept: LAB | Facility: HOSPITAL | Age: 83
End: 2018-04-19

## 2018-04-19 DIAGNOSIS — Z00.00 ENCOUNTER FOR GENERAL ADULT MEDICAL EXAMINATION WITHOUT ABNORMAL FINDINGS: ICD-10-CM

## 2018-04-19 LAB
BASOPHILS # BLD AUTO: 0.04 10*3/MM3 (ref 0–0.2)
BASOPHILS NFR BLD AUTO: 0.7 % (ref 0–2)
DEPRECATED RDW RBC AUTO: 47.1 FL (ref 40–54)
EOSINOPHIL # BLD AUTO: 0.09 10*3/MM3 (ref 0–0.7)
EOSINOPHIL NFR BLD AUTO: 1.5 % (ref 0–4)
ERYTHROCYTE [DISTWIDTH] IN BLOOD BY AUTOMATED COUNT: 14.4 % (ref 12–15)
HCT VFR BLD AUTO: 29.2 % (ref 37–47)
HGB BLD-MCNC: 9.8 G/DL (ref 12–16)
IMM GRANULOCYTES # BLD: 0.02 10*3/MM3 (ref 0–0.03)
IMM GRANULOCYTES NFR BLD: 0.3 % (ref 0–5)
LYMPHOCYTES # BLD AUTO: 1.22 10*3/MM3 (ref 0.72–4.86)
LYMPHOCYTES NFR BLD AUTO: 20.8 % (ref 15–45)
MCH RBC QN AUTO: 30.1 PG (ref 28–32)
MCHC RBC AUTO-ENTMCNC: 33.6 G/DL (ref 33–36)
MCV RBC AUTO: 89.6 FL (ref 82–98)
MONOCYTES # BLD AUTO: 0.69 10*3/MM3 (ref 0.19–1.3)
MONOCYTES NFR BLD AUTO: 11.8 % (ref 4–12)
NEUTROPHILS # BLD AUTO: 3.81 10*3/MM3 (ref 1.87–8.4)
NEUTROPHILS NFR BLD AUTO: 64.9 % (ref 39–78)
NRBC BLD MANUAL-RTO: 0 /100 WBC (ref 0–0)
PLATELET # BLD AUTO: 281 10*3/MM3 (ref 130–400)
PMV BLD AUTO: 10.6 FL (ref 6–12)
RBC # BLD AUTO: 3.26 10*6/MM3 (ref 4.2–5.4)
WBC NRBC COR # BLD: 5.87 10*3/MM3 (ref 4.8–10.8)

## 2018-04-19 PROCEDURE — 85025 COMPLETE CBC W/AUTO DIFF WBC: CPT | Performed by: INTERNAL MEDICINE

## 2018-04-19 PROCEDURE — 36415 COLL VENOUS BLD VENIPUNCTURE: CPT | Performed by: INTERNAL MEDICINE

## 2018-09-06 ENCOUNTER — LAB REQUISITION (OUTPATIENT)
Dept: LAB | Facility: HOSPITAL | Age: 83
End: 2018-09-06

## 2018-09-06 DIAGNOSIS — Z00.00 ENCOUNTER FOR GENERAL ADULT MEDICAL EXAMINATION WITHOUT ABNORMAL FINDINGS: ICD-10-CM

## 2018-09-06 LAB
25(OH)D3 SERPL-MCNC: 13.9 NG/ML (ref 30–100)
ALBUMIN SERPL-MCNC: 2.8 G/DL (ref 3.5–5)
ALBUMIN/GLOB SERPL: 0.8 G/DL (ref 1.1–2.5)
ALP SERPL-CCNC: 89 U/L (ref 24–120)
ALT SERPL W P-5'-P-CCNC: 16 U/L (ref 0–54)
ANION GAP SERPL CALCULATED.3IONS-SCNC: 11 MMOL/L (ref 4–13)
ARTICHOKE IGE QN: 111 MG/DL (ref 0–99)
AST SERPL-CCNC: 15 U/L (ref 7–45)
BASOPHILS # BLD AUTO: 0.07 10*3/MM3 (ref 0–0.2)
BASOPHILS NFR BLD AUTO: 1.2 % (ref 0–2)
BILIRUB SERPL-MCNC: 0.3 MG/DL (ref 0.1–1)
BUN BLD-MCNC: 14 MG/DL (ref 5–21)
BUN/CREAT SERPL: 20.6 (ref 7–25)
CALCIUM SPEC-SCNC: 8.6 MG/DL (ref 8.4–10.4)
CHLORIDE SERPL-SCNC: 100 MMOL/L (ref 98–110)
CHOLEST SERPL-MCNC: 149 MG/DL (ref 130–200)
CO2 SERPL-SCNC: 21 MMOL/L (ref 24–31)
CREAT BLD-MCNC: 0.68 MG/DL (ref 0.5–1.4)
DEPRECATED RDW RBC AUTO: 44.4 FL (ref 40–54)
EOSINOPHIL # BLD AUTO: 0 10*3/MM3 (ref 0–0.7)
EOSINOPHIL NFR BLD AUTO: 0 % (ref 0–4)
ERYTHROCYTE [DISTWIDTH] IN BLOOD BY AUTOMATED COUNT: 13.5 % (ref 12–15)
GFR SERPL CREATININE-BSD FRML MDRD: 82 ML/MIN/1.73
GLOBULIN UR ELPH-MCNC: 3.3 GM/DL
GLUCOSE BLD-MCNC: 67 MG/DL (ref 70–100)
HCT VFR BLD AUTO: 29.6 % (ref 37–47)
HDLC SERPL-MCNC: 41 MG/DL
HGB BLD-MCNC: 9.7 G/DL (ref 12–16)
IMM GRANULOCYTES # BLD: 0.04 10*3/MM3 (ref 0–0.03)
IMM GRANULOCYTES NFR BLD: 0.7 % (ref 0–5)
LDLC/HDLC SERPL: 2.28 {RATIO}
LYMPHOCYTES # BLD AUTO: 1.2 10*3/MM3 (ref 0.72–4.86)
LYMPHOCYTES NFR BLD AUTO: 20.2 % (ref 15–45)
MCH RBC QN AUTO: 29.2 PG (ref 28–32)
MCHC RBC AUTO-ENTMCNC: 32.8 G/DL (ref 33–36)
MCV RBC AUTO: 89.2 FL (ref 82–98)
MONOCYTES # BLD AUTO: 0.69 10*3/MM3 (ref 0.19–1.3)
MONOCYTES NFR BLD AUTO: 11.6 % (ref 4–12)
NEUTROPHILS # BLD AUTO: 3.93 10*3/MM3 (ref 1.87–8.4)
NEUTROPHILS NFR BLD AUTO: 66.3 % (ref 39–78)
NRBC BLD MANUAL-RTO: 0 /100 WBC (ref 0–0)
PLATELET # BLD AUTO: 371 10*3/MM3 (ref 130–400)
PMV BLD AUTO: 9.7 FL (ref 6–12)
POTASSIUM BLD-SCNC: 4.7 MMOL/L (ref 3.5–5.3)
PROT SERPL-MCNC: 6.1 G/DL (ref 6.3–8.7)
RBC # BLD AUTO: 3.32 10*6/MM3 (ref 4.2–5.4)
SODIUM BLD-SCNC: 132 MMOL/L (ref 135–145)
TRIGL SERPL-MCNC: 72 MG/DL (ref 0–149)
WBC NRBC COR # BLD: 5.93 10*3/MM3 (ref 4.8–10.8)

## 2018-09-06 PROCEDURE — 85025 COMPLETE CBC W/AUTO DIFF WBC: CPT | Performed by: NURSE PRACTITIONER

## 2018-09-06 PROCEDURE — 82306 VITAMIN D 25 HYDROXY: CPT | Performed by: NURSE PRACTITIONER

## 2018-09-06 PROCEDURE — 36415 COLL VENOUS BLD VENIPUNCTURE: CPT | Performed by: NURSE PRACTITIONER

## 2018-09-06 PROCEDURE — 80053 COMPREHEN METABOLIC PANEL: CPT | Performed by: NURSE PRACTITIONER

## 2018-09-06 PROCEDURE — 80061 LIPID PANEL: CPT | Performed by: NURSE PRACTITIONER

## 2018-10-18 ENCOUNTER — LAB REQUISITION (OUTPATIENT)
Dept: LAB | Facility: HOSPITAL | Age: 83
End: 2018-10-18

## 2018-10-18 DIAGNOSIS — Z00.00 ENCOUNTER FOR GENERAL ADULT MEDICAL EXAMINATION WITHOUT ABNORMAL FINDINGS: ICD-10-CM

## 2018-10-18 LAB — 25(OH)D3 SERPL-MCNC: 63.3 NG/ML (ref 30–100)

## 2018-10-18 PROCEDURE — 36415 COLL VENOUS BLD VENIPUNCTURE: CPT | Performed by: NURSE PRACTITIONER

## 2018-10-18 PROCEDURE — 82306 VITAMIN D 25 HYDROXY: CPT | Performed by: NURSE PRACTITIONER

## 2019-03-06 ENCOUNTER — LAB REQUISITION (OUTPATIENT)
Dept: LAB | Facility: HOSPITAL | Age: 84
End: 2019-03-06

## 2019-03-06 DIAGNOSIS — Z00.00 ENCOUNTER FOR GENERAL ADULT MEDICAL EXAMINATION WITHOUT ABNORMAL FINDINGS: ICD-10-CM

## 2019-03-06 LAB
ALBUMIN SERPL-MCNC: 2.9 G/DL (ref 3.5–5)
ALBUMIN/GLOB SERPL: 0.9 G/DL (ref 1.1–2.5)
ALP SERPL-CCNC: 75 U/L (ref 24–120)
ALT SERPL W P-5'-P-CCNC: 20 U/L (ref 0–54)
ANION GAP SERPL CALCULATED.3IONS-SCNC: 9 MMOL/L (ref 4–13)
AST SERPL-CCNC: 21 U/L (ref 7–45)
BASOPHILS # BLD AUTO: 0.03 10*3/MM3 (ref 0–0.2)
BASOPHILS NFR BLD AUTO: 0.5 % (ref 0–2)
BILIRUB SERPL-MCNC: 0.3 MG/DL (ref 0.1–1)
BUN BLD-MCNC: 22 MG/DL (ref 5–21)
BUN/CREAT SERPL: 28.9 (ref 7–25)
CALCIUM SPEC-SCNC: 8.6 MG/DL (ref 8.4–10.4)
CHLORIDE SERPL-SCNC: 103 MMOL/L (ref 98–110)
CO2 SERPL-SCNC: 21 MMOL/L (ref 24–31)
CREAT BLD-MCNC: 0.76 MG/DL (ref 0.5–1.4)
DEPRECATED RDW RBC AUTO: 45.5 FL (ref 40–54)
EOSINOPHIL # BLD AUTO: 0 10*3/MM3 (ref 0–0.7)
EOSINOPHIL NFR BLD AUTO: 0 % (ref 0–4)
ERYTHROCYTE [DISTWIDTH] IN BLOOD BY AUTOMATED COUNT: 14.2 % (ref 12–15)
GFR SERPL CREATININE-BSD FRML MDRD: 72 ML/MIN/1.73
GLOBULIN UR ELPH-MCNC: 3.2 GM/DL
GLUCOSE BLD-MCNC: 73 MG/DL (ref 70–100)
HCT VFR BLD AUTO: 30.1 % (ref 37–47)
HGB BLD-MCNC: 10.1 G/DL (ref 12–16)
IMM GRANULOCYTES # BLD AUTO: 0.03 10*3/MM3 (ref 0–0.05)
IMM GRANULOCYTES NFR BLD AUTO: 0.5 % (ref 0–5)
LYMPHOCYTES # BLD AUTO: 1.33 10*3/MM3 (ref 0.72–4.86)
LYMPHOCYTES NFR BLD AUTO: 21.4 % (ref 15–45)
MCH RBC QN AUTO: 29.3 PG (ref 28–32)
MCHC RBC AUTO-ENTMCNC: 33.6 G/DL (ref 33–36)
MCV RBC AUTO: 87.2 FL (ref 82–98)
MONOCYTES # BLD AUTO: 0.5 10*3/MM3 (ref 0.19–1.3)
MONOCYTES NFR BLD AUTO: 8 % (ref 4–12)
NEUTROPHILS # BLD AUTO: 4.33 10*3/MM3 (ref 1.87–8.4)
NEUTROPHILS NFR BLD AUTO: 69.6 % (ref 39–78)
NRBC BLD AUTO-RTO: 0 /100 WBC (ref 0–0)
PLATELET # BLD AUTO: 303 10*3/MM3 (ref 130–400)
PMV BLD AUTO: 10.2 FL (ref 6–12)
POTASSIUM BLD-SCNC: 4.7 MMOL/L (ref 3.5–5.3)
PROT SERPL-MCNC: 6.1 G/DL (ref 6.3–8.7)
RBC # BLD AUTO: 3.45 10*6/MM3 (ref 4.2–5.4)
SODIUM BLD-SCNC: 133 MMOL/L (ref 135–145)
WBC NRBC COR # BLD: 6.22 10*3/MM3 (ref 4.8–10.8)

## 2019-03-06 PROCEDURE — 80053 COMPREHEN METABOLIC PANEL: CPT | Performed by: NURSE PRACTITIONER

## 2019-03-06 PROCEDURE — 85025 COMPLETE CBC W/AUTO DIFF WBC: CPT | Performed by: NURSE PRACTITIONER

## 2019-03-06 PROCEDURE — 36415 COLL VENOUS BLD VENIPUNCTURE: CPT | Performed by: NURSE PRACTITIONER

## 2019-04-30 NOTE — PLAN OF CARE
[facility]  [address] [place] [id number]  Phone:  [phone]                                    Karey Paulson   MRN: [id number]    Department:  [facility]   Date of Visit:  4/30/2019           After Visit Summary Signature Page    I have received my discharge instructions, and my questions have been answered. I have discussed any challenges I see with this plan with the nurse or doctor.    ..........................................................................................................................................  Patient/Patient Representative Signature      ..........................................................................................................................................  Patient Representative Print Name and Relationship to Patient    ..................................................               ................................................  Date                                   Time    ..........................................................................................................................................  Reviewed by Signature/Title    ...................................................              ..............................................  Date                                               Time          22EPIC Rev 08/18        Problem: Patient Care Overview (Adult)  Goal: Plan of Care Review  Outcome: Ongoing (interventions implemented as appropriate)    02/17/17 1115   Coping/Psychosocial Response Interventions   Plan Of Care Reviewed With patient   Patient Care Overview   Progress progress towards functional goals is fair   Outcome Evaluation   Outcome Summary/Follow up Plan Pt is fatigued and having difficulty with transitioning directions for functional mobility into action. She is pushing against assistance. She was able to attempt to stand, but unable to fully stand even with an elevated bed surface. The patient will benefit from further PT services after discharge.

## (undated) DEVICE — GOWN,PREVENTION PLUS,XLONG/XLARGE,STRL: Brand: MEDLINE

## (undated) DEVICE — BNDG ELAS CO-FLEX SLF ADHR 6IN 5YD LF STRL

## (undated) DEVICE — Device

## (undated) DEVICE — PROXIMATE RH ROTATING HEAD SKIN STAPLERS (35 REGULAR) CONTAINS 35 STAINLESS STEEL STAPLES: Brand: PROXIMATE

## (undated) DEVICE — 4-PORT MANIFOLD: Brand: NEPTUNE 2

## (undated) DEVICE — SUT GUT CHRM 2/0 CT1 36IN 923H

## (undated) DEVICE — PK TURNOVER RM ADV

## (undated) DEVICE — BIT DRL 3FLUT QC CALIB 4.2X330X100MM

## (undated) DEVICE — DRSNG WND GZ CURAD OIL EMULSION 3X8IN STRL PK/3

## (undated) DEVICE — PK HIP ORIF FX TABL 30

## (undated) DEVICE — ANTIBACTERIAL UNDYED BRAIDED (POLYGLACTIN 910), SYNTHETIC ABSORBABLE SUTURE: Brand: COATED VICRYL

## (undated) DEVICE — GW 3.2X475MM

## (undated) DEVICE — GW FOR TROCH NAIL 3.2X400MM

## (undated) DEVICE — TRY PREP SCRB VAG PVP

## (undated) DEVICE — GLV SURG TRIUMPH MICRO PF LTX 7.5 STRL

## (undated) DEVICE — WIPE THERAWASH SLV SPEC CARE 2PK

## (undated) DEVICE — GLV SURG TRIUMPH ORTHO W/ALOE PF LTX 7.5 STRL

## (undated) DEVICE — UNDERGLV SURG BIOGEL INDICAT PF 71/2 GRN

## (undated) DEVICE — DRSNG WND SIL OPTIFOAM GENTLE BRDR ADHS W/SA 4X4IN

## (undated) DEVICE — SPNG GZ WOVN 4X4IN 12PLY 10/BX STRL

## (undated) DEVICE — GLV SURG TRIUMPH GREEN W/ALOE PF LTX 8 STRL

## (undated) DEVICE — GLV SURG TRIUMPH MICRO PF LTX 8 STRL